# Patient Record
Sex: FEMALE | Race: WHITE | NOT HISPANIC OR LATINO | Employment: UNEMPLOYED | URBAN - METROPOLITAN AREA
[De-identification: names, ages, dates, MRNs, and addresses within clinical notes are randomized per-mention and may not be internally consistent; named-entity substitution may affect disease eponyms.]

---

## 2023-08-28 LAB
ALANINE AMINOTRANSFERASE (SGPT) (U/L) IN SER/PLAS: 12 U/L (ref 7–45)
ALBUMIN (G/DL) IN SER/PLAS: 4 G/DL (ref 3.4–5)
ALKALINE PHOSPHATASE (U/L) IN SER/PLAS: 62 U/L (ref 33–136)
ANION GAP IN SER/PLAS: 11 MMOL/L (ref 10–20)
ASPARTATE AMINOTRANSFERASE (SGOT) (U/L) IN SER/PLAS: 19 U/L (ref 9–39)
BILIRUBIN TOTAL (MG/DL) IN SER/PLAS: 0.6 MG/DL (ref 0–1.2)
CALCIUM (MG/DL) IN SER/PLAS: 9.2 MG/DL (ref 8.6–10.3)
CARBON DIOXIDE, TOTAL (MMOL/L) IN SER/PLAS: 28 MMOL/L (ref 21–32)
CHLORIDE (MMOL/L) IN SER/PLAS: 102 MMOL/L (ref 98–107)
CHOLESTEROL (MG/DL) IN SER/PLAS: 191 MG/DL (ref 0–199)
CHOLESTEROL IN HDL (MG/DL) IN SER/PLAS: 64.3 MG/DL
CHOLESTEROL/HDL RATIO: 3
CREATININE (MG/DL) IN SER/PLAS: 0.62 MG/DL (ref 0.5–1.05)
ERYTHROCYTE DISTRIBUTION WIDTH (RATIO) BY AUTOMATED COUNT: 13.4 % (ref 11.5–14.5)
ERYTHROCYTE MEAN CORPUSCULAR HEMOGLOBIN CONCENTRATION (G/DL) BY AUTOMATED: 33.3 G/DL (ref 32–36)
ERYTHROCYTE MEAN CORPUSCULAR VOLUME (FL) BY AUTOMATED COUNT: 97 FL (ref 80–100)
ERYTHROCYTES (10*6/UL) IN BLOOD BY AUTOMATED COUNT: 4.37 X10E12/L (ref 4–5.2)
GFR FEMALE: >90 ML/MIN/1.73M2
GLUCOSE (MG/DL) IN SER/PLAS: 94 MG/DL (ref 74–99)
HEMATOCRIT (%) IN BLOOD BY AUTOMATED COUNT: 42.6 % (ref 36–46)
HEMOGLOBIN (G/DL) IN BLOOD: 14.2 G/DL (ref 12–16)
LDL: 113 MG/DL (ref 0–99)
LEUKOCYTES (10*3/UL) IN BLOOD BY AUTOMATED COUNT: 5.8 X10E9/L (ref 4.4–11.3)
PLATELETS (10*3/UL) IN BLOOD AUTOMATED COUNT: 241 X10E9/L (ref 150–450)
POTASSIUM (MMOL/L) IN SER/PLAS: 4.1 MMOL/L (ref 3.5–5.3)
PROTEIN TOTAL: 7.2 G/DL (ref 6.4–8.2)
SODIUM (MMOL/L) IN SER/PLAS: 137 MMOL/L (ref 136–145)
TRIGLYCERIDE (MG/DL) IN SER/PLAS: 71 MG/DL (ref 0–149)
UREA NITROGEN (MG/DL) IN SER/PLAS: 9 MG/DL (ref 6–23)
VLDL: 14 MG/DL (ref 0–40)

## 2023-08-29 ENCOUNTER — OFFICE VISIT (OUTPATIENT)
Dept: PRIMARY CARE | Facility: CLINIC | Age: 63
End: 2023-08-29
Payer: COMMERCIAL

## 2023-08-29 VITALS
DIASTOLIC BLOOD PRESSURE: 80 MMHG | OXYGEN SATURATION: 98 % | SYSTOLIC BLOOD PRESSURE: 128 MMHG | HEART RATE: 90 BPM | HEIGHT: 67 IN | WEIGHT: 152.6 LBS | BODY MASS INDEX: 23.95 KG/M2 | RESPIRATION RATE: 16 BRPM | TEMPERATURE: 97.8 F

## 2023-08-29 DIAGNOSIS — Z12.4 SCREENING FOR CERVICAL CANCER: ICD-10-CM

## 2023-08-29 DIAGNOSIS — G43.009 MIGRAINE WITHOUT AURA AND WITHOUT STATUS MIGRAINOSUS, NOT INTRACTABLE: ICD-10-CM

## 2023-08-29 DIAGNOSIS — E78.00 HIGH CHOLESTEROL: Primary | ICD-10-CM

## 2023-08-29 DIAGNOSIS — Z12.31 SCREENING MAMMOGRAM FOR BREAST CANCER: ICD-10-CM

## 2023-08-29 DIAGNOSIS — M25.50 ARTHRALGIA, UNSPECIFIED JOINT: ICD-10-CM

## 2023-08-29 DIAGNOSIS — Z12.11 SCREENING FOR COLON CANCER: ICD-10-CM

## 2023-08-29 DIAGNOSIS — Z00.00 WELL ADULT EXAM: ICD-10-CM

## 2023-08-29 PROBLEM — R53.83 FATIGUE: Status: RESOLVED | Noted: 2023-08-29 | Resolved: 2023-08-29

## 2023-08-29 PROBLEM — R21 RASH AND OTHER NONSPECIFIC SKIN ERUPTION: Status: RESOLVED | Noted: 2022-08-31 | Resolved: 2023-08-29

## 2023-08-29 PROBLEM — M47.816 LUMBAR SPONDYLOSIS: Status: ACTIVE | Noted: 2023-08-29

## 2023-08-29 PROBLEM — L82.1 OTHER SEBORRHEIC KERATOSIS: Status: RESOLVED | Noted: 2022-08-31 | Resolved: 2023-08-29

## 2023-08-29 PROBLEM — D18.01 HEMANGIOMA OF SKIN AND SUBCUTANEOUS TISSUE: Status: RESOLVED | Noted: 2022-08-31 | Resolved: 2023-08-29

## 2023-08-29 PROBLEM — K64.4 HEMORRHOIDAL SKIN TAG: Status: RESOLVED | Noted: 2023-08-29 | Resolved: 2023-08-29

## 2023-08-29 PROBLEM — F41.9 ANXIETY AND DEPRESSION: Status: RESOLVED | Noted: 2023-08-29 | Resolved: 2023-08-29

## 2023-08-29 PROBLEM — D48.5 NEOPLASM OF UNCERTAIN BEHAVIOR OF SKIN: Status: RESOLVED | Noted: 2022-08-31 | Resolved: 2023-08-29

## 2023-08-29 PROBLEM — B07.8 OTHER VIRAL WARTS: Status: RESOLVED | Noted: 2022-08-31 | Resolved: 2023-08-29

## 2023-08-29 PROBLEM — M75.82 TENDINITIS OF LEFT ROTATOR CUFF: Status: RESOLVED | Noted: 2023-08-29 | Resolved: 2023-08-29

## 2023-08-29 PROBLEM — L85.3 XEROSIS CUTIS: Status: RESOLVED | Noted: 2022-08-31 | Resolved: 2023-08-29

## 2023-08-29 PROBLEM — M75.82 TENDINITIS OF LEFT ROTATOR CUFF: Status: ACTIVE | Noted: 2023-08-29

## 2023-08-29 PROBLEM — F32.A ANXIETY AND DEPRESSION: Status: RESOLVED | Noted: 2023-08-29 | Resolved: 2023-08-29

## 2023-08-29 PROBLEM — D22.5 MELANOCYTIC NEVI OF TRUNK: Status: RESOLVED | Noted: 2022-08-31 | Resolved: 2023-08-29

## 2023-08-29 PROBLEM — F32.A ANXIETY AND DEPRESSION: Status: ACTIVE | Noted: 2023-08-29

## 2023-08-29 PROBLEM — L74.513 PRIMARY FOCAL HYPERHIDROSIS, SOLES: Status: RESOLVED | Noted: 2022-08-31 | Resolved: 2023-08-29

## 2023-08-29 PROBLEM — D22.60 MELANOCYTIC NEVI OF UNSPECIFIED UPPER LIMB, INCLUDING SHOULDER: Status: RESOLVED | Noted: 2022-08-31 | Resolved: 2023-08-29

## 2023-08-29 PROBLEM — L21.9 SEBORRHEIC DERMATITIS: Status: RESOLVED | Noted: 2023-08-29 | Resolved: 2023-08-29

## 2023-08-29 PROBLEM — K59.04 CHRONIC IDIOPATHIC CONSTIPATION: Status: ACTIVE | Noted: 2023-08-29

## 2023-08-29 PROBLEM — F41.9 ANXIETY AND DEPRESSION: Status: ACTIVE | Noted: 2023-08-29

## 2023-08-29 PROBLEM — L85.3 DRY SKIN DERMATITIS: Status: RESOLVED | Noted: 2023-08-29 | Resolved: 2023-08-29

## 2023-08-29 PROBLEM — L81.4 OTHER MELANIN HYPERPIGMENTATION: Status: RESOLVED | Noted: 2022-08-31 | Resolved: 2023-08-29

## 2023-08-29 PROBLEM — D22.70 MELANOCYTIC NEVI OF UNSPECIFIED LOWER LIMB, INCLUDING HIP: Status: RESOLVED | Noted: 2022-08-31 | Resolved: 2023-08-29

## 2023-08-29 PROBLEM — R23.3 PETECHIAE: Status: RESOLVED | Noted: 2023-08-29 | Resolved: 2023-08-29

## 2023-08-29 PROBLEM — G47.00 INSOMNIA: Status: ACTIVE | Noted: 2023-08-29

## 2023-08-29 PROBLEM — R92.8 ABNORMAL FINDING ON MAMMOGRAPHY: Status: RESOLVED | Noted: 2023-08-29 | Resolved: 2023-08-29

## 2023-08-29 PROBLEM — L90.5 SCAR CONDITION AND FIBROSIS OF SKIN: Status: RESOLVED | Noted: 2022-08-31 | Resolved: 2023-08-29

## 2023-08-29 PROBLEM — D22.30 MELANOCYTIC NEVI OF UNSPECIFIED PART OF FACE: Status: RESOLVED | Noted: 2022-08-31 | Resolved: 2023-08-29

## 2023-08-29 PROBLEM — D49.2 SKIN NEOPLASM: Status: RESOLVED | Noted: 2023-08-29 | Resolved: 2023-08-29

## 2023-08-29 PROCEDURE — 87624 HPV HI-RISK TYP POOLED RSLT: CPT

## 2023-08-29 PROCEDURE — 88175 CYTOPATH C/V AUTO FLUID REDO: CPT

## 2023-08-29 PROCEDURE — 99396 PREV VISIT EST AGE 40-64: CPT | Performed by: FAMILY MEDICINE

## 2023-08-29 RX ORDER — EZETIMIBE 10 MG/1
10 TABLET ORAL DAILY
COMMUNITY
End: 2024-02-20 | Stop reason: SDUPTHER

## 2023-08-29 RX ORDER — VERAPAMIL HYDROCHLORIDE 180 MG/1
180 CAPSULE, EXTENDED RELEASE ORAL NIGHTLY
Qty: 30 CAPSULE | Refills: 11 | Status: SHIPPED | OUTPATIENT
Start: 2023-08-29 | End: 2023-11-10 | Stop reason: ALTCHOICE

## 2023-08-29 RX ORDER — DICLOFENAC SODIUM 75 MG/1
75 TABLET, DELAYED RELEASE ORAL 2 TIMES DAILY PRN
Qty: 60 TABLET | Refills: 11 | Status: SHIPPED | OUTPATIENT
Start: 2023-08-29 | End: 2024-08-28

## 2023-08-29 RX ORDER — TRAZODONE HYDROCHLORIDE 100 MG/1
200 TABLET ORAL DAILY
COMMUNITY
End: 2024-02-20 | Stop reason: ALTCHOICE

## 2023-08-29 RX ORDER — SUMATRIPTAN SUCCINATE 100 MG/1
50-100 TABLET ORAL ONCE AS NEEDED
Qty: 9 TABLET | Refills: 11 | Status: SHIPPED | OUTPATIENT
Start: 2023-08-29 | End: 2023-09-20

## 2023-08-29 ASSESSMENT — ENCOUNTER SYMPTOMS
CHILLS: 0
LOSS OF SENSATION IN FEET: 0
BLOOD IN STOOL: 0
FEVER: 0
BRUISES/BLEEDS EASILY: 0
ADENOPATHY: 0
ABDOMINAL PAIN: 0
OCCASIONAL FEELINGS OF UNSTEADINESS: 0
DEPRESSION: 1
FATIGUE: 0
ARTHRALGIAS: 1
EYE REDNESS: 0
WOUND: 0
HALLUCINATIONS: 0
RHINORRHEA: 0
BACK PAIN: 0
PALPITATIONS: 0
DYSURIA: 0
SHORTNESS OF BREATH: 0
HEMATURIA: 0
SORE THROAT: 0
EYE PAIN: 0
POLYDIPSIA: 0
HEADACHES: 1
COUGH: 0
WEAKNESS: 0
NECK STIFFNESS: 0

## 2023-08-29 ASSESSMENT — PATIENT HEALTH QUESTIONNAIRE - PHQ9
2. FEELING DOWN, DEPRESSED OR HOPELESS: NOT AT ALL
1. LITTLE INTEREST OR PLEASURE IN DOING THINGS: NOT AT ALL
1. LITTLE INTEREST OR PLEASURE IN DOING THINGS: NOT AT ALL
SUM OF ALL RESPONSES TO PHQ9 QUESTIONS 1 AND 2: 0
SUM OF ALL RESPONSES TO PHQ9 QUESTIONS 1 AND 2: 0
2. FEELING DOWN, DEPRESSED OR HOPELESS: NOT AT ALL

## 2023-08-29 NOTE — ASSESSMENT & PLAN NOTE
Increased frequency.   Encouraged regular sleep, small frequent meals to avoid low sugar.     Start imitrex 100 mg 1/2 -1 to help stop headaches before they get going strong.     Start verapamil 180 mg ER at bedtime.

## 2023-08-29 NOTE — PROGRESS NOTES
Maribel Lakhani is a 63 y.o. female with Chief Complaint of No chief complaint on file..    Past Surgical History:   Procedure Laterality Date    DILATION AND CURETTAGE OF UTERUS  05/05/2014    Dilation And Curettage    FOOT SURGERY  03/11/2014    Foot Surgery    OTHER SURGICAL HISTORY  03/11/2014    Oral Surgery      Social History     Socioeconomic History    Marital status:      Spouse name: Not on file    Number of children: Not on file    Years of education: Not on file    Highest education level: Not on file   Occupational History    Not on file   Tobacco Use    Smoking status: Every Day     Packs/day: .5     Types: Cigarettes    Smokeless tobacco: Never   Substance and Sexual Activity    Alcohol use: Yes     Alcohol/week: 2.0 standard drinks of alcohol     Types: 2 Glasses of wine per week     Comment: not triggering migraines    Drug use: Not on file    Sexual activity: Not on file   Other Topics Concern    Not on file   Social History Narrative    Not on file     Social Determinants of Health     Financial Resource Strain: Not on file   Food Insecurity: Not on file   Transportation Needs: Not on file   Physical Activity: Not on file   Stress: Not on file   Social Connections: Not on file   Intimate Partner Violence: Not on file   Housing Stability: Not on file     Past Medical History:   Diagnosis Date    Abnormal finding on mammography 08/29/2023    Adhesive capsulitis of left shoulder 01/25/2019    Adhesive capsulitis of left shoulder    Adhesive capsulitis of right shoulder 12/03/2015    Adhesive capsulitis of right shoulder    Anxiety and depression 08/29/2023    # Hx Decreased moods c/w major depression and increased anxiety -- settled down.  stable off all SSRIs.    Bitten by cat, initial encounter 05/14/2015    Cat bite    Dry skin dermatitis 08/29/2023    Fatigue 08/29/2023    Hemangioma of skin and subcutaneous tissue 08/31/2022    Hemorrhoidal skin tag 08/29/2023    Melanocytic nevi of  trunk 2022    Melanocytic nevi of unspecified lower limb, including hip 2022    Melanocytic nevi of unspecified part of face 2022    Melanocytic nevi of unspecified upper limb, including shoulder 2022    Neoplasm of uncertain behavior of skin 2022    Other conditions influencing health status     Menstruation    Other melanin hyperpigmentation 2022    Other seborrheic keratosis 2022    Other shoulder lesions, unspecified shoulder 2015    Rotator cuff tendonitis    Other viral warts 2022    Pain in left wrist 2021    Left wrist pain    Pain in right shoulder 2016    Right shoulder pain    Personal history of other complications of pregnancy, childbirth and the puerperium     History of spontaneous     Personal history of other diseases of the circulatory system 2014    History of varicose veins    Personal history of other diseases of the female genital tract     Vaginal delivery    Personal history of other infectious and parasitic diseases     History of varicella    Personal history of other specified conditions 2014    History of urinary urgency    Petechiae 2023    Primary focal hyperhidrosis, soles 2022    Rash and other nonspecific skin eruption 2022    Scar condition and fibrosis of skin 2022    Seborrheic dermatitis 2023    Skin neoplasm 2023    Tendinitis of left rotator cuff 2023    # hx Left Rotator cuff tendinitis -- resolved with HEP and csi 18. Passive rom exercises demonstrated. No longer using tramadol.    Unspecified disorder of nose and nasal sinuses 2014    Sinus problem    Unspecified visual loss 2014    Vision problems    Xerosis cutis 2022      No family history on file.     Review of Systems   Constitutional:  Negative for chills, fatigue and fever.   HENT:  Negative for rhinorrhea and sore throat.    Eyes:  Negative for pain and redness.  "  Respiratory:  Negative for cough and shortness of breath.    Cardiovascular:  Negative for chest pain and palpitations.   Gastrointestinal:  Negative for abdominal pain and blood in stool.   Endocrine: Negative for polydipsia and polyuria.   Genitourinary:  Negative for dysuria and hematuria.   Musculoskeletal:  Positive for arthralgias. Negative for back pain and neck stiffness.   Skin:  Negative for rash and wound.   Allergic/Immunologic: Negative for environmental allergies and food allergies.   Neurological:  Positive for headaches. Negative for weakness.   Hematological:  Negative for adenopathy. Does not bruise/bleed easily.   Psychiatric/Behavioral:  Negative for hallucinations and suicidal ideas.       /80   Pulse 90   Temp 36.6 °C (97.8 °F)   Resp 16   Ht 1.702 m (5' 7\")   Wt 69.2 kg (152 lb 9.6 oz)   SpO2 98%   BMI 23.90 kg/m²   Physical Exam  Vitals reviewed.   Constitutional:       General: She is not in acute distress.     Appearance: She is not ill-appearing.   HENT:      Head: Normocephalic and atraumatic.      Right Ear: Tympanic membrane normal.      Left Ear: Tympanic membrane normal.      Nose: No congestion or rhinorrhea.      Mouth/Throat:      Pharynx: No oropharyngeal exudate or posterior oropharyngeal erythema.   Eyes:      Extraocular Movements: Extraocular movements intact.      Conjunctiva/sclera: Conjunctivae normal.      Pupils: Pupils are equal, round, and reactive to light.   Cardiovascular:      Rate and Rhythm: Normal rate and regular rhythm.      Heart sounds: No murmur heard.     No friction rub. No gallop.   Pulmonary:      Effort: Pulmonary effort is normal.      Breath sounds: Normal breath sounds. No wheezing, rhonchi or rales.   Abdominal:      General: There is no distension.      Palpations: Abdomen is soft.      Tenderness: There is no abdominal tenderness. There is no guarding or rebound.   Musculoskeletal:         General: No swelling or deformity.      " "Cervical back: Normal range of motion and neck supple.      Right lower leg: No edema.      Left lower leg: No edema.   Skin:     Capillary Refill: Capillary refill takes less than 2 seconds.      Coloration: Skin is not jaundiced.      Findings: No rash.   Neurological:      General: No focal deficit present.      Mental Status: She is alert.      Motor: No weakness.   Psychiatric:         Mood and Affect: Mood normal.         Behavior: Behavior normal.     Normal external gentialia  Normal vaginal wall  Normal cervix.  Normal uterus.   Normal adnexa.  Pap test obtained and sent.     Lab Results   Component Value Date    WBC 5.8 08/28/2023    HGB 14.2 08/28/2023    HCT 42.6 08/28/2023    MCV 97 08/28/2023     08/28/2023     Lab Results   Component Value Date    CHOL 191 08/28/2023    CHOL 214 (H) 02/16/2023    CHOL 205 (H) 08/24/2022     Lab Results   Component Value Date    HDL 64.3 08/28/2023    HDL 63.0 02/16/2023    HDL 63.6 08/24/2022     No results found for: \"LDLCALC\"  Lab Results   Component Value Date    TRIG 71 08/28/2023    TRIG 107 02/16/2023    TRIG 73 08/24/2022     No components found for: \"CHOLHDL\"  No results found for: \"HGBA1C\"    Assessment/Plan   Problem List Items Addressed This Visit       High cholesterol - Primary    Overview     # Hyperlipidemia -- [place]'s Risk 4.5% 2019 but LDL >200 -- well controlled on zetia started 2019. No family hx of early heart disease. Continue to monitor.          Current Assessment & Plan     Lipids MUCH improved on zetia -- per labs 8/28/23.          Joint pain    Overview     Right long and small fingers now with PIP nodules.    No better with use of topical voltaren/diclofenac gel otc -- 2022.    Hips/wrists/elbows and knees frequently bothersome.   Stiffness and pain.     #Hx left index finger -- pip nodule -- OA vs ganglion cyst. resolved.          Current Assessment & Plan     Tolerated voltaren/diclofenac for pain in the past.  Never had trouble " with stomach issues.      Restart diclofenac bid with food.  Can use otc famotidine 20 mg daily to protect stomach.          Relevant Medications    diclofenac (Voltaren) 75 mg EC tablet    Well adult exam    Overview     8/29/2023 Preventative Exam -- last Dexa 2022 -- NO osteoporosis. annual mammo -- normal 10/2022. Last pap 2018 next due today 8/29/2023. Colonoscopy 2007 -- normal (refuses further screening). Agrees to SCREENING colonoscopy after 10/7/2023 -- last FIT test (false positive due to hemorrhoidal bleeding from constipation).   Tdap 1/2019. Check labs.     8/29/23 Counseled on smoking cessation -- precontemplative (unable to quit while  still smoking). Chest CT screening is not covered based on her smoking <1 ppd. (<20 total pack years).     # Diffuse dry skin --encourage moisturizing lotion and lac-hydrin (ammonium lactate cream). Sees derm for annual skin exam -- [facility] in [place].   # Hx Right thigh petechiae vs contact dermatitis to elastic in exercise socks -- improved off ASA. normal INR, Sed rate, platelets. No other sign of brusing, purpura, bleeding. No need for aspirin for primary prevention (risks outweigh the benefits).      # Hx Left wrist pain with ulnar neuropathy -- positive tinel's at ulnar aspect of wrist. Encouraged continued wrist splint -- and working to avoid over use. XRay 2/2021 showed no arthritis or fractures -- most likely neuropathy and tendinitis -- can refer to Dr Smalls starts to lose function in hands. Hot wax treatment is worth trying.      # hx of lumbar spondylosis and recent loss of height -- normal DEXA 2020, 2022.     # Seborrhea in bilateral ears -- cortisporin drops as needed. for itch.   # Hemorrhoidal tag -- proctosol as needed for itch.      # hx Right frozen shoulder -- resolved with HEP. offer steroid injection if she plateaus.     # hx Left ankle sprain/swelling -- recommend ice, stretching exercises, alphabet rom.      # For over the counter  medicines -- safe to take allergy medicines like zyrtec/claritin/sudafed as needed. For sour stomach can take maalox, pepto, or prilosec as needed. For cough and cold -- ROBITUSSIN DM is safer than nyquil.          Migraine without aura and without status migrainosus, not intractable    Overview     Left mauricio-cephalgia.  17/month August 2023. (Increased freq since April 2023).   Using generic excedrin migraine relief --  helps to some degree.          Current Assessment & Plan     Increased frequency.   Encouraged regular sleep, small frequent meals to avoid low sugar.     Start imitrex 100 mg 1/2 -1 to help stop headaches before they get going strong.     Start verapamil 180 mg ER at bedtime.          Relevant Medications    verapamil ER (Veralan PM) 180 mg 24 hr capsule    SUMAtriptan (Imitrex) 100 mg tablet

## 2023-08-29 NOTE — ASSESSMENT & PLAN NOTE
Tolerated voltaren/diclofenac for pain in the past.  Never had trouble with stomach issues.      Restart diclofenac bid with food.  Can use otc famotidine 20 mg daily to protect stomach.

## 2023-08-29 NOTE — PATIENT INSTRUCTIONS
OTC colace + senna tablet twice a day for constipation.  Can cut back to 1 a day in case of diarrhea.

## 2023-09-12 LAB
COMPLETE PATHOLOGY REPORT: NORMAL
CONVERTED CLINICAL DIAGNOSIS-HISTORY: NORMAL
CONVERTED DIAGNOSIS COMMENT: NORMAL
CONVERTED FINAL DIAGNOSIS: NORMAL
CONVERTED FINAL REPORT PDF LINK TO COPY AND PASTE: NORMAL

## 2023-09-14 ENCOUNTER — TELEPHONE (OUTPATIENT)
Dept: PRIMARY CARE | Facility: CLINIC | Age: 63
End: 2023-09-14
Payer: COMMERCIAL

## 2023-09-14 NOTE — TELEPHONE ENCOUNTER
Pt called stated that when she was in for her visit that you gave her sumatriptan for her migraines she took a 1/2 tablet went to sleep and had horrible nightmares and woke up was very disoriented and her  had a hard time trying to calm her down.. she doesn't want to continue that med, also she stated a BP med to help prevent the migraine but she has had 7 migraines this week and 5 of them were doubles per day she is asking for a new medication and she isn't going to continue the BP med for the headaches because they aren't working and she doesn't have high blood pressure. Please advised as to what is next.?

## 2023-09-20 ENCOUNTER — TELEPHONE (OUTPATIENT)
Dept: PRIMARY CARE | Facility: CLINIC | Age: 63
End: 2023-09-20
Payer: COMMERCIAL

## 2023-09-20 NOTE — TELEPHONE ENCOUNTER
9/20/23 Addendum to progress note 8/29/23.     Maribel failed to gain relief from trial of 2 triptans -- imitrex (sumatriptan) and maxalt (rizatriptan),    She suffers from 5-7 migraine type headaches a month which last 6-12 hours long.  They are unilateral, pulsating and of severe intensity associated with Nausea and photophobia.     We have ruled out analgesic/medication overuse with no change in migraine frequency.     Nurtec ODT 75 mg has been ordered as an alternative abortive migraine treatment as triptans are not tolerated and ineffective for her migraine headaches.     MP

## 2023-09-22 DIAGNOSIS — G43.009 MIGRAINE WITHOUT AURA AND WITHOUT STATUS MIGRAINOSUS, NOT INTRACTABLE: Primary | ICD-10-CM

## 2023-09-22 DIAGNOSIS — G43.009 MIGRAINE WITHOUT AURA AND WITHOUT STATUS MIGRAINOSUS, NOT INTRACTABLE: ICD-10-CM

## 2023-09-22 RX ORDER — RIZATRIPTAN BENZOATE 10 MG/1
5-10 TABLET ORAL ONCE AS NEEDED
Qty: 9 TABLET | Refills: 0 | Status: SHIPPED | OUTPATIENT
Start: 2023-09-22 | End: 2023-11-10 | Stop reason: ALTCHOICE

## 2023-09-22 RX ORDER — RIZATRIPTAN BENZOATE 10 MG/1
10 TABLET ORAL ONCE
Status: CANCELLED | OUTPATIENT
Start: 2023-09-22 | End: 2023-09-22

## 2023-10-07 ENCOUNTER — HOSPITAL ENCOUNTER (OUTPATIENT)
Dept: RADIOLOGY | Facility: HOSPITAL | Age: 63
Discharge: HOME | End: 2023-10-07
Payer: COMMERCIAL

## 2023-10-07 DIAGNOSIS — Z12.31 SCREENING MAMMOGRAM FOR BREAST CANCER: ICD-10-CM

## 2023-10-07 PROCEDURE — 77067 SCR MAMMO BI INCL CAD: CPT | Mod: BILATERAL PROCEDURE | Performed by: RADIOLOGY

## 2023-10-07 PROCEDURE — 77063 BREAST TOMOSYNTHESIS BI: CPT | Mod: BILATERAL PROCEDURE | Performed by: RADIOLOGY

## 2023-10-07 PROCEDURE — 77067 SCR MAMMO BI INCL CAD: CPT | Mod: 50

## 2023-11-10 ENCOUNTER — OFFICE VISIT (OUTPATIENT)
Dept: PRIMARY CARE | Facility: CLINIC | Age: 63
End: 2023-11-10
Payer: COMMERCIAL

## 2023-11-10 VITALS
TEMPERATURE: 98 F | SYSTOLIC BLOOD PRESSURE: 144 MMHG | DIASTOLIC BLOOD PRESSURE: 78 MMHG | OXYGEN SATURATION: 98 % | RESPIRATION RATE: 16 BRPM | HEART RATE: 102 BPM | BODY MASS INDEX: 23.81 KG/M2 | WEIGHT: 152 LBS

## 2023-11-10 DIAGNOSIS — Z23 NEED FOR VACCINATION: ICD-10-CM

## 2023-11-10 DIAGNOSIS — G43.009 MIGRAINE WITHOUT AURA AND WITHOUT STATUS MIGRAINOSUS, NOT INTRACTABLE: ICD-10-CM

## 2023-11-10 DIAGNOSIS — F32.1 CURRENT MODERATE EPISODE OF MAJOR DEPRESSIVE DISORDER WITHOUT PRIOR EPISODE (MULTI): Primary | ICD-10-CM

## 2023-11-10 DIAGNOSIS — G44.009 CLUSTER HEADACHE, NOT INTRACTABLE, UNSPECIFIED CHRONICITY PATTERN: ICD-10-CM

## 2023-11-10 PROCEDURE — 99214 OFFICE O/P EST MOD 30 MIN: CPT | Performed by: FAMILY MEDICINE

## 2023-11-10 PROCEDURE — 90686 IIV4 VACC NO PRSV 0.5 ML IM: CPT | Performed by: FAMILY MEDICINE

## 2023-11-10 PROCEDURE — 90471 IMMUNIZATION ADMIN: CPT | Performed by: FAMILY MEDICINE

## 2023-11-10 RX ORDER — ESCITALOPRAM OXALATE 10 MG/1
10 TABLET ORAL DAILY
Qty: 30 TABLET | Refills: 5 | Status: SHIPPED | OUTPATIENT
Start: 2023-11-10 | End: 2023-12-06 | Stop reason: SDUPTHER

## 2023-11-10 ASSESSMENT — PATIENT HEALTH QUESTIONNAIRE - PHQ9
10. IF YOU CHECKED OFF ANY PROBLEMS, HOW DIFFICULT HAVE THESE PROBLEMS MADE IT FOR YOU TO DO YOUR WORK, TAKE CARE OF THINGS AT HOME, OR GET ALONG WITH OTHER PEOPLE: VERY DIFFICULT
9. THOUGHTS THAT YOU WOULD BE BETTER OFF DEAD, OR OF HURTING YOURSELF: NOT AT ALL
3. TROUBLE FALLING OR STAYING ASLEEP OR SLEEPING TOO MUCH: SEVERAL DAYS
8. MOVING OR SPEAKING SO SLOWLY THAT OTHER PEOPLE COULD HAVE NOTICED. OR THE OPPOSITE, BEING SO FIGETY OR RESTLESS THAT YOU HAVE BEEN MOVING AROUND A LOT MORE THAN USUAL: NOT AT ALL
2. FEELING DOWN, DEPRESSED OR HOPELESS: NEARLY EVERY DAY
1. LITTLE INTEREST OR PLEASURE IN DOING THINGS: NOT AT ALL
SUM OF ALL RESPONSES TO PHQ9 QUESTIONS 1 AND 2: 3
4. FEELING TIRED OR HAVING LITTLE ENERGY: NEARLY EVERY DAY
5. POOR APPETITE OR OVEREATING: MORE THAN HALF THE DAYS
7. TROUBLE CONCENTRATING ON THINGS, SUCH AS READING THE NEWSPAPER OR WATCHING TELEVISION: NEARLY EVERY DAY
SUM OF ALL RESPONSES TO PHQ QUESTIONS 1-9: 14
6. FEELING BAD ABOUT YOURSELF - OR THAT YOU ARE A FAILURE OR HAVE LET YOURSELF OR YOUR FAMILY DOWN: MORE THAN HALF THE DAYS

## 2023-11-10 ASSESSMENT — ANXIETY QUESTIONNAIRES
1. FEELING NERVOUS, ANXIOUS, OR ON EDGE: MORE THAN HALF THE DAYS
4. TROUBLE RELAXING: MORE THAN HALF THE DAYS
IF YOU CHECKED OFF ANY PROBLEMS ON THIS QUESTIONNAIRE, HOW DIFFICULT HAVE THESE PROBLEMS MADE IT FOR YOU TO DO YOUR WORK, TAKE CARE OF THINGS AT HOME, OR GET ALONG WITH OTHER PEOPLE: EXTREMELY DIFFICULT
3. WORRYING TOO MUCH ABOUT DIFFERENT THINGS: NEARLY EVERY DAY
6. BECOMING EASILY ANNOYED OR IRRITABLE: NEARLY EVERY DAY
2. NOT BEING ABLE TO STOP OR CONTROL WORRYING: NEARLY EVERY DAY
7. FEELING AFRAID AS IF SOMETHING AWFUL MIGHT HAPPEN: NEARLY EVERY DAY
GAD7 TOTAL SCORE: 18
5. BEING SO RESTLESS THAT IT IS HARD TO SIT STILL: MORE THAN HALF THE DAYS

## 2023-11-10 NOTE — PROGRESS NOTES
"Subjective   Patient ID: Maribel Lakhani is a 63 y.o. female who presents for Anxiety.    Here with dtr Mayuri.     Left face swelling without uri or allergic symptoms.  X 1 week.     Migraines continue almost every day -- no better with trial of verapamil ER 8/2023.  Did not tolerate imitrex and did not try maxalt due to acute anxiety.    11/10/23 STEFAN 18/21, PHQ-9 14/27.    Decreased moods, low energy, low motivation.  NO SI/VH/AH.  Appetite unchanged.  Sleep stable with trazodone.     No counseling since her late teens.     Objective   Visit Vitals  /78 (BP Location: Left arm, Patient Position: Sitting, BP Cuff Size: Adult)   Pulse 102   Temp 36.7 °C (98 °F) (Temporal)   Resp 16   Wt 68.9 kg (152 lb)   SpO2 98%   BMI 23.81 kg/m²   Smoking Status Former   BSA 1.8 m²      O: VSS AFEB Awake, Alert, NAD.  Blunted affect. Anxious.    Lab Results   Component Value Date    WBC 5.8 08/28/2023    HGB 14.2 08/28/2023    HCT 42.6 08/28/2023    MCV 97 08/28/2023     08/28/2023       Chemistry    Lab Results   Component Value Date/Time     08/28/2023 0804    K 4.1 08/28/2023 0804     08/28/2023 0804    CO2 28 08/28/2023 0804    BUN 9 08/28/2023 0804    CREATININE 0.62 08/28/2023 0804    Lab Results   Component Value Date/Time    CALCIUM 9.2 08/28/2023 0804    ALKPHOS 62 08/28/2023 0804    AST 19 08/28/2023 0804    ALT 12 08/28/2023 0804    BILITOT 0.6 08/28/2023 0804          Lab Results   Component Value Date    CHOL 191 08/28/2023    CHOL 214 (H) 02/16/2023    CHOL 205 (H) 08/24/2022     Lab Results   Component Value Date    HDL 64.3 08/28/2023    HDL 63.0 02/16/2023    HDL 63.6 08/24/2022     No results found for: \"LDLCALC\"  Lab Results   Component Value Date    TRIG 71 08/28/2023    TRIG 107 02/16/2023    TRIG 73 08/24/2022     No components found for: \"CHOLHDL\"   No results found for: \"HGBA1C\"    Assessment/Plan   Problem List Items Addressed This Visit       Migraine without aura and without status " migrainosus, not intractable    Overview     Left mauricio-cephalgia.  17/month August 2023. (Increased freq since April 2023).   Using generic excedrin migraine relief --  helps to some degree.          Current Assessment & Plan     Increased frequency.   Encouraged regular sleep, small frequent meals to avoid low sugar.     Imitrex not tolerated due to side effects/anxiety.  No improvement with trial of verapamil.     Trial Nurtec. Refer to Dr Salter.          Current moderate episode of major depressive disorder without prior episode (CMS/HCC) - Primary    Overview     Add lexapro 10 mg nightly.   Reluctant to stop trazodone now.     After 2 weeks if tolerating lexapro will cut back to trazodone 100 mg at bedtime and increase lexapro to 20.,    After 2 more weeks if sleeping ok can try stopping trazodone entirely.           Other Visit Diagnoses       Cluster headache, not intractable, unspecified chronicity pattern

## 2023-11-10 NOTE — ASSESSMENT & PLAN NOTE
Increased frequency.   Encouraged regular sleep, small frequent meals to avoid low sugar.     Imitrex not tolerated due to side effects/anxiety.  No improvement with trial of verapamil.     Trial Nurtec. Refer to Dr Salter.

## 2023-11-10 NOTE — PATIENT INSTRUCTIONS
After 2 weeks if tolerating lexapro will cut back to trazodone 100 mg at bedtime and increase lexapro to 20.    After 2 more weeks if sleeping ok can try stopping trazodone entirely.

## 2023-11-11 ENCOUNTER — LAB (OUTPATIENT)
Dept: LAB | Facility: LAB | Age: 63
End: 2023-11-11
Payer: COMMERCIAL

## 2023-11-11 DIAGNOSIS — G44.009 CLUSTER HEADACHE, NOT INTRACTABLE, UNSPECIFIED CHRONICITY PATTERN: ICD-10-CM

## 2023-11-11 LAB
ALBUMIN SERPL BCP-MCNC: 4.2 G/DL (ref 3.4–5)
ALP SERPL-CCNC: 59 U/L (ref 33–136)
ALT SERPL W P-5'-P-CCNC: 11 U/L (ref 7–45)
ANION GAP SERPL CALC-SCNC: 12 MMOL/L (ref 10–20)
AST SERPL W P-5'-P-CCNC: 14 U/L (ref 9–39)
BILIRUB SERPL-MCNC: 0.6 MG/DL (ref 0–1.2)
BUN SERPL-MCNC: 9 MG/DL (ref 6–23)
CALCIUM SERPL-MCNC: 9.3 MG/DL (ref 8.6–10.3)
CHLORIDE SERPL-SCNC: 103 MMOL/L (ref 98–107)
CO2 SERPL-SCNC: 26 MMOL/L (ref 21–32)
CREAT SERPL-MCNC: 0.6 MG/DL (ref 0.5–1.05)
CRP SERPL-MCNC: 3.69 MG/DL
ERYTHROCYTE [SEDIMENTATION RATE] IN BLOOD BY WESTERGREN METHOD: 48 MM/H (ref 0–30)
GFR SERPL CREATININE-BSD FRML MDRD: >90 ML/MIN/1.73M*2
GLUCOSE SERPL-MCNC: 96 MG/DL (ref 74–99)
POTASSIUM SERPL-SCNC: 4.1 MMOL/L (ref 3.5–5.3)
PROT SERPL-MCNC: 7 G/DL (ref 6.4–8.2)
SODIUM SERPL-SCNC: 137 MMOL/L (ref 136–145)

## 2023-11-11 PROCEDURE — 36415 COLL VENOUS BLD VENIPUNCTURE: CPT

## 2023-11-14 ENCOUNTER — TELEPHONE (OUTPATIENT)
Dept: PRIMARY CARE | Facility: CLINIC | Age: 63
End: 2023-11-14
Payer: COMMERCIAL

## 2023-11-14 DIAGNOSIS — G43.009 MIGRAINE WITHOUT AURA AND WITHOUT STATUS MIGRAINOSUS, NOT INTRACTABLE: Primary | ICD-10-CM

## 2023-11-14 RX ORDER — PREDNISONE 10 MG/1
TABLET ORAL
Qty: 40 TABLET | Refills: 0 | Status: SHIPPED | OUTPATIENT
Start: 2023-11-14 | End: 2023-12-06 | Stop reason: ALTCHOICE

## 2023-11-14 NOTE — TELEPHONE ENCOUNTER
Per Dr Berry referral to either Dr Salter or Dr Ricardo  spoke with the patient she stated she is going to call and schedule appt. But since started the lexapro and the nurtec she has had nothing but a migraine everyday. And now she is hesitant to start the prednisone taper. Should she stop both the nurtec and the lexapro, or continue them both?

## 2023-11-14 NOTE — TELEPHONE ENCOUNTER
Patient states that her left cheek is still swollen. Her headaches have gotten worse since being seen on 11/10. She states that ice has help with the pain a little bit but the OTC medication she takes has not been helping as much anymore. Patient wants to know if she should see the neurologist? What is the next step.

## 2023-11-16 NOTE — TELEPHONE ENCOUNTER
Spoke with the patient she stated that she stopped the lexapro and is feeling better , and she doesn't know if she will start the prdnisone.

## 2023-12-04 DIAGNOSIS — F32.1 CURRENT MODERATE EPISODE OF MAJOR DEPRESSIVE DISORDER WITHOUT PRIOR EPISODE (MULTI): ICD-10-CM

## 2023-12-04 NOTE — TELEPHONE ENCOUNTER
Pt called stated that she has finished her prednisone for her swollen face and she feels that is has gotten worse instead of better, she would like to know what to do ?

## 2023-12-06 ENCOUNTER — TELEPHONE (OUTPATIENT)
Dept: PRIMARY CARE | Facility: CLINIC | Age: 63
End: 2023-12-06
Payer: COMMERCIAL

## 2023-12-06 DIAGNOSIS — F32.1 CURRENT MODERATE EPISODE OF MAJOR DEPRESSIVE DISORDER WITHOUT PRIOR EPISODE (MULTI): ICD-10-CM

## 2023-12-06 DIAGNOSIS — R22.0 SWELLING OF FACE: Primary | ICD-10-CM

## 2023-12-06 RX ORDER — HYDROXYZINE HYDROCHLORIDE 25 MG/1
12.5-25 TABLET, FILM COATED ORAL EVERY 6 HOURS PRN
Qty: 30 TABLET | Refills: 0 | Status: SHIPPED | OUTPATIENT
Start: 2023-12-06 | End: 2024-02-20

## 2023-12-06 RX ORDER — ESCITALOPRAM OXALATE 10 MG/1
20 TABLET ORAL DAILY
Qty: 60 TABLET | Refills: 11 | Status: SHIPPED | OUTPATIENT
Start: 2023-12-06 | End: 2023-12-06 | Stop reason: DRUGHIGH

## 2023-12-06 RX ORDER — ESCITALOPRAM OXALATE 20 MG/1
20 TABLET ORAL NIGHTLY
Qty: 30 TABLET | Refills: 5 | Status: SHIPPED | OUTPATIENT
Start: 2023-12-06 | End: 2024-05-23 | Stop reason: ALTCHOICE

## 2023-12-06 NOTE — TELEPHONE ENCOUNTER
Pharm called stated that th\e pt insurance wont pay for lexapro 10mg  2 po daily but will pay for 20mg 1 po daily. Plese resubmit  rx if this is ok .

## 2023-12-06 NOTE — TELEPHONE ENCOUNTER
Spoke with Maribel-only symptom is the face swelling-no migraines, no itching or trouble swallowing just the swelling-kind of panicky and wants to know what to do next.

## 2023-12-11 ENCOUNTER — APPOINTMENT (OUTPATIENT)
Dept: NEUROLOGY | Facility: CLINIC | Age: 63
End: 2023-12-11
Payer: COMMERCIAL

## 2023-12-14 ENCOUNTER — TELEPHONE (OUTPATIENT)
Dept: PRIMARY CARE | Facility: CLINIC | Age: 63
End: 2023-12-14
Payer: COMMERCIAL

## 2023-12-14 NOTE — TELEPHONE ENCOUNTER
Patient called stated that she finished her meds and her face is still puffy she would like to know what she can do now seeing that nothing is working?

## 2023-12-18 DIAGNOSIS — R22.0 SWOLLEN FACE: ICD-10-CM

## 2023-12-27 ENCOUNTER — APPOINTMENT (OUTPATIENT)
Dept: NEUROLOGY | Facility: CLINIC | Age: 63
End: 2023-12-27
Payer: COMMERCIAL

## 2024-02-20 ENCOUNTER — OFFICE VISIT (OUTPATIENT)
Dept: PRIMARY CARE | Facility: CLINIC | Age: 64
End: 2024-02-20
Payer: COMMERCIAL

## 2024-02-20 ENCOUNTER — LAB (OUTPATIENT)
Dept: LAB | Facility: LAB | Age: 64
End: 2024-02-20
Payer: COMMERCIAL

## 2024-02-20 VITALS
TEMPERATURE: 97.6 F | RESPIRATION RATE: 16 BRPM | HEART RATE: 76 BPM | DIASTOLIC BLOOD PRESSURE: 68 MMHG | SYSTOLIC BLOOD PRESSURE: 130 MMHG | WEIGHT: 156 LBS | BODY MASS INDEX: 24.43 KG/M2 | OXYGEN SATURATION: 98 %

## 2024-02-20 DIAGNOSIS — M25.541 ARTHRALGIA OF BOTH HANDS: ICD-10-CM

## 2024-02-20 DIAGNOSIS — G43.009 MIGRAINE WITHOUT AURA AND WITHOUT STATUS MIGRAINOSUS, NOT INTRACTABLE: ICD-10-CM

## 2024-02-20 DIAGNOSIS — Z12.11 COLON CANCER SCREENING: Primary | ICD-10-CM

## 2024-02-20 DIAGNOSIS — E78.00 HIGH CHOLESTEROL: ICD-10-CM

## 2024-02-20 DIAGNOSIS — M25.542 ARTHRALGIA OF BOTH HANDS: ICD-10-CM

## 2024-02-20 DIAGNOSIS — K59.04 CHRONIC IDIOPATHIC CONSTIPATION: ICD-10-CM

## 2024-02-20 DIAGNOSIS — R22.0 SWELLING OF LEFT SIDE OF FACE: ICD-10-CM

## 2024-02-20 DIAGNOSIS — F51.01 PRIMARY INSOMNIA: ICD-10-CM

## 2024-02-20 DIAGNOSIS — F32.1 CURRENT MODERATE EPISODE OF MAJOR DEPRESSIVE DISORDER WITHOUT PRIOR EPISODE (MULTI): ICD-10-CM

## 2024-02-20 LAB
ALBUMIN SERPL BCP-MCNC: 4.3 G/DL (ref 3.4–5)
ALP SERPL-CCNC: 69 U/L (ref 33–136)
ALT SERPL W P-5'-P-CCNC: 12 U/L (ref 7–45)
ANION GAP SERPL CALC-SCNC: 13 MMOL/L (ref 10–20)
AST SERPL W P-5'-P-CCNC: 17 U/L (ref 9–39)
BILIRUB SERPL-MCNC: 0.5 MG/DL (ref 0–1.2)
BUN SERPL-MCNC: 9 MG/DL (ref 6–23)
CALCIUM SERPL-MCNC: 9.4 MG/DL (ref 8.6–10.3)
CHLORIDE SERPL-SCNC: 103 MMOL/L (ref 98–107)
CO2 SERPL-SCNC: 28 MMOL/L (ref 21–32)
CREAT SERPL-MCNC: 0.58 MG/DL (ref 0.5–1.05)
CRP SERPL-MCNC: 2.5 MG/DL
EGFRCR SERPLBLD CKD-EPI 2021: >90 ML/MIN/1.73M*2
ERYTHROCYTE [DISTWIDTH] IN BLOOD BY AUTOMATED COUNT: 14.9 % (ref 11.5–14.5)
ERYTHROCYTE [SEDIMENTATION RATE] IN BLOOD BY WESTERGREN METHOD: 25 MM/H (ref 0–30)
GLUCOSE SERPL-MCNC: 68 MG/DL (ref 74–99)
HCT VFR BLD AUTO: 45.1 % (ref 36–46)
HGB BLD-MCNC: 14.5 G/DL (ref 12–16)
MCH RBC QN AUTO: 31.6 PG (ref 26–34)
MCHC RBC AUTO-ENTMCNC: 32.2 G/DL (ref 32–36)
MCV RBC AUTO: 98 FL (ref 80–100)
NRBC BLD-RTO: 0 /100 WBCS (ref 0–0)
PLATELET # BLD AUTO: 239 X10*3/UL (ref 150–450)
POTASSIUM SERPL-SCNC: 4.4 MMOL/L (ref 3.5–5.3)
PROT SERPL-MCNC: 7.5 G/DL (ref 6.4–8.2)
RBC # BLD AUTO: 4.59 X10*6/UL (ref 4–5.2)
SODIUM SERPL-SCNC: 140 MMOL/L (ref 136–145)
WBC # BLD AUTO: 8 X10*3/UL (ref 4.4–11.3)

## 2024-02-20 PROCEDURE — 80053 COMPREHEN METABOLIC PANEL: CPT

## 2024-02-20 PROCEDURE — 85027 COMPLETE CBC AUTOMATED: CPT

## 2024-02-20 PROCEDURE — 86225 DNA ANTIBODY NATIVE: CPT

## 2024-02-20 PROCEDURE — 86038 ANTINUCLEAR ANTIBODIES: CPT

## 2024-02-20 PROCEDURE — 36415 COLL VENOUS BLD VENIPUNCTURE: CPT

## 2024-02-20 PROCEDURE — 85652 RBC SED RATE AUTOMATED: CPT

## 2024-02-20 PROCEDURE — 86140 C-REACTIVE PROTEIN: CPT

## 2024-02-20 PROCEDURE — 99214 OFFICE O/P EST MOD 30 MIN: CPT | Performed by: FAMILY MEDICINE

## 2024-02-20 PROCEDURE — 1036F TOBACCO NON-USER: CPT | Performed by: FAMILY MEDICINE

## 2024-02-20 PROCEDURE — 86235 NUCLEAR ANTIGEN ANTIBODY: CPT

## 2024-02-20 RX ORDER — DULOXETIN HYDROCHLORIDE 60 MG/1
60 CAPSULE, DELAYED RELEASE ORAL DAILY
Qty: 30 CAPSULE | Refills: 11 | Status: SHIPPED | OUTPATIENT
Start: 2024-03-19 | End: 2024-05-01

## 2024-02-20 RX ORDER — DULOXETIN HYDROCHLORIDE 60 MG/1
60 CAPSULE, DELAYED RELEASE ORAL DAILY
Qty: 30 CAPSULE | Refills: 5 | Status: SHIPPED | OUTPATIENT
Start: 2024-02-20 | End: 2024-02-20 | Stop reason: ALTCHOICE

## 2024-02-20 RX ORDER — DULOXETIN HYDROCHLORIDE 30 MG/1
CAPSULE, DELAYED RELEASE ORAL
Qty: 60 CAPSULE | Refills: 5 | Status: SHIPPED | OUTPATIENT
Start: 2024-02-20 | End: 2024-05-01 | Stop reason: SDUPTHER

## 2024-02-20 RX ORDER — EZETIMIBE 10 MG/1
10 TABLET ORAL DAILY
Qty: 90 TABLET | Refills: 3 | Status: SHIPPED | OUTPATIENT
Start: 2024-02-20

## 2024-02-20 RX ORDER — TRAZODONE HYDROCHLORIDE 100 MG/1
100 TABLET ORAL DAILY
Qty: 90 TABLET | Refills: 3 | Status: SHIPPED | OUTPATIENT
Start: 2024-02-20 | End: 2025-02-19

## 2024-02-20 NOTE — ASSESSMENT & PLAN NOTE
Lipids MUCH improved on zetia -- per labs 8/28/23.     [place]'s Risk 4.5% 2019 but LDL >200 -- well controlled on zetia started 2019. No family hx of early heart disease. Continue to monitor.

## 2024-02-20 NOTE — ASSESSMENT & PLAN NOTE
# Constipation -- prune juice keeps her regular. AVOID TUMS which make constipation worse.    Try use of over the counter senna+colace twice a day.  If too strong can cut back to once a day.

## 2024-02-20 NOTE — ASSESSMENT & PLAN NOTE
Left mauricio-cephalgia.  Start and stop in clusters.     17/month August 2023. (Increased freq since April 2023).   Using generic excedrin migraine relief --  helps to some degree.     11/2023 elevated ESR/CRP -- no immediate response to pred burst and taper.  No further migraines since 11/19/23 -- last migraine.      Imitrex not tolerated due to side effects/anxiety.  No improvement with trial of verapamil.     No better with Nurtec. Refer to Jose Martin -- was unable to schedule appt.

## 2024-02-20 NOTE — ASSESSMENT & PLAN NOTE
Tolerated voltaren/diclofenac for pain in the past.  Never had trouble with stomach issues.      Restart diclofenac bid with food.  Can use otc famotidine 20 mg daily to protect stomach.     Right long and small fingers now with PIP nodules.    No better with use of topical voltaren/diclofenac gel otc -- 2022.  Hips/wrists/elbows and knees frequently bothersome.   Stiffness and pain.     #Hx left index finger -- pip nodule -- OA vs ganglion cyst. resolved.

## 2024-02-20 NOTE — ASSESSMENT & PLAN NOTE
Family hx SLE/Sjogren's but no Ssc.   Will check labs.   If no source can check CT sinuses and consider ENT consult.

## 2024-02-20 NOTE — PROGRESS NOTES
"Subjective   Patient ID: Maribel Lakhani is a 63 y.o. female who presents for Follow-up (6 month ).    Recheck on chronic stable migraine, insomnia, and HLD.     Left face swelling and tightness started 11/3/24 -- no improvement  with hydroxyzine. -- prior to Xmas was taken to urgent care and diagnosed with sinusitis and treated with doxycycline which did not help either.     Migraines continue almost every day -- no better with trial of verapamil ER 8/2023.  Did not tolerate imitrex and did not try maxalt due to acute anxiety.    11/10/23 STEFAN 18/21, PHQ-9 14/27.    Decreased moods, low energy, low motivation.  NO SI/VH/AH.  Appetite unchanged.  Sleep stable with trazodone.     No counseling since her late teens.     Objective   Visit Vitals  /68 (BP Location: Left arm, Patient Position: Sitting, BP Cuff Size: Adult)   Pulse 76   Temp 36.4 °C (97.6 °F)   Resp 16   Wt 70.8 kg (156 lb)   SpO2 98%   BMI 24.43 kg/m²   Smoking Status Former   BSA 1.83 m²      O: VSS AFEB Awake, Alert, NAD.  Blunted affect. Anxious.  Left face swelling.  Non tender.     Lab Results   Component Value Date    WBC 5.8 08/28/2023    HGB 14.2 08/28/2023    HCT 42.6 08/28/2023    MCV 97 08/28/2023     08/28/2023       Chemistry    Lab Results   Component Value Date/Time     11/11/2023 0835    K 4.1 11/11/2023 0835     11/11/2023 0835    CO2 26 11/11/2023 0835    BUN 9 11/11/2023 0835    CREATININE 0.60 11/11/2023 0835    Lab Results   Component Value Date/Time    CALCIUM 9.3 11/11/2023 0835    ALKPHOS 59 11/11/2023 0835    AST 14 11/11/2023 0835    ALT 11 11/11/2023 0835    BILITOT 0.6 11/11/2023 0835          Lab Results   Component Value Date    CHOL 191 08/28/2023    CHOL 214 (H) 02/16/2023    CHOL 205 (H) 08/24/2022     Lab Results   Component Value Date    HDL 64.3 08/28/2023    HDL 63.0 02/16/2023    HDL 63.6 08/24/2022     No results found for: \"LDLCALC\"  Lab Results   Component Value Date    TRIG 71 08/28/2023    " "TRIG 107 02/16/2023    TRIG 73 08/24/2022     No components found for: \"CHOLHDL\"   No results found for: \"HGBA1C\"    Assessment/Plan   Problem List Items Addressed This Visit       High cholesterol    Current Assessment & Plan     Lipids MUCH improved on zetia -- per labs 8/28/23.     [place]'s Risk 4.5% 2019 but LDL >200 -- well controlled on zetia started 2019. No family hx of early heart disease. Continue to monitor.          Relevant Medications    ezetimibe (Zetia) 10 mg tablet    Insomnia    Overview     Failed melatonin and ambien 2018.   Weaned off trazodone 2023            Current Assessment & Plan     Improved with lexapro 20 mg.  OFF trazodone.          Joint pain    Current Assessment & Plan     Tolerated voltaren/diclofenac for pain in the past.  Never had trouble with stomach issues.      Restart diclofenac bid with food.  Can use otc famotidine 20 mg daily to protect stomach.     Right long and small fingers now with PIP nodules.    No better with use of topical voltaren/diclofenac gel otc -- 2022.  Hips/wrists/elbows and knees frequently bothersome.   Stiffness and pain.     #Hx left index finger -- pip nodule -- OA vs ganglion cyst. resolved.          Chronic idiopathic constipation    Current Assessment & Plan     # Constipation -- prune juice keeps her regular. AVOID TUMS which make constipation worse.    Try use of over the counter senna+colace twice a day.  If too strong can cut back to once a day.          Migraine without aura and without status migrainosus, not intractable    Current Assessment & Plan     Left mauricio-cephalgia.  Start and stop in clusters.     17/month August 2023. (Increased freq since April 2023).   Using generic excedrin migraine relief --  helps to some degree.     11/2023 elevated ESR/CRP -- no immediate response to pred burst and taper.  No further migraines since 11/19/23 -- last migraine.      Imitrex not tolerated due to side effects/anxiety.  No improvement with trial " of verapamil.     No better with Nurtec. Refer to Jose Martin -- was unable to schedule appt.          Current moderate episode of major depressive disorder without prior episode (CMS/HCC)    Current Assessment & Plan     Moods and hot flashes no better with lexapro 20 mg daily/and trazodone 100.    Able to sleep through the night.   Slower sleep induction.     Trial wean off lexapro and try cymbalta 30 mg x 1 week then 60 mg daily.          Relevant Medications    DULoxetine (Cymbalta) 30 mg DR capsule    DULoxetine (Cymbalta) 60 mg DR capsule (Start on 3/19/2024)    traZODone (Desyrel) 100 mg tablet    Swelling of left side of face    Overview     Onset 11/3/2023  No improvement with hydroxyzine or abx (doxycycline).    Seeing Allergist Mushtaq 3/13/24.           Current Assessment & Plan     Family hx SLE/Sjogren's but no Ssc.   Will check labs.   If no source can check CT sinuses and consider ENT consult.          Relevant Orders    Sedimentation Rate    C-reactive protein    CBC    Comprehensive metabolic panel    KALEB with Reflex to SERGIO     Other Visit Diagnoses       Colon cancer screening    -  Primary    Relevant Orders    Colonoscopy Screening; Average Risk Patient

## 2024-02-20 NOTE — ASSESSMENT & PLAN NOTE
Moods and hot flashes no better with lexapro 20 mg daily/and trazodone 100.    Able to sleep through the night.   Slower sleep induction.     Trial wean off lexapro and try cymbalta 30 mg x 1 week then 60 mg daily.

## 2024-02-21 LAB
ANA PATTERN: ABNORMAL
ANA SER QL HEP2 SUBST: POSITIVE
ANA TITR SER IF: ABNORMAL {TITER}
CENTROMERE B AB SER-ACNC: <0.2 AI
CHROMATIN AB SERPL-ACNC: <0.2 AI
DSDNA AB SER-ACNC: 1 IU/ML
ENA JO1 AB SER QL IA: <0.2 AI
ENA RNP AB SER IA-ACNC: <0.2 AI
ENA SCL70 AB SER QL IA: 0.2 AI
ENA SM AB SER IA-ACNC: <0.2 AI
ENA SM+RNP AB SER QL IA: <0.2 AI
ENA SS-A AB SER IA-ACNC: <0.2 AI
ENA SS-B AB SER IA-ACNC: <0.2 AI
RIBOSOMAL P AB SER-ACNC: <0.2 AI

## 2024-02-26 ENCOUNTER — TELEPHONE (OUTPATIENT)
Dept: PRIMARY CARE | Facility: CLINIC | Age: 64
End: 2024-02-26
Payer: COMMERCIAL

## 2024-02-26 DIAGNOSIS — Z12.11 SCREEN FOR COLON CANCER: ICD-10-CM

## 2024-02-26 RX ORDER — SOD SULF/POT CHLORIDE/MAG SULF 1.479 G
TABLET ORAL
Qty: 24 TABLET | Refills: 0 | Status: SHIPPED | OUTPATIENT
Start: 2024-02-26

## 2024-02-26 NOTE — TELEPHONE ENCOUNTER
Pt called requesting a refill for Cymbalta prescription for 60mg before trip on 02/08/24. Requested a call to confirm it was sent. I informed pt there was a script sent that would be ready for 3/19/24 but state there was some confusion and she needs it before.

## 2024-02-27 NOTE — TELEPHONE ENCOUNTER
Spoke with patient, confused about her prescriptions, went over what was in her office note with her and straightened out her confusion.   L/m with pharmacy that early fill for cymbalta 60mg was ok due to patient leaving out of town.

## 2024-02-27 NOTE — TELEPHONE ENCOUNTER
Called in again today, stating she's leaving to take care of her father need the prescription in. Also has questions about the directions of another prescription.

## 2024-02-28 ENCOUNTER — PHARMACY VISIT (OUTPATIENT)
Dept: PHARMACY | Facility: CLINIC | Age: 64
End: 2024-02-28
Payer: COMMERCIAL

## 2024-02-28 PROCEDURE — RXMED WILLOW AMBULATORY MEDICATION CHARGE

## 2024-03-13 ENCOUNTER — CONSULT (OUTPATIENT)
Dept: ALLERGY | Facility: CLINIC | Age: 64
End: 2024-03-13
Payer: COMMERCIAL

## 2024-03-13 VITALS
HEART RATE: 89 BPM | BODY MASS INDEX: 23.73 KG/M2 | DIASTOLIC BLOOD PRESSURE: 71 MMHG | SYSTOLIC BLOOD PRESSURE: 121 MMHG | WEIGHT: 156.6 LBS | HEIGHT: 68 IN

## 2024-03-13 DIAGNOSIS — T78.3XXA ANGIOEDEMA, INITIAL ENCOUNTER: Primary | ICD-10-CM

## 2024-03-13 PROCEDURE — 99204 OFFICE O/P NEW MOD 45 MIN: CPT | Performed by: ALLERGY & IMMUNOLOGY

## 2024-03-13 NOTE — LETTER
Thank you very much for sending your patient for evaluation. If you have any questions or other concerns please let me know.     Best regards, Dustin

## 2024-03-13 NOTE — PROGRESS NOTES
"Subjective   Patient ID:   [id number]   Maribel Lakhani is a 63 y.o. female who presents for Allergies (LEFT CHEEK SWELLING SINCE NOV 3, 2023).    Chief Complaint   Patient presents with    Allergies     LEFT CHEEK SWELLING SINCE NOV 3, 2023          HPI  This patient is here to evaluate for:    Woke up and her cheek was swollen.   It has felt like a pain, and tightness.   No itching.   The swelling has persisted and has not resolved at any time since November '23.    She goes to the dentist who has not seen this.     She has gone to Urgent care and the PA thought it was an infection.   It did seem to get   Recommended an ENT doctor.     No improvement with hydroxyzine or abx (doxycycline)     Any changes in medication, diet, soap, detergents, fabric softener, or personal products:  No          Review of Systems   All other systems reviewed and are negative.        Objective     /71   Pulse 89   Ht 1.727 m (5' 8\")   Wt 71 kg (156 lb 9.6 oz)   BMI 23.81 kg/m²      Physical Exam  Constitutional:       General: She is not in acute distress.     Appearance: Normal appearance. She is not ill-appearing.   HENT:      Head: Normocephalic and atraumatic.      Right Ear: Tympanic membrane, ear canal and external ear normal.      Left Ear: Tympanic membrane, ear canal and external ear normal.      Nose: Nose normal. No congestion or rhinorrhea.      Mouth/Throat:      Mouth: Mucous membranes are moist.      Pharynx: Oropharynx is clear. No oropharyngeal exudate or posterior oropharyngeal erythema.      Comments: There is swelling of the left cheek, but no tenderness on palpation.  Mildly thickened compared to the left side as well.  Eyes:      General:         Right eye: No discharge.         Left eye: No discharge.      Conjunctiva/sclera: Conjunctivae normal.   Cardiovascular:      Rate and Rhythm: Normal rate and regular rhythm.      Heart sounds: Normal heart sounds. No murmur heard.     No friction rub. No gallop. "   Pulmonary:      Effort: Pulmonary effort is normal. No respiratory distress.      Breath sounds: Normal breath sounds. No stridor. No wheezing, rhonchi or rales.   Chest:      Chest wall: No tenderness.   Abdominal:      General: Abdomen is flat.      Palpations: Abdomen is soft.   Musculoskeletal:         General: Normal range of motion.      Cervical back: Normal range of motion and neck supple.   Lymphadenopathy:      Cervical: No cervical adenopathy.   Skin:     General: Skin is warm and dry.      Findings: No erythema, lesion or rash.   Neurological:      General: No focal deficit present.      Mental Status: She is alert. Mental status is at baseline.   Psychiatric:         Mood and Affect: Mood normal.         Behavior: Behavior normal.         Thought Content: Thought content normal.         Judgment: Judgment normal.            Current Outpatient Medications   Medication Sig Dispense Refill    diclofenac (Voltaren) 75 mg EC tablet Take 1 tablet (75 mg) by mouth 2 times a day as needed (pain). Do not crush, chew, or split. 60 tablet 11    DULoxetine (Cymbalta) 30 mg DR capsule 1 po daily x 1 week then 2 po daily x 3 weeks. 60 capsule 5    [START ON 3/19/2024] DULoxetine (Cymbalta) 60 mg DR capsule Take 1 capsule (60 mg) by mouth once daily. Do not crush or chew. Do not start before March 19, 2024. 30 capsule 11    escitalopram (Lexapro) 20 mg tablet Take 1 tablet (20 mg) by mouth once daily at bedtime. 30 tablet 5    ezetimibe (Zetia) 10 mg tablet Take 1 tablet (10 mg) by mouth once daily. 90 tablet 3    mv-mn/folic ac/calcium/vit K1 (WOMEN'S 50 PLUS MULTIVITAMIN ORAL) Take 1 tablet by mouth once daily.      Sutab 1.479-0.188- 0.225 gram tablet One kit   At 6pm  the night before take 12 pills in 20 min. And at 3.am the day of procedure take 12 pills in 20 min. 24 tablet 0    traZODone (Desyrel) 100 mg tablet Take 1 tablet (100 mg) by mouth once daily. 90 tablet 3     No current facility-administered  medications for this visit.       Summary of the labs over the past 6 months:    Lab on 02/20/2024   Component Date Value Ref Range Status    Sedimentation Rate 02/20/2024 25  0 - 30 mm/h Final    C-Reactive Protein 02/20/2024 2.50 (H)  <1.00 mg/dL Final    WBC 02/20/2024 8.0  4.4 - 11.3 x10*3/uL Final    nRBC 02/20/2024 0.0  0.0 - 0.0 /100 WBCs Final    RBC 02/20/2024 4.59  4.00 - 5.20 x10*6/uL Final    Hemoglobin 02/20/2024 14.5  12.0 - 16.0 g/dL Final    Hematocrit 02/20/2024 45.1  36.0 - 46.0 % Final    MCV 02/20/2024 98  80 - 100 fL Final    MCH 02/20/2024 31.6  26.0 - 34.0 pg Final    MCHC 02/20/2024 32.2  32.0 - 36.0 g/dL Final    RDW 02/20/2024 14.9 (H)  11.5 - 14.5 % Final    Platelets 02/20/2024 239  150 - 450 x10*3/uL Final    Glucose 02/20/2024 68 (L)  74 - 99 mg/dL Final    Sodium 02/20/2024 140  136 - 145 mmol/L Final    Potassium 02/20/2024 4.4  3.5 - 5.3 mmol/L Final    Chloride 02/20/2024 103  98 - 107 mmol/L Final    Bicarbonate 02/20/2024 28  21 - 32 mmol/L Final    Anion Gap 02/20/2024 13  10 - 20 mmol/L Final    Urea Nitrogen 02/20/2024 9  6 - 23 mg/dL Final    Creatinine 02/20/2024 0.58  0.50 - 1.05 mg/dL Final    eGFR 02/20/2024 >90  >60 mL/min/1.73m*2 Final    Calcium 02/20/2024 9.4  8.6 - 10.3 mg/dL Final    Albumin 02/20/2024 4.3  3.4 - 5.0 g/dL Final    Alkaline Phosphatase 02/20/2024 69  33 - 136 U/L Final    Total Protein 02/20/2024 7.5  6.4 - 8.2 g/dL Final    AST 02/20/2024 17  9 - 39 U/L Final    Bilirubin, Total 02/20/2024 0.5  0.0 - 1.2 mg/dL Final    ALT 02/20/2024 12  7 - 45 U/L Final    KALEB 02/20/2024 Positive (A)  Negative Final    KALEB Pattern 02/20/2024 Homogeneous   Final    KALEB Titer 02/20/2024 1:80   Final    Anti-SM 02/20/2024 <0.2  <1.0 AI Final    Anti-RNP 02/20/2024 <0.2  <1.0 AI Final    Anti-SM/RNP 02/20/2024 <0.2  <1.0 AI Final    Anti-SSA 02/20/2024 <0.2  <1.0 AI Final    Anti-SSB 02/20/2024 <0.2  <1.0 AI Final    Anti-SCL-70 02/20/2024 0.2  <1.0 AI Final     Anti-SMITA-1 IgG 02/20/2024 <0.2  <1.0 AI Final    Anti-Chromatin 02/20/2024 <0.2  <1.0 AI Final    Anti-Centromere 02/20/2024 <0.2  <1.0 AI Final    ANTI-RIBOSOMAL P 02/20/2024 <0.2  <1.0 AI Final    Anti-DNA (DS) 02/20/2024 1.0  <5.0 IU/mL Final   Lab on 11/11/2023   Component Date Value Ref Range Status    Glucose 11/11/2023 96  74 - 99 mg/dL Final    Sodium 11/11/2023 137  136 - 145 mmol/L Final    Potassium 11/11/2023 4.1  3.5 - 5.3 mmol/L Final    Chloride 11/11/2023 103  98 - 107 mmol/L Final    Bicarbonate 11/11/2023 26  21 - 32 mmol/L Final    Anion Gap 11/11/2023 12  10 - 20 mmol/L Final    Urea Nitrogen 11/11/2023 9  6 - 23 mg/dL Final    Creatinine 11/11/2023 0.60  0.50 - 1.05 mg/dL Final    eGFR 11/11/2023 >90  >60 mL/min/1.73m*2 Final    Calcium 11/11/2023 9.3  8.6 - 10.3 mg/dL Final    Albumin 11/11/2023 4.2  3.4 - 5.0 g/dL Final    Alkaline Phosphatase 11/11/2023 59  33 - 136 U/L Final    Total Protein 11/11/2023 7.0  6.4 - 8.2 g/dL Final    AST 11/11/2023 14  9 - 39 U/L Final    Bilirubin, Total 11/11/2023 0.6  0.0 - 1.2 mg/dL Final    ALT 11/11/2023 11  7 - 45 U/L Final    Sedimentation Rate 11/11/2023 48 (H)  0 - 30 mm/h Final    C-Reactive Protein 11/11/2023 3.69 (H)  <1.00 mg/dL Final     Labs above show inflammation and a low positive KALEB.    Assessment/Plan   Diagnoses and all orders for this visit:  Angioedema, initial encounter    Based on the type of persistent swelling that this patient is experiencing, I am not hearing concerns for food or medication allergy, contact allergy, or other common allergies.  We deferred allergy testing today.    I recommended that she for start with seeing her dentist to evaluate for any dental issue.  And from there, if negative, for her to see either ENT or an oral surgeon for evaluation and possible biopsy of the swollen tissue.      Henry Landin MD

## 2024-03-20 ENCOUNTER — TELEPHONE (OUTPATIENT)
Dept: PRIMARY CARE | Facility: CLINIC | Age: 64
End: 2024-03-20
Payer: COMMERCIAL

## 2024-03-20 DIAGNOSIS — R22.0 SWELLING OF LEFT SIDE OF FACE: Primary | ICD-10-CM

## 2024-03-20 NOTE — TELEPHONE ENCOUNTER
Pt called in stated cheek has been swollen since November was told to see dentist. Pt had appt today 3/20/24 with dentist and was told everything was healthy but the dentist recommenced that she see a oral surgeon. Pt just wanted to update you and stated to give her a call if you think she should go a different route.

## 2024-04-17 DIAGNOSIS — F32.1 CURRENT MODERATE EPISODE OF MAJOR DEPRESSIVE DISORDER WITHOUT PRIOR EPISODE (MULTI): ICD-10-CM

## 2024-04-17 NOTE — TELEPHONE ENCOUNTER
Given referral for ENT, scheduled for july25th. Patient saw a dentist and got xrays done. Dentist says that she needs a CT. Would you like to order this or wait for ENT? Dentist also says she can see an oral surgeon who will do the same thing as an ENT but cheaper. Please advise.

## 2024-04-18 NOTE — TELEPHONE ENCOUNTER
T called back she stated that the oral surgeon doesn't take her insurance and she made an appt with Dr Duran and her appt is 7/25/2024 she is asking if you can talk to the doctor and see if they can get her in sooner? She stated that she has been dealing with this for 7 months, she also stated that the Duloxetine isnt working she is getting hot flashes, dizzy and unstable when walking, she is asking for something different \.

## 2024-04-30 NOTE — TELEPHONE ENCOUNTER
Pt called again asking what she is suppose to do with the Duloxetine she would like to stop that due to dizziness and unstable on her feet and she would like a different med. Please advise.

## 2024-05-01 RX ORDER — VENLAFAXINE HYDROCHLORIDE 37.5 MG/1
37.5 CAPSULE, EXTENDED RELEASE ORAL DAILY
Qty: 30 CAPSULE | Refills: 11 | Status: SHIPPED | OUTPATIENT
Start: 2024-05-01 | End: 2025-05-01

## 2024-05-01 RX ORDER — DULOXETIN HYDROCHLORIDE 30 MG/1
CAPSULE, DELAYED RELEASE ORAL
Qty: 7 CAPSULE | Refills: 0 | Status: SHIPPED | OUTPATIENT
Start: 2024-05-01 | End: 2024-05-23 | Stop reason: ALTCHOICE

## 2024-05-01 NOTE — Clinical Note
Gonsalo -- I have Maribel Lakhani, on your schedule for July -- with left sided facial swelling.  Not apparently allergic or infectious.  Just checking to see what imaging you prefer -- MRI face vs CT head&neck (contrast?) -- I can get the orders in and Minto back with  you on results. MP

## 2024-05-01 NOTE — TELEPHONE ENCOUNTER
Spoke with the pt queued up medication and she stated that now she has diarrhea x10 she has tried imodium and Pepto has helped some but not stopping it she went 1 1/2 days with nothing and now its back what should she do ?

## 2024-05-02 DIAGNOSIS — R22.0 SWELLING OF LEFT SIDE OF FACE: Primary | ICD-10-CM

## 2024-05-21 ENCOUNTER — LAB (OUTPATIENT)
Dept: LAB | Facility: LAB | Age: 64
End: 2024-05-21
Payer: COMMERCIAL

## 2024-05-21 ENCOUNTER — TELEPHONE (OUTPATIENT)
Dept: SURGERY | Facility: CLINIC | Age: 64
End: 2024-05-21
Payer: COMMERCIAL

## 2024-05-21 DIAGNOSIS — R22.0 SWELLING OF LEFT SIDE OF FACE: ICD-10-CM

## 2024-05-21 LAB
CREAT SERPL-MCNC: 0.52 MG/DL (ref 0.5–1.05)
EGFRCR SERPLBLD CKD-EPI 2021: >90 ML/MIN/1.73M*2

## 2024-05-21 PROCEDURE — 36415 COLL VENOUS BLD VENIPUNCTURE: CPT

## 2024-05-22 ENCOUNTER — HOSPITAL ENCOUNTER (OUTPATIENT)
Dept: RADIOLOGY | Facility: HOSPITAL | Age: 64
Discharge: HOME | End: 2024-05-22
Payer: COMMERCIAL

## 2024-05-22 DIAGNOSIS — R22.0 SWELLING OF LEFT SIDE OF FACE: ICD-10-CM

## 2024-05-22 PROCEDURE — 2550000001 HC RX 255 CONTRASTS: Performed by: FAMILY MEDICINE

## 2024-05-22 PROCEDURE — 70487 CT MAXILLOFACIAL W/DYE: CPT | Performed by: RADIOLOGY

## 2024-05-22 PROCEDURE — 70487 CT MAXILLOFACIAL W/DYE: CPT

## 2024-05-22 RX ADMIN — IOHEXOL 75 ML: 350 INJECTION, SOLUTION INTRAVENOUS at 12:24

## 2024-05-23 ENCOUNTER — PRE-ADMISSION TESTING (OUTPATIENT)
Dept: PREADMISSION TESTING | Facility: HOSPITAL | Age: 64
End: 2024-05-23
Payer: COMMERCIAL

## 2024-05-23 ENCOUNTER — ANESTHESIA EVENT (OUTPATIENT)
Dept: ANESTHESIOLOGY | Facility: HOSPITAL | Age: 64
End: 2024-05-23

## 2024-05-23 VITALS — HEIGHT: 68 IN | BODY MASS INDEX: 22.43 KG/M2 | WEIGHT: 148 LBS

## 2024-05-23 PROBLEM — F41.9 ANXIETY: Status: ACTIVE | Noted: 2024-05-23

## 2024-05-23 SDOH — HEALTH STABILITY: MENTAL HEALTH: CURRENT SMOKER: 1

## 2024-05-23 ASSESSMENT — DUKE ACTIVITY SCORE INDEX (DASI)
CAN YOU HAVE SEXUAL RELATIONS: YES
CAN YOU RUN A SHORT DISTANCE: YES
CAN YOU PARTICIPATE IN MODERATE RECREATIONAL ACTIVITIES LIKE GOLF, BOWLING, DANCING, DOUBLES TENNIS OR THROWING A BASEBALL OR FOOTBALL: NO
CAN YOU CLIMB A FLIGHT OF STAIRS OR WALK UP A HILL: YES
CAN YOU WALK INDOORS, SUCH AS AROUND YOUR HOUSE: YES
CAN YOU DO LIGHT WORK AROUND THE HOUSE LIKE DUSTING OR WASHING DISHES: YES
CAN YOU DO MODERATE WORK AROUND THE HOUSE LIKE VACUUMING, SWEEPING FLOORS OR CARRYING GROCERIES: YES
CAN YOU DO HEAVY WORK AROUND THE HOUSE LIKE SCRUBBING FLOORS OR LIFTING AND MOVING HEAVY FURNITURE: YES
CAN YOU DO YARD WORK LIKE RAKING LEAVES, WEEDING OR PUSHING A MOWER: YES
TOTAL_SCORE: 44.7
DASI METS SCORE: 8.2
CAN YOU PARTICIPATE IN STRENOUS SPORTS LIKE SWIMMING, SINGLES TENNIS, FOOTBALL, BASKETBALL, OR SKIING: NO
CAN YOU WALK A BLOCK OR TWO ON LEVEL GROUND: YES
CAN YOU TAKE CARE OF YOURSELF (EAT, DRESS, BATHE, OR USE TOILET): YES

## 2024-05-23 NOTE — PREPROCEDURE INSTRUCTIONS
Medication List            Accurate as of May 23, 2024  8:59 AM. Always use your most recent med list.                diclofenac 75 mg EC tablet  Commonly known as: Voltaren  Take 1 tablet (75 mg) by mouth 2 times a day as needed (pain). Do not crush, chew, or split.  Medication Adjustments for Surgery: Take morning of surgery with sip of water, no other fluids     ezetimibe 10 mg tablet  Commonly known as: Zetia  Take 1 tablet (10 mg) by mouth once daily.  Medication Adjustments for Surgery: Take morning of surgery with sip of water, no other fluids  Notes to patient: FOLLOW INSTRUCTION ON PREP BOX     Sutab 1.479-0.188- 0.225 gram tablet  Generic drug: sod sulf-pot chloride-mag sulf  One kit   At 6pm  the night before take 12 pills in 20 min. And at 3.am the day of procedure take 12 pills in 20 min.  Medication Adjustments for Surgery: Take morning of surgery with sip of water, no other fluids     traZODone 100 mg tablet  Commonly known as: Desyrel  Take 1 tablet (100 mg) by mouth once daily.  Medication Adjustments for Surgery: Take morning of surgery with sip of water, no other fluids     venlafaxine XR 37.5 mg 24 hr capsule  Commonly known as: Effexor-XR  Take 1 capsule (37.5 mg) by mouth once daily. Do not crush or chew.  Medication Adjustments for Surgery: Take morning of surgery with sip of water, no other fluids     WOMEN'S 50 PLUS MULTIVITAMIN ORAL  Medication Adjustments for Surgery: Take morning of surgery with sip of water, no other fluids                              NPO Instructions:    NO SOLID FOOD after breakfast the day prior to your procedure- ONLY CLEAR LIQUIDS  You may have clear liquids up to THREE HOURS prior procedure  Start your prep at aprox 5p the evening prior to you procedure    Additional Instructions:     Review your medication instructions, take indicated medications  Wear  comfortable loose fitting clothing  Do not use moisturizers, creams, lotions or perfume  All jewelry and  valuables should be left at home  We will call you the business day before your procedure between 1-3p to confirm your procedure and arrival time  Please park in the back of the hospital, in the ED parking. Walk through the ED waiting room and take the hallway on the right to the elevator to the second floor. Once off the elevator, on the left you will see the Outpatient Services desk to check in at.   Please make arrangements to have a responsible adult take you home and stay with you for 24 hours  Please bring your ID and insurance card to your procedure  When checked in to the outpatient unit, you will be asked to change into a hospital gown and remove all clothing, including underwear  An IV will be inserted   Your family or  will be asked to wait in the waiting room or cafeteria and will be notified when able to come sit with you  You are able to take your normal medication THREE HOURS prior to procedure  Please call [phone] with any further questions

## 2024-05-23 NOTE — ANESTHESIA PREPROCEDURE EVALUATION
Patient: Maribel Lakhani    Procedure Information    Date: 24  Reason: PAT       There were no vitals filed for this visit.    Past Surgical History:   Procedure Laterality Date    DILATION AND CURETTAGE OF UTERUS  2014    Dilation And Curettage    FOOT SURGERY  2014    Foot Surgery    OTHER SURGICAL HISTORY  2014    Oral Surgery     Past Medical History:   Diagnosis Date    Abnormal finding on mammography 2023    Adhesive capsulitis of left shoulder 2019    Adhesive capsulitis of left shoulder    Adhesive capsulitis of right shoulder 2015    Adhesive capsulitis of right shoulder    Anxiety and depression 2023    # Hx Decreased moods c/w major depression and increased anxiety -- settled down.  stable off all SSRIs.    Bitten by cat, initial encounter 2015    Cat bite    Dry skin dermatitis 2023    Fatigue 2023    Hemangioma of skin and subcutaneous tissue 2022    Hemorrhoidal skin tag 2023    Melanocytic nevi of trunk 2022    Melanocytic nevi of unspecified lower limb, including hip 2022    Melanocytic nevi of unspecified part of face 2022    Melanocytic nevi of unspecified upper limb, including shoulder 2022    Neoplasm of uncertain behavior of skin 2022    Other conditions influencing health status     Menstruation    Other melanin hyperpigmentation 2022    Other seborrheic keratosis 2022    Other shoulder lesions, unspecified shoulder 2015    Rotator cuff tendonitis    Other viral warts 2022    Pain in left wrist 2021    Left wrist pain    Pain in right shoulder 2016    Right shoulder pain    Personal history of other complications of pregnancy, childbirth and the puerperium     History of spontaneous     Personal history of other diseases of the circulatory system 2014    History of varicose veins    Personal history of other diseases of the female genital  tract     Vaginal delivery    Personal history of other infectious and parasitic diseases     History of varicella    Personal history of other specified conditions 05/05/2014    History of urinary urgency    Petechiae 08/29/2023    Primary focal hyperhidrosis, 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 Passive rom exercises demonstrated. No longer using tramadol.    Unspecified disorder of nose and nasal sinuses 05/05/2014    Sinus problem    Unspecified visual loss 05/05/2014    Vision problems    Xerosis cutis 08/31/2022       Current Outpatient Medications:     diclofenac (Voltaren) 75 mg EC tablet, Take 1 tablet (75 mg) by mouth 2 times a day as needed (pain). Do not crush, chew, or split., Disp: 60 tablet, Rfl: 11    ezetimibe (Zetia) 10 mg tablet, Take 1 tablet (10 mg) by mouth once daily., Disp: 90 tablet, Rfl: 3    mv-mn/folic ac/calcium/vit K1 (WOMEN'S 50 PLUS MULTIVITAMIN ORAL), Take 1 tablet by mouth once daily., Disp: , Rfl:     Sutab 1.479-0.188- 0.225 gram tablet, One kit  At 6pm  the night before take 12 pills in 20 min. And at 3.am the day of procedure take 12 pills in 20 min., Disp: 24 tablet, Rfl: 0    traZODone (Desyrel) 100 mg tablet, Take 1 tablet (100 mg) by mouth once daily., Disp: 90 tablet, Rfl: 3    venlafaxine XR (Effexor-XR) 37.5 mg 24 hr capsule, Take 1 capsule (37.5 mg) by mouth once daily. Do not crush or chew., Disp: 30 capsule, Rfl: 11  No current facility-administered medications for this visit.  Prior to Admission medications    Medication Sig Start Date End Date Taking? Authorizing Provider   diclofenac (Voltaren) 75 mg EC tablet Take 1 tablet (75 mg) by mouth 2 times a day as needed (pain). Do not crush, chew, or split.  23 Yes Albert Berry MD   ezetimibe (Zetia) 10 mg tablet Take 1 tablet (10 mg) by mouth once daily. 24  Yes Albert Berry MD   mv-mn/folic ac/calcium/vit K1 (WOMEN'S 50 PLUS MULTIVITAMIN ORAL) Take 1 tablet by mouth once daily.   Yes Historical Provider, MD   Sutab 1.479-0.188- 0.225 gram tablet One kit   At 6pm  the night before take 12 pills in 20 min. And at 3.am the day of procedure take 12 pills in 20 min. 24  Yes Butch Harding MD   traZODone (Desyrel) 100 mg tablet Take 1 tablet (100 mg) by mouth once daily. 24 Yes Albert Berry MD   venlafaxine XR (Effexor-XR) 37.5 mg 24 hr capsule Take 1 capsule (37.5 mg) by mouth once daily. Do not crush or chew. 24 Yes Albert Berry MD   DULoxetine (Cymbalta) 30 mg DR capsule 1 po daily x 1 week then stop 24 Yes Albert Berry MD   escitalopram (Lexapro) 20 mg tablet Take 1 tablet (20 mg) by mouth once daily at bedtime. 23 Yes Albert Berry MD     Allergies   Allergen Reactions    Ampicillin Rash    Cefdinir Rash    [id number] Antimigraine Agents Anxiety     Social History     Tobacco Use    Smoking status: Every Day     Current packs/day: 0.00     Types: Cigarettes     Last attempt to quit: 2023     Years since quittin.0     Passive exposure: Past    Smokeless tobacco: Never   Substance Use Topics    Alcohol use: Yes     Alcohol/week: 2.0 standard drinks of alcohol     Types: 2 Glasses of wine per week     Comment: not triggering migraines         Chemistry    Lab Results   Component Value Date/Time     2024 1139    K 4.4 2024 1139     2024 1139    CO2 28 2024 1139    BUN 9 2024 1139    CREATININE 0.52 2024 1507    Lab Results   Component Value Date/Time    CALCIUM 9.4 2024 1139    ALKPHOS 69 2024 1139    AST 17 2024 1139    ALT 12 2024 1139    BILITOT 0.5 2024 1139     "      Lab Results   Component Value Date/Time    WBC 8.0 02/20/2024 1139    HGB 14.5 02/20/2024 1139    HCT 45.1 02/20/2024 1139     02/20/2024 1139     No results found for: \"PROTIME\", \"PTT\", \"INR\"  No results found for this or any previous visit (from the past 4464 hour(s)).  No results found for this or any previous visit from the past 1095 days.   Relevant Problems   Cardiac   (+) High cholesterol      Neuro   (+) Anxiety   (+) Current moderate episode of major depressive disorder without prior episode (Multi)      Musculoskeletal   (+) Lumbar spondylosis       Clinical information reviewed:   Tobacco  Allergies  Meds   Med Hx  Surg Hx   Fam Hx  Soc Hx        NPO Detail:  No data recorded     PHYSICAL EXAM    Anesthesia Plan    History of general anesthesia?: yes  History of complications of general anesthesia?: no    ASA 2     MAC     The patient is a current smoker.  Patient was previously instructed to abstain from smoking on day of procedure.    intravenous induction       "

## 2024-06-13 ENCOUNTER — ANESTHESIA (OUTPATIENT)
Dept: GASTROENTEROLOGY | Facility: HOSPITAL | Age: 64
End: 2024-06-13
Payer: COMMERCIAL

## 2024-06-13 ENCOUNTER — HOSPITAL ENCOUNTER (OUTPATIENT)
Dept: RADIOLOGY | Facility: HOSPITAL | Age: 64
Setting detail: OUTPATIENT SURGERY
Discharge: HOME | End: 2024-06-13
Payer: COMMERCIAL

## 2024-06-13 ENCOUNTER — ANESTHESIA EVENT (OUTPATIENT)
Dept: GASTROENTEROLOGY | Facility: HOSPITAL | Age: 64
End: 2024-06-13
Payer: COMMERCIAL

## 2024-06-13 ENCOUNTER — HOSPITAL ENCOUNTER (OUTPATIENT)
Dept: GASTROENTEROLOGY | Facility: HOSPITAL | Age: 64
Setting detail: OUTPATIENT SURGERY
Discharge: HOME | End: 2024-06-13
Payer: COMMERCIAL

## 2024-06-13 VITALS
OXYGEN SATURATION: 98 % | SYSTOLIC BLOOD PRESSURE: 107 MMHG | BODY MASS INDEX: 21.52 KG/M2 | HEART RATE: 67 BPM | TEMPERATURE: 98.4 F | RESPIRATION RATE: 18 BRPM | HEIGHT: 68 IN | WEIGHT: 142 LBS | DIASTOLIC BLOOD PRESSURE: 63 MMHG

## 2024-06-13 DIAGNOSIS — R93.3 ABNORMAL COLONOSCOPY: ICD-10-CM

## 2024-06-13 DIAGNOSIS — Z12.11 COLON CANCER SCREENING: ICD-10-CM

## 2024-06-13 PROCEDURE — 2500000004 HC RX 250 GENERAL PHARMACY W/ HCPCS (ALT 636 FOR OP/ED): Performed by: NURSE ANESTHETIST, CERTIFIED REGISTERED

## 2024-06-13 PROCEDURE — 45385 COLONOSCOPY W/LESION REMOVAL: CPT | Performed by: SURGERY

## 2024-06-13 PROCEDURE — 36415 COLL VENOUS BLD VENIPUNCTURE: CPT | Performed by: SURGERY

## 2024-06-13 PROCEDURE — 7100000010 HC PHASE TWO TIME - EACH INCREMENTAL 1 MINUTE

## 2024-06-13 PROCEDURE — 82378 CARCINOEMBRYONIC ANTIGEN: CPT | Mod: GENLAB | Performed by: SURGERY

## 2024-06-13 PROCEDURE — 2500000004 HC RX 250 GENERAL PHARMACY W/ HCPCS (ALT 636 FOR OP/ED)

## 2024-06-13 PROCEDURE — 7100000009 HC PHASE TWO TIME - INITIAL BASE CHARGE

## 2024-06-13 PROCEDURE — 45380 COLONOSCOPY AND BIOPSY: CPT | Performed by: SURGERY

## 2024-06-13 PROCEDURE — 2500000005 HC RX 250 GENERAL PHARMACY W/O HCPCS: Performed by: NURSE ANESTHETIST, CERTIFIED REGISTERED

## 2024-06-13 PROCEDURE — 3700000001 HC GENERAL ANESTHESIA TIME - INITIAL BASE CHARGE

## 2024-06-13 PROCEDURE — 74018 RADEX ABDOMEN 1 VIEW: CPT

## 2024-06-13 PROCEDURE — 45381 COLONOSCOPY SUBMUCOUS NJX: CPT | Performed by: SURGERY

## 2024-06-13 PROCEDURE — 3700000002 HC GENERAL ANESTHESIA TIME - EACH INCREMENTAL 1 MINUTE

## 2024-06-13 PROCEDURE — 2720000007 HC OR 272 NO HCPCS

## 2024-06-13 RX ORDER — SODIUM CHLORIDE, SODIUM LACTATE, POTASSIUM CHLORIDE, CALCIUM CHLORIDE 600; 310; 30; 20 MG/100ML; MG/100ML; MG/100ML; MG/100ML
20 INJECTION, SOLUTION INTRAVENOUS CONTINUOUS
Status: DISCONTINUED | OUTPATIENT
Start: 2024-06-13 | End: 2024-06-14 | Stop reason: HOSPADM

## 2024-06-13 RX ORDER — LIDOCAINE HYDROCHLORIDE 20 MG/ML
INJECTION, SOLUTION INFILTRATION; PERINEURAL AS NEEDED
Status: DISCONTINUED | OUTPATIENT
Start: 2024-06-13 | End: 2024-06-13

## 2024-06-13 RX ORDER — PROPOFOL 10 MG/ML
INJECTION, EMULSION INTRAVENOUS AS NEEDED
Status: DISCONTINUED | OUTPATIENT
Start: 2024-06-13 | End: 2024-06-13

## 2024-06-13 SDOH — HEALTH STABILITY: MENTAL HEALTH: CURRENT SMOKER: 1

## 2024-06-13 ASSESSMENT — PAIN - FUNCTIONAL ASSESSMENT
PAIN_FUNCTIONAL_ASSESSMENT: 0-10

## 2024-06-13 ASSESSMENT — PAIN SCALES - GENERAL
PAINLEVEL_OUTOF10: 1
PAIN_LEVEL: 0
PAINLEVEL_OUTOF10: 0 - NO PAIN

## 2024-06-13 ASSESSMENT — COLUMBIA-SUICIDE SEVERITY RATING SCALE - C-SSRS
1. IN THE PAST MONTH, HAVE YOU WISHED YOU WERE DEAD OR WISHED YOU COULD GO TO SLEEP AND NOT WAKE UP?: NO
6. HAVE YOU EVER DONE ANYTHING, STARTED TO DO ANYTHING, OR PREPARED TO DO ANYTHING TO END YOUR LIFE?: NO
2. HAVE YOU ACTUALLY HAD ANY THOUGHTS OF KILLING YOURSELF?: NO

## 2024-06-13 ASSESSMENT — ENCOUNTER SYMPTOMS
OCCASIONAL FEELINGS OF UNSTEADINESS: 0
DEPRESSION: 0
LOSS OF SENSATION IN FEET: 0

## 2024-06-13 ASSESSMENT — PATIENT HEALTH QUESTIONNAIRE - PHQ9
2. FEELING DOWN, DEPRESSED OR HOPELESS: NOT AT ALL
1. LITTLE INTEREST OR PLEASURE IN DOING THINGS: NOT AT ALL
SUM OF ALL RESPONSES TO PHQ9 QUESTIONS 1 AND 2: 0

## 2024-06-13 NOTE — H&P
History Of Present Illness  Maribel Lakhani is a 63 y.o. female presenting for a low risk colonoscopy     Past Medical History  Past Medical History:   Diagnosis Date    Abnormal finding on mammography 2023    Adhesive capsulitis of left shoulder 2019    Adhesive capsulitis of left shoulder    Adhesive capsulitis of right shoulder 2015    Adhesive capsulitis of right shoulder    Anxiety and depression 2023    # Hx Decreased moods c/w major depression and increased anxiety -- settled down.  stable off all SSRIs.    Bitten by cat, initial encounter 2015    Cat bite    Dry skin dermatitis 2023    Fatigue 2023    Hemangioma of skin and subcutaneous tissue 2022    Hemorrhoidal skin tag 2023    Melanocytic nevi of trunk 2022    Melanocytic nevi of unspecified lower limb, including hip 2022    Melanocytic nevi of unspecified part of face 2022    Melanocytic nevi of unspecified upper limb, including shoulder 2022    Neoplasm of uncertain behavior of skin 2022    Other conditions influencing health status     Menstruation    Other melanin hyperpigmentation 2022    Other seborrheic keratosis 2022    Other shoulder lesions, unspecified shoulder 2015    Rotator cuff tendonitis    Other viral warts 2022    Pain in left wrist 2021    Left wrist pain    Pain in right shoulder 2016    Right shoulder pain    Personal history of other complications of pregnancy, childbirth and the puerperium     History of spontaneous     Personal history of other diseases of the circulatory system 2014    History of varicose veins    Personal history of other diseases of the female genital tract     Vaginal delivery    Personal history of other infectious and parasitic diseases     History of varicella    Personal history of other specified conditions 2014    History of urinary urgency    Petechiae 2023     "Primary focal hyperhidrosis, 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 Passive rom exercises demonstrated. No longer using tramadol.    Unspecified disorder of nose and nasal sinuses 05/05/2014    Sinus problem    Unspecified visual loss 05/05/2014    Vision problems    Xerosis cutis 08/31/2022       Surgical History  Past Surgical History:   Procedure Laterality Date    DILATION AND CURETTAGE OF UTERUS  05/05/2014    Dilation And Curettage    FOOT SURGERY  03/11/2014    Foot Surgery    OTHER SURGICAL HISTORY  03/11/2014    Oral Surgery        Social History  She reports that she has been smoking cigarettes. She has been exposed to tobacco smoke. She has never used smokeless tobacco. She reports current alcohol use of about 2.0 standard drinks of alcohol per week. She reports that she does not use drugs.    Family History  Family History   Problem Relation Name Age of Onset    Lupus Mother      Sjogren's syndrome Mother      Fibromyalgia Mother          Allergies  Adhesive tape-silicones, Ampicillin, Cefdinir, and Ergwewmy-0-gy1 antimigraine agents    Review of Systems   All other systems reviewed and are negative.       Physical Exam  Constitutional:       Appearance: Normal appearance.   Cardiovascular:      Heart sounds: Normal heart sounds.   Pulmonary:      Breath sounds: Normal breath sounds and air entry.   Abdominal:      General: Abdomen is flat.      Palpations: Abdomen is soft.      Tenderness: There is no abdominal tenderness.   Neurological:      Mental Status: She is alert.          Last Recorded Vitals  Blood pressure 127/73, pulse 85, temperature 37 °C (98.6 °F), resp. rate 17, height 1.727 m (5' 8\"), weight 64.4 kg (142 lb), SpO2 " 98%.    COLONOSCOPY/BIOPSIES.  Risks include, but not limited to pain, infection, bleeding, perforation, missed lesions, aspiration, risk of cardiac, pulmonary, neurologic, locomotor, anesthetic events, incomplete colonoscopy, and other unforeseen complications including death      Butch Harding MD

## 2024-06-13 NOTE — DISCHARGE INSTRUCTIONS
Patient Instructions after a Colonoscopy      The anesthetics, sedatives or narcotics which were given to you today will be acting in your body for the next 24 hours, so you might feel a little sleepy or groggy.  This feeling should slowly wear off. Carefully read and follow the instructions.     You received sedation today:  - Do not drive or operate any machinery or power tools of any kind.   - No alcoholic beverages today, not even beer or wine.  - Do not make any important decisions or sign any legal documents.  - No over the counter medications that contain alcohol or that may cause drowsiness.  - Do not make any important decisions or sign any legal documents.  - Make sure you have someone with you for first 24 hours.    While it is common to experience mild to moderate abdominal distention, gas, or belching after your procedure, if any of these symptoms occur following discharge from the GI Lab or within one week of having your procedure, call the [facility] to be advised whether a visit to your nearest Urgent Care or Emergency Department is indicated.  Take this paper with you if you go.     - If you develop an allergic reaction to the medications that were given during your procedure such as difficulty breathing, rash, hives, severe nausea, vomiting or lightheadedness.  - If you experience chest pain, shortness of breath, severe abdominal pain, fevers and chills.  -If you develop signs and symptoms of bleeding such as blood in your spit, if your stools turn black, tarry, or bloody  - If you have not urinated within 8 hours following your procedure.  - If your IV site becomes painful, red, inflamed, or looks infected.    If you received a biopsy/polypectomy/sphincterotomy the following instructions apply below:    __ Do not use Aspirin containing products, non-steroidal medications or anti-coagulants for one week following your procedure. (Examples of these types of medications are: Advil,  Arthrotec, Aleve, Coumadin, Ecotrin, Heparin, Ibuprofen, Indocin, Motrin, Naprosyn, Nuprin, Plavix, Vioxx, and Voltarin, or their generic forms.  This list is not all-inclusive.  Check with your physician or pharmacist before resuming medications.)   __ Eat a soft diet today.  Avoid foods that are poorly digested for the next 24 hours.  These foods would include: nuts, beans, lettuce, red meats, and fried foods. Start with liquids and advance your diet as tolerated, gradually work up to eating solids.   __ Do not have a Barium Study or Enema for one week.    Your physician recommends the additional following instructions:    -You have a contact number available for emergencies. The signs and symptoms of potential delayed complications were discussed with you. You may return to normal activities tomorrow.  -Resume your previous diet.  -Continue your present medications.   -We are waiting for your pathology results.  -Your physician has recommended a repeat colonoscopy (date to be determined after pending pathology results are reviewed) for surveillance based on pathology results.  -The findings and recommendations have been discussed with you.  -The findings and recommendations were discussed with your family.  - Please see Medication Reconciliation Form for new medication/medications prescribed.       If you experience any problems or have any questions following discharge from the GI Lab, please call:        Nurse Signature                                                                        Date___________________                                                                            Patient/Responsible Party Signature                                        Date___________________

## 2024-06-13 NOTE — ANESTHESIA POSTPROCEDURE EVALUATION
Patient: Maribel Lakhani    Procedure Summary       Date: 06/13/24 Room / Location: [facility]    Anesthesia Start: 1015 Anesthesia Stop: 1108    Procedure: COLONOSCOPY Diagnosis: Colon cancer screening    Scheduled Providers: Butch Harding MD Responsible Provider: CELESTINE Ruano    Anesthesia Type: MAC ASA Status: 2            Anesthesia Type: MAC    Vitals Value Taken Time   /63 06/13/24 1139   Temp 36.9 °C (98.4 °F) 06/13/24 1108   Pulse 67 06/13/24 1139   Resp 18 06/13/24 1139   SpO2 98 % 06/13/24 1139       Anesthesia Post Evaluation    Patient location during evaluation: PACU  Patient participation: complete - patient participated  Level of consciousness: awake and alert  Pain score: 0  Pain management: adequate  Airway patency: patent  Cardiovascular status: acceptable  Respiratory status: acceptable  Hydration status: acceptable  Postoperative Nausea and Vomiting: none        There were no known notable events for this encounter.

## 2024-06-13 NOTE — ANESTHESIA PREPROCEDURE EVALUATION
Patient: Maribel Lakhani    Procedure Information       Date/Time: 24 1000    Scheduled providers: Butch Harding MD    Procedure: COLONOSCOPY    Location: [facility]          Vitals:    24 0906   BP: 127/73   Pulse: 85   Resp: 17   Temp: 37 °C (98.6 °F)   SpO2: 98%       Past Surgical History:   Procedure Laterality Date    DILATION AND CURETTAGE OF UTERUS  2014    Dilation And Curettage    FOOT SURGERY  2014    Foot Surgery    OTHER SURGICAL HISTORY  2014    Oral Surgery     Past Medical History:   Diagnosis Date    Abnormal finding on mammography 2023    Adhesive capsulitis of left shoulder 2019    Adhesive capsulitis of left shoulder    Adhesive capsulitis of right shoulder 2015    Adhesive capsulitis of right shoulder    Anxiety and depression 2023    # Hx Decreased moods c/w major depression and increased anxiety -- settled down.  stable off all SSRIs.    Bitten by cat, initial encounter 2015    Cat bite    Dry skin dermatitis 2023    Fatigue 2023    Hemangioma of skin and subcutaneous tissue 2022    Hemorrhoidal skin tag 2023    Melanocytic nevi of trunk 2022    Melanocytic nevi of unspecified lower limb, including hip 2022    Melanocytic nevi of unspecified part of face 2022    Melanocytic nevi of unspecified upper limb, including shoulder 2022    Neoplasm of uncertain behavior of skin 2022    Other conditions influencing health status     Menstruation    Other melanin hyperpigmentation 2022    Other seborrheic keratosis 2022    Other shoulder lesions, unspecified shoulder 2015    Rotator cuff tendonitis    Other viral warts 2022    Pain in left wrist 2021    Left wrist pain    Pain in right shoulder 2016    Right shoulder pain    Personal history of other complications of pregnancy, childbirth and the puerperium     History of spontaneous      Personal history of other diseases of the circulatory system 05/05/2014    History of varicose veins    Personal history of other diseases of the female genital tract     Vaginal delivery    Personal history of other infectious and parasitic diseases     History of varicella    Personal history of other specified conditions 05/05/2014    History of urinary urgency    Petechiae 08/29/2023    Primary focal hyperhidrosis, 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 Passive rom exercises demonstrated. No longer using tramadol.    Unspecified disorder of nose and nasal sinuses 05/05/2014    Sinus problem    Unspecified visual loss 05/05/2014    Vision problems    Xerosis cutis 08/31/2022       Current Outpatient Medications:     diclofenac (Voltaren) 75 mg EC tablet, Take 1 tablet (75 mg) by mouth 2 times a day as needed (pain). Do not crush, chew, or split., Disp: 60 tablet, Rfl: 11    ezetimibe (Zetia) 10 mg tablet, Take 1 tablet (10 mg) by mouth once daily., Disp: 90 tablet, Rfl: 3    mv-mn/folic ac/calcium/vit K1 (WOMEN'S 50 PLUS MULTIVITAMIN ORAL), Take 1 tablet by mouth once daily., Disp: , Rfl:     Sutab 1.479-0.188- 0.225 gram tablet, One kit  At 6pm  the night before take 12 pills in 20 min. And at 3.am the day of procedure take 12 pills in 20 min., Disp: 24 tablet, Rfl: 0    traZODone (Desyrel) 100 mg tablet, Take 1 tablet (100 mg) by mouth once daily., Disp: 90 tablet, Rfl: 3    venlafaxine XR (Effexor-XR) 37.5 mg 24 hr capsule, Take 1 capsule (37.5 mg) by mouth once daily. Do not crush or chew., Disp: 30 capsule, Rfl: 11    Current Facility-Administered Medications:     lactated Ringer's infusion, 20 mL/hr, intravenous, Continuous, Jennifer Bello,  RYLIE, Last Rate: 20 mL/hr at 24 0914, 20 mL/hr at 24 0914  Prior to Admission medications    Medication Sig Start Date End Date Taking? Authorizing Provider   diclofenac (Voltaren) 75 mg EC tablet Take 1 tablet (75 mg) by mouth 2 times a day as needed (pain). Do not crush, chew, or split. 23 Yes Albert Berry MD   ezetimibe (Zetia) 10 mg tablet Take 1 tablet (10 mg) by mouth once daily. 24  Yes Albert Berry MD   mv-mn/folic ac/calcium/vit K1 (WOMEN'S 50 PLUS MULTIVITAMIN ORAL) Take 1 tablet by mouth once daily.   Yes Historical Provider, MD Bootheab 1.479-0.188- 0.225 gram tablet One kit   At 6pm  the night before take 12 pills in 20 min. And at 3.am the day of procedure take 12 pills in 20 min. 24  Yes Butch Harding MD   traZODone (Desyrel) 100 mg tablet Take 1 tablet (100 mg) by mouth once daily. 24 Yes Albert Berry MD   venlafaxine XR (Effexor-XR) 37.5 mg 24 hr capsule Take 1 capsule (37.5 mg) by mouth once daily. Do not crush or chew. 24 Yes Albert Berry MD     Allergies   Allergen Reactions    Adhesive Tape-Silicones Itching    Ampicillin Rash    Cefdinir Rash    Svqirecf-[id number] Antimigraine Agents Anxiety     Social History     Tobacco Use    Smoking status: Every Day     Current packs/day: 0.00     Types: Cigarettes     Last attempt to quit: 2023     Years since quittin.1     Passive exposure: Past    Smokeless tobacco: Never   Substance Use Topics    Alcohol use: Yes     Alcohol/week: 2.0 standard drinks of alcohol     Types: 2 Glasses of wine per week     Comment: not triggering migraines         Chemistry    Lab Results   Component Value Date/Time     2024 1139    K 4.4 2024 1139     2024 1139    CO2 28 2024 1139    BUN 9 2024 1139    CREATININE 0.52 2024 1507    Lab Results   Component Value Date/Time    CALCIUM 9.4 2024 1139    ALKPHOS 69 2024 1139     "AST 17 02/20/2024 1139    ALT 12 02/20/2024 1139    BILITOT 0.5 02/20/2024 1139          Lab Results   Component Value Date/Time    WBC 8.0 02/20/2024 1139    HGB 14.5 02/20/2024 1139    HCT 45.1 02/20/2024 1139     02/20/2024 1139     No results found for: \"PROTIME\", \"PTT\", \"INR\"  No results found for this or any previous visit (from the past 4464 hour(s)).  No results found for this or any previous visit from the past 1095 days.   Relevant Problems   Cardiac   (+) High cholesterol      Neuro   (+) Anxiety   (+) Current moderate episode of major depressive disorder without prior episode (Multi)      Musculoskeletal   (+) Lumbar spondylosis       Clinical information reviewed:   Tobacco  Allergies  Meds   Med Hx  Surg Hx   Fam Hx  Soc Hx        NPO Detail:  NPO/Void Status  Carbohydrate Drink Given Prior to Surgery? : N  Date of Last Liquid: 06/13/24  Time of Last Liquid: 0700  Date of Last Solid: 06/12/24  Time of Last Solid: 0800  Last Intake Type: Clear fluids         Physical Exam    Airway  Mallampati: III  TM distance: <3 FB  Comments: Potential difficult anterior airway   Cardiovascular - normal exam     Dental    Pulmonary - normal exam     Abdominal - normal exam             Anesthesia Plan    History of general anesthesia?: yes  History of complications of general anesthesia?: no    ASA 2     MAC     The patient is a current smoker.  Patient was previously instructed to abstain from smoking on day of procedure.  Patient did not smoke on day of procedure.    intravenous induction   Anesthetic plan and risks discussed with patient.    Plan discussed with CRNA.      "

## 2024-06-14 LAB — CEA SERPL-MCNC: 3.4 UG/L

## 2024-06-14 ASSESSMENT — PAIN SCALES - GENERAL: PAINLEVEL_OUTOF10: 0 - NO PAIN

## 2024-06-28 ENCOUNTER — TELEPHONE (OUTPATIENT)
Dept: SURGERY | Facility: HOSPITAL | Age: 64
End: 2024-06-28
Payer: COMMERCIAL

## 2024-06-28 DIAGNOSIS — K63.89 COLONIC MASS: ICD-10-CM

## 2024-06-28 DIAGNOSIS — C18.4 MALIGNANT NEOPLASM OF TRANSVERSE COLON (MULTI): Primary | ICD-10-CM

## 2024-06-28 LAB
LAB AP ASR DISCLAIMER: NORMAL
LABORATORY COMMENT REPORT: NORMAL
PATH REPORT.FINAL DX SPEC: NORMAL
PATH REPORT.GROSS SPEC: NORMAL
PATH REPORT.TOTAL CANCER: NORMAL

## 2024-06-28 NOTE — TELEPHONE ENCOUNTER
I spoke to the patient today.  The biopsy of the mass in transverse colon did confirm adenocarcinoma.  She will be referred to colorectal surgery.  I will order CT scan of the chest abdomen pelvis to begin the workup.

## 2024-06-30 ENCOUNTER — DOCUMENTATION (OUTPATIENT)
Dept: SURGERY | Facility: HOSPITAL | Age: 64
End: 2024-06-30
Payer: COMMERCIAL

## 2024-06-30 NOTE — LETTER
June 30, 2024     Yann Cotter MD  [address]  [place] [address]    Patient: gume Lakhani   YOB: 1960   Date of Visit: 6/30/2024       Dear Yann     Thank you for seeing the patient with a transverse colon cancer     The endoscopy images are in the chart.  CEA Level is 3.4   A CT scan of the abdomen pelvis is pending    Sincerely    Butch Harding MD      CC: Albert Berry MD  ______________________________________________________________________________________

## 2024-07-01 LAB
LAB AP ASR DISCLAIMER: NORMAL
LABORATORY COMMENT REPORT: NORMAL
PATH REPORT.ADDENDUM SPEC: NORMAL
PATH REPORT.FINAL DX SPEC: NORMAL
PATH REPORT.GROSS SPEC: NORMAL
PATH REPORT.TOTAL CANCER: NORMAL

## 2024-07-09 ENCOUNTER — LAB (OUTPATIENT)
Dept: LAB | Facility: LAB | Age: 64
End: 2024-07-09
Payer: COMMERCIAL

## 2024-07-09 DIAGNOSIS — C18.4 MALIGNANT NEOPLASM OF TRANSVERSE COLON (MULTI): ICD-10-CM

## 2024-07-09 LAB
CREAT SERPL-MCNC: 0.64 MG/DL (ref 0.5–1.05)
EGFRCR SERPLBLD CKD-EPI 2021: >90 ML/MIN/1.73M*2

## 2024-07-09 PROCEDURE — 36415 COLL VENOUS BLD VENIPUNCTURE: CPT

## 2024-07-12 ENCOUNTER — APPOINTMENT (OUTPATIENT)
Dept: RADIOLOGY | Facility: HOSPITAL | Age: 64
End: 2024-07-12
Payer: COMMERCIAL

## 2024-07-12 ENCOUNTER — HOSPITAL ENCOUNTER (OUTPATIENT)
Dept: RADIOLOGY | Facility: HOSPITAL | Age: 64
Discharge: HOME | End: 2024-07-12
Payer: COMMERCIAL

## 2024-07-12 DIAGNOSIS — C18.4 MALIGNANT NEOPLASM OF TRANSVERSE COLON (MULTI): ICD-10-CM

## 2024-07-12 PROCEDURE — 74177 CT ABD & PELVIS W/CONTRAST: CPT

## 2024-07-12 PROCEDURE — 2550000001 HC RX 255 CONTRASTS: Performed by: SURGERY

## 2024-07-14 ENCOUNTER — TELEPHONE (OUTPATIENT)
Dept: SURGERY | Facility: HOSPITAL | Age: 64
End: 2024-07-14
Payer: COMMERCIAL

## 2024-07-14 DIAGNOSIS — I51.89 ATRIAL MASS: ICD-10-CM

## 2024-07-14 DIAGNOSIS — I51.89 LEFT ATRIAL MASS: Primary | ICD-10-CM

## 2024-07-14 NOTE — TELEPHONE ENCOUNTER
I contacted the patient today regarding her CT scan of the chest abdomen pelvis.  She was noted to have a possible mass in the left atrium.  A stat echo has been ordered for 7/15/2024.  She will undergo this prior to her appointment with colorectal surgery.  She understood and agreed to the plan as discussed.

## 2024-07-15 ENCOUNTER — HOSPITAL ENCOUNTER (OUTPATIENT)
Dept: CARDIOLOGY | Facility: HOSPITAL | Age: 64
Discharge: HOME | End: 2024-07-15
Payer: COMMERCIAL

## 2024-07-15 DIAGNOSIS — I51.89 LEFT ATRIAL MASS: ICD-10-CM

## 2024-07-15 DIAGNOSIS — D15.1 MYXOMA OF HEART (HHS-HCC): ICD-10-CM

## 2024-07-15 LAB
AORTIC VALVE PEAK VELOCITY: 1.54 M/S
AV PEAK GRADIENT: 9.5 MMHG
AVA (PEAK VEL): 3.82 CM2
EJECTION FRACTION APICAL 4 CHAMBER: 63.4
EJECTION FRACTION: 65 %
LEFT ATRIUM VOLUME AREA LENGTH INDEX BSA: 34.4 ML/M2
LEFT VENTRICLE INTERNAL DIMENSION DIASTOLE: 4.04 CM (ref 3.5–6)
LEFT VENTRICULAR OUTFLOW TRACT DIAMETER: 2.4 CM
MITRAL VALVE E/A RATIO: 1.04
MITRAL VALVE E/E' RATIO: 20.4
RIGHT VENTRICLE FREE WALL PEAK S': 22.4 CM/S
TRICUSPID ANNULAR PLANE SYSTOLIC EXCURSION: 2.8 CM

## 2024-07-15 PROCEDURE — 93306 TTE W/DOPPLER COMPLETE: CPT | Performed by: STUDENT IN AN ORGANIZED HEALTH CARE EDUCATION/TRAINING PROGRAM

## 2024-07-15 PROCEDURE — 93306 TTE W/DOPPLER COMPLETE: CPT

## 2024-07-16 DIAGNOSIS — D15.1 MYXOMA OF HEART (HHS-HCC): ICD-10-CM

## 2024-07-17 ENCOUNTER — OFFICE VISIT (OUTPATIENT)
Dept: CARDIAC SURGERY | Facility: HOSPITAL | Age: 64
End: 2024-07-17
Payer: COMMERCIAL

## 2024-07-17 VITALS
HEIGHT: 68 IN | SYSTOLIC BLOOD PRESSURE: 124 MMHG | OXYGEN SATURATION: 96 % | DIASTOLIC BLOOD PRESSURE: 80 MMHG | HEART RATE: 77 BPM | WEIGHT: 148 LBS | BODY MASS INDEX: 22.43 KG/M2

## 2024-07-17 DIAGNOSIS — R07.9 CHEST PAIN, UNSPECIFIED TYPE: ICD-10-CM

## 2024-07-17 DIAGNOSIS — D15.1 MYXOMA OF HEART (HHS-HCC): Primary | ICD-10-CM

## 2024-07-17 PROCEDURE — 99215 OFFICE O/P EST HI 40 MIN: CPT | Performed by: STUDENT IN AN ORGANIZED HEALTH CARE EDUCATION/TRAINING PROGRAM

## 2024-07-17 PROCEDURE — 3008F BODY MASS INDEX DOCD: CPT | Performed by: STUDENT IN AN ORGANIZED HEALTH CARE EDUCATION/TRAINING PROGRAM

## 2024-07-17 PROCEDURE — 99205 OFFICE O/P NEW HI 60 MIN: CPT | Performed by: STUDENT IN AN ORGANIZED HEALTH CARE EDUCATION/TRAINING PROGRAM

## 2024-07-17 RX ORDER — SODIUM CHLORIDE, SODIUM LACTATE, POTASSIUM CHLORIDE, CALCIUM CHLORIDE 600; 310; 30; 20 MG/100ML; MG/100ML; MG/100ML; MG/100ML
20 INJECTION, SOLUTION INTRAVENOUS CONTINUOUS
Status: CANCELLED | OUTPATIENT
Start: 2024-07-17

## 2024-07-17 ASSESSMENT — ENCOUNTER SYMPTOMS
OCCASIONAL FEELINGS OF UNSTEADINESS: 0
DEPRESSION: 1
LOSS OF SENSATION IN FEET: 0

## 2024-07-17 ASSESSMENT — PAIN SCALES - GENERAL: PAINLEVEL: 0-NO PAIN

## 2024-07-18 DIAGNOSIS — E78.00 HIGH CHOLESTEROL: ICD-10-CM

## 2024-07-18 PROBLEM — D15.1 MYXOMA OF HEART (HHS-HCC): Status: ACTIVE | Noted: 2024-07-17

## 2024-07-19 ENCOUNTER — OFFICE VISIT (OUTPATIENT)
Dept: SURGERY | Facility: CLINIC | Age: 64
End: 2024-07-19
Payer: COMMERCIAL

## 2024-07-19 VITALS
WEIGHT: 148.6 LBS | TEMPERATURE: 98.7 F | OXYGEN SATURATION: 98 % | SYSTOLIC BLOOD PRESSURE: 127 MMHG | HEART RATE: 76 BPM | BODY MASS INDEX: 22.52 KG/M2 | DIASTOLIC BLOOD PRESSURE: 81 MMHG | HEIGHT: 68 IN

## 2024-07-19 DIAGNOSIS — K63.89 COLONIC MASS: ICD-10-CM

## 2024-07-19 PROCEDURE — 99205 OFFICE O/P NEW HI 60 MIN: CPT | Performed by: SURGERY

## 2024-07-19 PROCEDURE — 99215 OFFICE O/P EST HI 40 MIN: CPT | Performed by: SURGERY

## 2024-07-19 PROCEDURE — 3008F BODY MASS INDEX DOCD: CPT | Performed by: SURGERY

## 2024-07-19 ASSESSMENT — PATIENT HEALTH QUESTIONNAIRE - PHQ9
10. IF YOU CHECKED OFF ANY PROBLEMS, HOW DIFFICULT HAVE THESE PROBLEMS MADE IT FOR YOU TO DO YOUR WORK, TAKE CARE OF THINGS AT HOME, OR GET ALONG WITH OTHER PEOPLE: VERY DIFFICULT
9. THOUGHTS THAT YOU WOULD BE BETTER OFF DEAD, OR OF HURTING YOURSELF: NOT AT ALL
4. FEELING TIRED OR HAVING LITTLE ENERGY: SEVERAL DAYS
2. FEELING DOWN, DEPRESSED OR HOPELESS: NEARLY EVERY DAY
5. POOR APPETITE OR OVEREATING: NOT AT ALL
7. TROUBLE CONCENTRATING ON THINGS, SUCH AS READING THE NEWSPAPER OR WATCHING TELEVISION: SEVERAL DAYS
1. LITTLE INTEREST OR PLEASURE IN DOING THINGS: NEARLY EVERY DAY
8. MOVING OR SPEAKING SO SLOWLY THAT OTHER PEOPLE COULD HAVE NOTICED. OR THE OPPOSITE, BEING SO FIGETY OR RESTLESS THAT YOU HAVE BEEN MOVING AROUND A LOT MORE THAN USUAL: NOT AT ALL
SUM OF ALL RESPONSES TO PHQ9 QUESTIONS 1 & 2: 6
SUM OF ALL RESPONSES TO PHQ QUESTIONS 1-9: 8
6. FEELING BAD ABOUT YOURSELF - OR THAT YOU ARE A FAILURE OR HAVE LET YOURSELF OR YOUR FAMILY DOWN: NOT AT ALL
3. TROUBLE FALLING OR STAYING ASLEEP: NOT AT ALL

## 2024-07-19 ASSESSMENT — PAIN SCALES - GENERAL: PAINLEVEL: 0-NO PAIN

## 2024-07-19 ASSESSMENT — ENCOUNTER SYMPTOMS
LOSS OF SENSATION IN FEET: 0
DEPRESSION: 0
OCCASIONAL FEELINGS OF UNSTEADINESS: 0

## 2024-07-19 NOTE — PROGRESS NOTES
History Of Present Illness  gume Lakhani is a 63 y.o. female presenting with transverse colon cancer. Referral from Dr. Harding.   CT CAP   IMPRESSION:  A large filling defect seen in the left atrium which may represent  myxoma versus thrombus. Echocardiogram advised.      No features of metastatic disease to the chest. Reported history of  colonic mass. Stool obscures evaluation of the colon. Slight hepatic  steatosis. Senescent changes detected.      TTE  ONCLUSIONS:   1. The left ventricular systolic function is normal, with a Santoyo's biplane calculated ejection fraction of 65%.   2. There is normal right ventricular global systolic function.   3. The left atrium is mildly dilated.   4. Large mobile, penduculated mass seen attached to the atrial aspect of the anterior leaflet of the mitral valve measuring 6.3 x 2.4 cm, resulting in moderate to severe obstruction fo the mitral valve flow with mild mitral regurgitation. 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   5. Dr. Harding has been notified via EPIC secure message.    C scope  3 subcentimeter polyps were removed  Single malignant-appearing mass in the mid transverse colon, covering one third of the circumference; performed cold forceps biopsy with partial removal; placed 1 clip successfully; tattooed 3 cm distal to the finding  Diverticulosis in the sigmoid colon    Path  FINAL DIAGNOSIS   A.  TRANSVERSE COLON POLYP:   -- COLONIC MUCOSA WITH HYPERPLASTIC TYPE CHANGES  -- NO DYSPLASTIC EPITHELIUM IDENTIFIED      B.  TRANSVERSE COLON MASS, BIOPSY:   -- INVASIVE ADENOCARCINOMA, MODERATELY DIFFERENTIATED, SEE NOTE     Note: Immunohistochemical stains for DNA mismatch repair proteins ordered and results will be reported in addendum.      C. RECTUM POLYP X 2:      -- FRAGMENTS OF TUBULAR ADENOMA   -- HYPERPLASTIC POLYP      MMR proficient     No prior abdominal surgery, no GI symptoms      Past Medical History  She has a past medical history of Abnormal finding  on mammography (08/29/2023), Adhesive capsulitis of left shoulder (01/25/2019), Adhesive capsulitis of right shoulder (12/03/2015), Anxiety and depression (08/29/2023), Bitten by cat, initial encounter (05/14/2015), Dry skin dermatitis (08/29/2023), Fatigue (08/29/2023), Hemangioma of skin and subcutaneous tissue (08/31/2022), Hemorrhoidal skin tag (08/29/2023), Melanocytic nevi of trunk (08/31/2022), Melanocytic nevi of unspecified lower limb, including hip (08/31/2022), Melanocytic nevi of unspecified part of face (08/31/2022), Melanocytic nevi of unspecified upper limb, including shoulder (08/31/2022), Neoplasm of uncertain behavior of skin (08/31/2022), Other conditions influencing health status, Other melanin hyperpigmentation (08/31/2022), Other seborrheic keratosis (08/31/2022), Other shoulder lesions, unspecified shoulder (12/21/2015), Other viral warts (08/31/2022), Pain in left wrist (08/18/2021), Pain in right shoulder (01/14/2016), Personal history of other complications of pregnancy, childbirth and the puerperium, Personal history of other diseases of the circulatory system (05/05/2014), Personal history of other diseases of the female genital tract, Personal history of other infectious and parasitic diseases, Personal history of other specified conditions (05/05/2014), Petechiae (08/29/2023), Primary focal hyperhidrosis, 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    Immunization History   Administered Date(s) Administered    Flu vaccine (IIV4), preservative free *Check age/dose* 11/10/2023    Pfizer Purple Cap SARS-CoV-2 03/14/2021, 04/04/2021, 01/04/2022    Tdap vaccine, age 7 year  and older (BOOSTRIX, ADACEL) 02/19/2019     Surgical History  She has a past surgical history that includes Foot surgery (03/11/2014); Other surgical history (03/11/2014); and Dilation and curettage of uterus (05/05/2014).     Social History  She reports that she has been smoking cigarettes. She has been exposed to tobacco smoke. She has never used smokeless tobacco. She reports current alcohol use of about 2.0 standard drinks of alcohol per week. She reports that she does not use drugs.    Family History  Her family history includes Fibromyalgia in her mother; Lupus in her mother; Sjogren's syndrome in her mother.     Allergies  Adhesive tape-silicones, Ampicillin, Cefdinir, and [id number]-[id number] antimigraine agents      Review of Systems   All other systems reviewed and are negative.       Physical Exam  Constitutional:       Appearance: Normal appearance.   HENT:      Head: Normocephalic.   Cardiovascular:      Rate and Rhythm: Normal rate and regular rhythm.      Pulses: Normal pulses.   Pulmonary:      Effort: Pulmonary effort is normal.   Abdominal:      General: Bowel sounds are normal.      Palpations: Abdomen is soft.   Musculoskeletal:         General: Normal range of motion.   Skin:     General: Skin is warm.   Neurological:      General: No focal deficit present.      Mental Status: She is alert.   Psychiatric:         Mood and Affect: Mood normal.         Behavior: Behavior normal.          Vitals                        Relevant Results         Assessment/Plan   Problem List Items Addressed This Visit    None  Visit Diagnoses         Codes    Colonic mass     K63.89           Laparoscopic extended right colectomy  Timing dependent on upcoming cardiac surgery and postop recovery/anticoagulation recommendations    Risks and benefits of surgery were discussed with the patient including, but not limited to: conversion to an open procedure, infection, bleeding, wound healing issues, anastomotic leak or  stricture, damage to surrounding structures (including the ureters, nerves, and major blood vessels), change in bowel habits, ostomy, incontinence, impotence, risks with anesthesia,  blood clot, heart attack, stroke, COVID infection, and death.

## 2024-07-23 ENCOUNTER — LAB (OUTPATIENT)
Dept: LAB | Facility: LAB | Age: 64
End: 2024-07-23
Payer: COMMERCIAL

## 2024-07-23 DIAGNOSIS — E78.00 HIGH CHOLESTEROL: ICD-10-CM

## 2024-07-23 LAB
ANION GAP SERPL CALC-SCNC: 20 MMOL/L (ref 10–20)
BUN SERPL-MCNC: 15 MG/DL (ref 6–23)
CALCIUM SERPL-MCNC: 9.4 MG/DL (ref 8.6–10.3)
CHLORIDE SERPL-SCNC: 104 MMOL/L (ref 98–107)
CO2 SERPL-SCNC: 23 MMOL/L (ref 21–32)
CREAT SERPL-MCNC: 0.8 MG/DL (ref 0.5–1.05)
EGFRCR SERPLBLD CKD-EPI 2021: 83 ML/MIN/1.73M*2
ERYTHROCYTE [DISTWIDTH] IN BLOOD BY AUTOMATED COUNT: 14.3 % (ref 11.5–14.5)
GLUCOSE SERPL-MCNC: 83 MG/DL (ref 74–99)
HCT VFR BLD AUTO: 43.7 % (ref 36–46)
HGB BLD-MCNC: 14.4 G/DL (ref 12–16)
MCH RBC QN AUTO: 31.9 PG (ref 26–34)
MCHC RBC AUTO-ENTMCNC: 33 G/DL (ref 32–36)
MCV RBC AUTO: 97 FL (ref 80–100)
NRBC BLD-RTO: 0 /100 WBCS (ref 0–0)
PLATELET # BLD AUTO: 365 X10*3/UL (ref 150–450)
POTASSIUM SERPL-SCNC: 4.5 MMOL/L (ref 3.5–5.3)
RBC # BLD AUTO: 4.52 X10*6/UL (ref 4–5.2)
SODIUM SERPL-SCNC: 142 MMOL/L (ref 136–145)
WBC # BLD AUTO: 7.2 X10*3/UL (ref 4.4–11.3)

## 2024-07-23 PROCEDURE — 36415 COLL VENOUS BLD VENIPUNCTURE: CPT

## 2024-07-25 ENCOUNTER — APPOINTMENT (OUTPATIENT)
Dept: OTOLARYNGOLOGY | Facility: CLINIC | Age: 64
End: 2024-07-25
Payer: COMMERCIAL

## 2024-07-26 ENCOUNTER — HOSPITAL ENCOUNTER (OUTPATIENT)
Facility: HOSPITAL | Age: 64
Setting detail: OUTPATIENT SURGERY
Discharge: HOME | End: 2024-07-26
Attending: INTERNAL MEDICINE | Admitting: INTERNAL MEDICINE
Payer: COMMERCIAL

## 2024-07-26 VITALS
HEART RATE: 62 BPM | SYSTOLIC BLOOD PRESSURE: 101 MMHG | BODY MASS INDEX: 22.52 KG/M2 | DIASTOLIC BLOOD PRESSURE: 65 MMHG | RESPIRATION RATE: 18 BRPM | OXYGEN SATURATION: 99 % | WEIGHT: 148.59 LBS | HEIGHT: 68 IN

## 2024-07-26 DIAGNOSIS — D15.1 BENIGN NEOPLASM OF HEART (HHS-HCC): ICD-10-CM

## 2024-07-26 DIAGNOSIS — I25.10 ATHEROSCLEROTIC HEART DISEASE OF NATIVE CORONARY ARTERY WITHOUT ANGINA PECTORIS: ICD-10-CM

## 2024-07-26 PROCEDURE — 7100000009 HC PHASE TWO TIME - INITIAL BASE CHARGE: Performed by: INTERNAL MEDICINE

## 2024-07-26 PROCEDURE — C1894 INTRO/SHEATH, NON-LASER: HCPCS | Performed by: INTERNAL MEDICINE

## 2024-07-26 PROCEDURE — C1887 CATHETER, GUIDING: HCPCS | Performed by: INTERNAL MEDICINE

## 2024-07-26 PROCEDURE — 2500000004 HC RX 250 GENERAL PHARMACY W/ HCPCS (ALT 636 FOR OP/ED): Performed by: INTERNAL MEDICINE

## 2024-07-26 PROCEDURE — 93458 L HRT ARTERY/VENTRICLE ANGIO: CPT | Performed by: INTERNAL MEDICINE

## 2024-07-26 PROCEDURE — 96373 THER/PROPH/DIAG INJ IA: CPT | Performed by: INTERNAL MEDICINE

## 2024-07-26 PROCEDURE — 2500000005 HC RX 250 GENERAL PHARMACY W/O HCPCS: Performed by: INTERNAL MEDICINE

## 2024-07-26 PROCEDURE — 2550000001 HC RX 255 CONTRASTS: Performed by: INTERNAL MEDICINE

## 2024-07-26 PROCEDURE — 2720000007 HC OR 272 NO HCPCS: Performed by: INTERNAL MEDICINE

## 2024-07-26 PROCEDURE — 7100000010 HC PHASE TWO TIME - EACH INCREMENTAL 1 MINUTE: Performed by: INTERNAL MEDICINE

## 2024-07-26 RX ORDER — MIDAZOLAM HYDROCHLORIDE 1 MG/ML
INJECTION, SOLUTION INTRAMUSCULAR; INTRAVENOUS AS NEEDED
Status: DISCONTINUED | OUTPATIENT
Start: 2024-07-26 | End: 2024-07-26 | Stop reason: HOSPADM

## 2024-07-26 RX ORDER — FENTANYL CITRATE 50 UG/ML
INJECTION, SOLUTION INTRAMUSCULAR; INTRAVENOUS AS NEEDED
Status: DISCONTINUED | OUTPATIENT
Start: 2024-07-26 | End: 2024-07-26 | Stop reason: HOSPADM

## 2024-07-26 RX ORDER — SODIUM CHLORIDE 9 MG/ML
INJECTION, SOLUTION INTRAVENOUS CONTINUOUS PRN
Status: COMPLETED | OUTPATIENT
Start: 2024-07-26 | End: 2024-07-26

## 2024-07-26 RX ORDER — VERAPAMIL HYDROCHLORIDE 2.5 MG/ML
INJECTION, SOLUTION INTRAVENOUS AS NEEDED
Status: DISCONTINUED | OUTPATIENT
Start: 2024-07-26 | End: 2024-07-26 | Stop reason: HOSPADM

## 2024-07-26 RX ORDER — NITROGLYCERIN 40 MG/100ML
INJECTION INTRAVENOUS AS NEEDED
Status: DISCONTINUED | OUTPATIENT
Start: 2024-07-26 | End: 2024-07-26 | Stop reason: HOSPADM

## 2024-07-26 RX ORDER — LIDOCAINE HYDROCHLORIDE 20 MG/ML
INJECTION, SOLUTION INFILTRATION; PERINEURAL AS NEEDED
Status: DISCONTINUED | OUTPATIENT
Start: 2024-07-26 | End: 2024-07-26 | Stop reason: HOSPADM

## 2024-07-26 RX ORDER — HEPARIN SODIUM 1000 [USP'U]/ML
INJECTION, SOLUTION INTRAVENOUS; SUBCUTANEOUS AS NEEDED
Status: DISCONTINUED | OUTPATIENT
Start: 2024-07-26 | End: 2024-07-26 | Stop reason: HOSPADM

## 2024-07-26 ASSESSMENT — PAIN SCALES - GENERAL
PAINLEVEL_OUTOF10: 0 - NO PAIN

## 2024-07-26 ASSESSMENT — PAIN - FUNCTIONAL ASSESSMENT
PAIN_FUNCTIONAL_ASSESSMENT: 0-10

## 2024-07-26 ASSESSMENT — ENCOUNTER SYMPTOMS
EYES NEGATIVE: 1
RESPIRATORY NEGATIVE: 1
CARDIOVASCULAR NEGATIVE: 1
CONSTITUTIONAL NEGATIVE: 1
MUSCULOSKELETAL NEGATIVE: 1
PSYCHIATRIC NEGATIVE: 1
HEMATOLOGIC/LYMPHATIC NEGATIVE: 1
NEUROLOGICAL NEGATIVE: 1

## 2024-07-26 NOTE — SIGNIFICANT EVENT
The right radial site is unchanged. The pt is drinking ice water and eating cookies at this time.

## 2024-07-26 NOTE — SIGNIFICANT EVENT
The right radial site is unchanged. The radial band was removed at this time. The site was cleaned and a new dressing was placed over the site. Also, a arm board was placed on the palm of the right palm. Discharge paperwork was given to the pt. The pt stated she understood.

## 2024-07-26 NOTE — H&P
History Of Present Illness  Maribel Lakhani is a 63 y.o. female presenting for [facility] prior to atrial myoxma surgery. Patient was referred by general surgery. Patient had seen Dr. Harding who completed a colonoscopy noted a mass, was later identified as transverse colon cancer. Patient was referred for CT scan to identify if lesion was isolated when a mass was identified, echocardiogram recommended that showed large mobile atrial aspect of anterior leaflet of the mitral valve mass with mod-severe obstruction of the mitral valve flow. Patient referred for cardiac surgery, recommended [facility] for evaluation definitive evaluation prior to cardiac surgery.      Past Medical History  Past Medical History:   Diagnosis Date    Abnormal finding on mammography 08/29/2023    Adhesive capsulitis of left shoulder 01/25/2019    Adhesive capsulitis of left shoulder    Adhesive capsulitis of right shoulder 12/03/2015    Adhesive capsulitis of right shoulder    Anxiety and depression 08/29/2023    # Hx Decreased moods c/w major depression and increased anxiety -- settled down.  stable off all SSRIs.    Bitten by cat, initial encounter 05/14/2015    Cat bite    Colon cancer (Multi)     Dry skin dermatitis 08/29/2023    Fatigue 08/29/2023    Hemangioma of skin and subcutaneous tissue 08/31/2022    Hemorrhoidal skin tag 08/29/2023    Hyperlipidemia     Melanocytic nevi of trunk 08/31/2022    Melanocytic nevi of unspecified lower limb, including hip 08/31/2022    Melanocytic nevi of unspecified part of face 08/31/2022    Melanocytic nevi of unspecified upper limb, including shoulder 08/31/2022    Neoplasm of uncertain behavior of skin 08/31/2022    Other conditions influencing health status     Menstruation    Other melanin hyperpigmentation 08/31/2022    Other seborrheic keratosis 08/31/2022    Other shoulder lesions, unspecified shoulder 12/21/2015    Rotator cuff tendonitis    Other viral warts 08/31/2022    Pain in left wrist 08/18/2021     Left wrist pain    Pain in right shoulder 2016    Right shoulder pain    Personal history of other complications of pregnancy, childbirth and the puerperium     History of spontaneous     Personal history of other diseases of the circulatory system 2014    History of varicose veins    Personal history of other diseases of the female genital tract     Vaginal delivery    Personal history of other infectious and parasitic diseases     History of varicella    Personal history of other specified conditions 2014    History of urinary urgency    Petechiae 2023    Primary focal hyperhidrosis, soles 2022    Rash and other nonspecific skin eruption 2022    Scar condition and fibrosis of skin 2022    Seborrheic dermatitis 2023    Skin neoplasm 2023    Tendinitis of left rotator cuff 2023    # hx Left Rotator cuff tendinitis -- resolved with HEP and csi 18. Passive rom exercises demonstrated. No longer using tramadol.    Unspecified disorder of nose and nasal sinuses 2014    Sinus problem    Unspecified visual loss 2014    Vision problems    Xerosis cutis 2022       Surgical History  Past Surgical History:   Procedure Laterality Date    DILATION AND CURETTAGE OF UTERUS  2014    Dilation And Curettage    FOOT SURGERY  2014    Foot Surgery    OTHER SURGICAL HISTORY  2014    Oral Surgery        Social History  She reports that she has been smoking cigarettes. She has a 7.5 pack-year smoking history. She has been exposed to tobacco smoke. She has never used smokeless tobacco. She reports current alcohol use of about 4.0 standard drinks of alcohol per week. She reports that she does not use drugs.    Family History  Family History   Problem Relation Name Age of Onset    Lupus Mother      Sjogren's syndrome Mother      Fibromyalgia Mother      Cancer Father JASON Mercado     Hearing loss Father JASON Mercado      "Cancer Maternal Grandmother Catia Santa         Allergies  Adhesive tape-silicones, Ampicillin, Cefdinir, and Hctopmsw-6-ii7 antimigraine agents    Review of Systems   Constitutional: Negative.    HENT: Negative.     Eyes: Negative.    Respiratory: Negative.     Cardiovascular: Negative.    Genitourinary: Negative.    Musculoskeletal: Negative.    Neurological: Negative.    Hematological: Negative.    Psychiatric/Behavioral: Negative.          Physical Exam  Constitutional:       Appearance: Normal appearance.   HENT:      Head: Normocephalic.      Nose: Nose normal.      Mouth/Throat:      Mouth: Mucous membranes are moist.   Eyes:      Conjunctiva/sclera: Conjunctivae normal.   Cardiovascular:      Rate and Rhythm: Normal rate and regular rhythm.   Pulmonary:      Effort: Pulmonary effort is normal.      Breath sounds: Normal breath sounds.   Abdominal:      Palpations: Abdomen is soft.   Musculoskeletal:         General: Normal range of motion.      Cervical back: Normal range of motion.   Skin:     General: Skin is warm and dry.   Neurological:      General: No focal deficit present.      Mental Status: She is alert. Mental status is at baseline.   Psychiatric:         Mood and Affect: Mood normal.         Behavior: Behavior normal.          Last Recorded Vitals  Blood pressure 125/78, pulse 65, resp. rate 16, height 1.727 m (5' 8\"), weight 67.4 kg (148 lb 9.4 oz), SpO2 100%.    Relevant Results        Home Meds:   Zetia 10mg daily  Voltaren 75mg BID  Multivitamin Daily  Trazodone 100mg daily  Effexor 37.5mg daily     No results found for this or any previous visit (from the past 24 hour(s)).  No results found.           Assessment/Plan   Active Problems:  There are no active Hospital Problems.      Maribel Lakhani is a 64 yo female with a pMH of Transverse colon cancer, chronic migraines, insomnia, HLD, STEFAN who presents for coronary angiography today for definitive eval of CAD prior to cardiac surgery for removal " of presumed Left atrial myxoma.     Plan:   -[facility]   -Cardiac surgery follow up scheduled, patient scheduled for procedure 8/2/24  -c/w home medications  -recommend smoking cessation         I spent  minutes in the professional and overall care of this patient.      Esperanza Carrasco PA-C

## 2024-07-29 NOTE — SIGNIFICANT EVENT
Cath Lab Procedure Call Back  Procedure Date: __7/26/2024_____________   Procedure Performed:________[facility]____________  Physician:_____Dr Stefano__________  Spoke with:___patient__________________________  Date/Time Contacted: 1st attempt: _________ ___:____ 2nd attempt: _________ ___:____  Phone#:_[phone]______________ Unable to reach/Left message:____________  Access Site: Pain__no____Bleeding__no____Bruised__no____Infection_no_____WNL__yes____  Dressing Removal Date:__7/27/24____________ Anticoagulation Post Intervention_________  Satisfied with Care:__yes______________ D/C Instructions adequate___yes____________  Follow Up Appointment:_____________________ Length of Call:__________________  Comments:_rash on wrist from tape_____________________________________________________________________________________________________________________________________________

## 2024-07-31 ENCOUNTER — HOSPITAL ENCOUNTER (OUTPATIENT)
Dept: RADIOLOGY | Facility: HOSPITAL | Age: 64
Discharge: HOME | End: 2024-07-31
Payer: COMMERCIAL

## 2024-07-31 ENCOUNTER — PRE-ADMISSION TESTING (OUTPATIENT)
Dept: PREADMISSION TESTING | Facility: HOSPITAL | Age: 64
DRG: 229 | End: 2024-07-31
Payer: COMMERCIAL

## 2024-07-31 VITALS
HEART RATE: 69 BPM | HEIGHT: 68 IN | TEMPERATURE: 98.1 F | SYSTOLIC BLOOD PRESSURE: 98 MMHG | WEIGHT: 148.8 LBS | OXYGEN SATURATION: 97 % | DIASTOLIC BLOOD PRESSURE: 63 MMHG | BODY MASS INDEX: 22.55 KG/M2

## 2024-07-31 DIAGNOSIS — R07.9 CHEST PAIN, UNSPECIFIED TYPE: ICD-10-CM

## 2024-07-31 DIAGNOSIS — D15.1 MYXOMA OF HEART: ICD-10-CM

## 2024-07-31 DIAGNOSIS — Z01.818 PREOPERATIVE EXAMINATION: Primary | ICD-10-CM

## 2024-07-31 DIAGNOSIS — D15.1 MYXOMA OF HEART (HHS-HCC): ICD-10-CM

## 2024-07-31 LAB
ABO GROUP (TYPE) IN BLOOD: NORMAL
ALBUMIN SERPL BCP-MCNC: 3.9 G/DL (ref 3.4–5)
ALP SERPL-CCNC: 63 U/L (ref 33–136)
ALT SERPL W P-5'-P-CCNC: 15 U/L (ref 7–45)
ANION GAP SERPL CALC-SCNC: 15 MMOL/L (ref 10–20)
ANTIBODY SCREEN: NORMAL
APPEARANCE UR: CLEAR
APTT PPP: 31 SECONDS (ref 27–38)
AST SERPL W P-5'-P-CCNC: 20 U/L (ref 9–39)
BASOPHILS # BLD AUTO: 0.04 X10*3/UL (ref 0–0.1)
BASOPHILS NFR BLD AUTO: 0.6 %
BILIRUB SERPL-MCNC: 0.4 MG/DL (ref 0–1.2)
BILIRUB UR STRIP.AUTO-MCNC: NEGATIVE MG/DL
BUN SERPL-MCNC: 17 MG/DL (ref 6–23)
CALCIUM SERPL-MCNC: 9.5 MG/DL (ref 8.6–10.6)
CHLORIDE SERPL-SCNC: 103 MMOL/L (ref 98–107)
CO2 SERPL-SCNC: 25 MMOL/L (ref 21–32)
COLOR UR: NORMAL
CREAT SERPL-MCNC: 0.61 MG/DL (ref 0.5–1.05)
CRP SERPL-MCNC: 2.55 MG/DL
EGFRCR SERPLBLD CKD-EPI 2021: >90 ML/MIN/1.73M*2
EOSINOPHIL # BLD AUTO: 0.07 X10*3/UL (ref 0–0.7)
EOSINOPHIL NFR BLD AUTO: 1 %
ERYTHROCYTE [DISTWIDTH] IN BLOOD BY AUTOMATED COUNT: 13.7 % (ref 11.5–14.5)
EST. AVERAGE GLUCOSE BLD GHB EST-MCNC: 94 MG/DL
GLUCOSE SERPL-MCNC: 73 MG/DL (ref 74–99)
GLUCOSE UR STRIP.AUTO-MCNC: NORMAL MG/DL
HBA1C MFR BLD: 4.9 %
HCT VFR BLD AUTO: 36.3 % (ref 36–46)
HGB BLD-MCNC: 12.2 G/DL (ref 12–16)
IMM GRANULOCYTES # BLD AUTO: 0.03 X10*3/UL (ref 0–0.7)
IMM GRANULOCYTES NFR BLD AUTO: 0.4 % (ref 0–0.9)
INR PPP: 1 (ref 0.9–1.1)
KETONES UR STRIP.AUTO-MCNC: NEGATIVE MG/DL
LEUKOCYTE ESTERASE UR QL STRIP.AUTO: NEGATIVE
LYMPHOCYTES # BLD AUTO: 1.66 X10*3/UL (ref 1.2–4.8)
LYMPHOCYTES NFR BLD AUTO: 24.8 %
MCH RBC QN AUTO: 31.4 PG (ref 26–34)
MCHC RBC AUTO-ENTMCNC: 33.6 G/DL (ref 32–36)
MCV RBC AUTO: 93 FL (ref 80–100)
MONOCYTES # BLD AUTO: 0.77 X10*3/UL (ref 0.1–1)
MONOCYTES NFR BLD AUTO: 11.5 %
NEUTROPHILS # BLD AUTO: 4.12 X10*3/UL (ref 1.2–7.7)
NEUTROPHILS NFR BLD AUTO: 61.7 %
NITRITE UR QL STRIP.AUTO: NEGATIVE
NRBC BLD-RTO: 0 /100 WBCS (ref 0–0)
PH UR STRIP.AUTO: 6 [PH]
PLATELET # BLD AUTO: 217 X10*3/UL (ref 150–450)
POTASSIUM SERPL-SCNC: 4.4 MMOL/L (ref 3.5–5.3)
PROT SERPL-MCNC: 6.9 G/DL (ref 6.4–8.2)
PROT UR STRIP.AUTO-MCNC: NEGATIVE MG/DL
PROTHROMBIN TIME: 11.8 SECONDS (ref 9.8–12.8)
RBC # BLD AUTO: 3.89 X10*6/UL (ref 4–5.2)
RBC # UR STRIP.AUTO: NEGATIVE /UL
RH FACTOR (ANTIGEN D): NORMAL
SODIUM SERPL-SCNC: 139 MMOL/L (ref 136–145)
SP GR UR STRIP.AUTO: 1.01
UROBILINOGEN UR STRIP.AUTO-MCNC: NORMAL MG/DL
WBC # BLD AUTO: 6.7 X10*3/UL (ref 4.4–11.3)

## 2024-07-31 PROCEDURE — 71046 X-RAY EXAM CHEST 2 VIEWS: CPT

## 2024-07-31 PROCEDURE — 86140 C-REACTIVE PROTEIN: CPT

## 2024-07-31 PROCEDURE — 80053 COMPREHEN METABOLIC PANEL: CPT

## 2024-07-31 PROCEDURE — 85025 COMPLETE CBC W/AUTO DIFF WBC: CPT

## 2024-07-31 PROCEDURE — 86901 BLOOD TYPING SEROLOGIC RH(D): CPT

## 2024-07-31 PROCEDURE — 87081 CULTURE SCREEN ONLY: CPT

## 2024-07-31 PROCEDURE — 85610 PROTHROMBIN TIME: CPT

## 2024-07-31 PROCEDURE — 83036 HEMOGLOBIN GLYCOSYLATED A1C: CPT

## 2024-07-31 PROCEDURE — 99204 OFFICE O/P NEW MOD 45 MIN: CPT | Performed by: NURSE PRACTITIONER

## 2024-07-31 PROCEDURE — 86923 COMPATIBILITY TEST ELECTRIC: CPT

## 2024-07-31 PROCEDURE — 36415 COLL VENOUS BLD VENIPUNCTURE: CPT

## 2024-07-31 PROCEDURE — 81003 URINALYSIS AUTO W/O SCOPE: CPT

## 2024-07-31 RX ORDER — CHLORHEXIDINE GLUCONATE 40 MG/ML
SOLUTION TOPICAL 2 TIMES DAILY
Qty: 473 ML | Refills: 0 | Status: SHIPPED | OUTPATIENT
Start: 2024-07-31 | End: 2024-08-08 | Stop reason: HOSPADM

## 2024-07-31 RX ORDER — CHLORHEXIDINE GLUCONATE ORAL RINSE 1.2 MG/ML
SOLUTION DENTAL
Qty: 473 ML | Refills: 0 | Status: SHIPPED | OUTPATIENT
Start: 2024-07-31 | End: 2024-08-08 | Stop reason: HOSPADM

## 2024-07-31 ASSESSMENT — ENCOUNTER SYMPTOMS
GASTROINTESTINAL NEGATIVE: 1
MUSCULOSKELETAL NEGATIVE: 1
CONSTITUTIONAL NEGATIVE: 1
ENDOCRINE NEGATIVE: 1
EYES NEGATIVE: 1
RESPIRATORY NEGATIVE: 1
NECK NEGATIVE: 1
CARDIOVASCULAR NEGATIVE: 1
NEUROLOGICAL NEGATIVE: 1

## 2024-07-31 ASSESSMENT — DUKE ACTIVITY SCORE INDEX (DASI)
CAN YOU DO HEAVY WORK AROUND THE HOUSE LIKE SCRUBBING FLOORS OR LIFTING AND MOVING HEAVY FURNITURE: YES
CAN YOU RUN A SHORT DISTANCE: YES
CAN YOU PARTICIPATE IN STRENOUS SPORTS LIKE SWIMMING, SINGLES TENNIS, FOOTBALL, BASKETBALL, OR SKIING: YES
CAN YOU WALK INDOORS, SUCH AS AROUND YOUR HOUSE: YES
DASI METS SCORE: 9.9
TOTAL_SCORE: 58.2
CAN YOU TAKE CARE OF YOURSELF (EAT, DRESS, BATHE, OR USE TOILET): YES
CAN YOU HAVE SEXUAL RELATIONS: YES
CAN YOU CLIMB A FLIGHT OF STAIRS OR WALK UP A HILL: YES
CAN YOU DO LIGHT WORK AROUND THE HOUSE LIKE DUSTING OR WASHING DISHES: YES
CAN YOU DO MODERATE WORK AROUND THE HOUSE LIKE VACUUMING, SWEEPING FLOORS OR CARRYING GROCERIES: YES
CAN YOU DO YARD WORK LIKE RAKING LEAVES, WEEDING OR PUSHING A MOWER: YES
CAN YOU PARTICIPATE IN MODERATE RECREATIONAL ACTIVITIES LIKE GOLF, BOWLING, DANCING, DOUBLES TENNIS OR THROWING A BASEBALL OR FOOTBALL: YES
CAN YOU WALK A BLOCK OR TWO ON LEVEL GROUND: YES

## 2024-07-31 ASSESSMENT — LIFESTYLE VARIABLES: SMOKING_STATUS: NONSMOKER

## 2024-07-31 NOTE — CPM/PAT H&P
CPM/PAT Evaluation       Name: Maribel Lakhani (Maribel Lakhani)  /Age: 1960/64 y.o.     Visit Type:   In-Person       Chief Complaint: Myxoma scheduled for surgery    HPI: Patient is a 64-year-old female scheduled for minimally invasive myxoma excision on 2024 for treatment of myxoma.  Patient is referred by Dr. Rosaura Morales for preoperative evaluation of anxiety, depression, newly diagnosed colon cancer not yet under treatment,myxoma,, osteoarthritis, recent cessation of tobacco abuse admitted 2024.    Past Medical History:   Diagnosis Date    Abnormal finding on mammography 2023    Adhesive capsulitis of left shoulder 2019    Adhesive capsulitis of left shoulder    Adhesive capsulitis of right shoulder 2015    Adhesive capsulitis of right shoulder    Anxiety and depression 2023    # Hx Decreased moods c/w major depression and increased anxiety -- settled down.  stable off all SSRIs.    Bitten by cat, initial encounter 2015    Cat bite    Colon cancer (Multi)     Dry skin dermatitis 2023    Fatigue 2023    Hemangioma of skin and subcutaneous tissue 2022    Hemorrhoidal skin tag 2023    Hyperlipidemia     Melanocytic nevi of trunk 2022    Melanocytic nevi of unspecified lower limb, including hip 2022    Melanocytic nevi of unspecified part of face 2022    Melanocytic nevi of unspecified upper limb, including shoulder 2022    Neoplasm of uncertain behavior of skin 2022    Other conditions influencing health status     Menstruation    Other melanin hyperpigmentation 2022    Other seborrheic keratosis 2022    Other shoulder lesions, unspecified shoulder 2015    Rotator cuff tendonitis    Other viral warts 2022    Pain in left wrist 2021    Left wrist pain    Pain in right shoulder 2016    Right shoulder pain    Personal history of other complications of pregnancy, childbirth and the  puerperium     History of spontaneous     Personal history of other diseases of the circulatory system 2014    History of varicose veins    Personal history of other diseases of the female genital tract     Vaginal delivery    Personal history of other infectious and parasitic diseases     History of varicella    Personal history of other specified conditions 2014    History of urinary urgency    Petechiae 2023    Primary focal hyperhidrosis, soles 2022    Rash and other nonspecific skin eruption 2022    Scar condition and fibrosis of skin 2022    Seborrheic dermatitis 2023    Skin neoplasm 2023    Tendinitis of left rotator cuff 2023    # hx Left Rotator cuff tendinitis -- resolved with HEP and csi 18. Passive rom exercises demonstrated. No longer using tramadol.    Unspecified disorder of nose and nasal sinuses 2014    Sinus problem    Unspecified visual loss 2014    Vision problems    Xerosis cutis 2022       Past Surgical History:   Procedure Laterality Date    CARDIAC CATHETERIZATION N/A 2024    Procedure: Left Heart Cath;  Surgeon: Dariel Dumont MD;  Location: [facility];  Service: Cardiovascular;  Laterality: N/A;    DILATION AND CURETTAGE OF UTERUS  2014    Dilation And Curettage    FOOT SURGERY  2014    Foot Surgery    OTHER SURGICAL HISTORY  2014    Oral Surgery       Patient  reports that she is not currently sexually active.    Family History   Problem Relation Name Age of Onset    Lupus Mother      Sjogren's syndrome Mother      Fibromyalgia Mother      Cancer Father JASON Mercado     Hearing loss Father JASON Mercado     Cancer Maternal Grandmother Catia Santa        Allergies   Allergen Reactions    Adhesive Tape-Silicones Itching    Ampicillin Rash    Cefdinir Rash    [id number] Antimigraine Agents Anxiety       Prior to Admission medications    Medication Sig  Start Date End Date Taking? Authorizing Provider   ezetimibe (Zetia) 10 mg tablet Take 1 tablet (10 mg) by mouth once daily. 2/20/24  Yes Albert Berry MD   traZODone (Desyrel) 100 mg tablet Take 1 tablet (100 mg) by mouth once daily. 2/20/24 2/19/25 Yes Albert Berry MD   venlafaxine XR (Effexor-XR) 37.5 mg 24 hr capsule Take 1 capsule (37.5 mg) by mouth once daily. Do not crush or chew. 5/1/24 5/1/25 Yes Albert Berry MD   chlorhexidine (Hibiclens) 4 % external liquid Apply topically 2 times a day for 5 days. 7/31/24 8/5/24  Samantha A Meeson, APRN-CNP   chlorhexidine (Peridex) 0.12 % solution Swish and spit 15 mL night before surgery and morning of surgery 7/31/24   Samantha A Meeson, APRN-CNP   diclofenac (Voltaren) 75 mg EC tablet Take 1 tablet (75 mg) by mouth 2 times a day as needed (pain). Do not crush, chew, or split. 8/29/23 8/28/24  Albert Berry MD   mv-mn/folic ac/calcium/vit K1 (WOMEN'S 50 PLUS MULTIVITAMIN ORAL) Take 1 tablet by mouth once daily.    Historical Provider, MD CARUSO ROS:   Constitutional:   neg    Neuro/Psych:   neg    Eyes:   neg     use of corrective lenses  Ears:   neg    Nose:   neg    Mouth:   neg    Throat:   neg    Neck:   neg    Cardio:   neg    Respiratory:   neg    Endocrine:   neg    GI:   neg    :   neg    Musculoskeletal:   neg    Hematologic:   neg    Skin:  neg        Physical Exam  Vitals reviewed.   Constitutional:       Appearance: Normal appearance.   HENT:      Head: Normocephalic.      Mouth/Throat:      Mouth: Mucous membranes are moist.   Eyes:      Conjunctiva/sclera: Conjunctivae normal.   Neck:      Vascular: No carotid bruit.   Cardiovascular:      Rate and Rhythm: Normal rate and regular rhythm.      Pulses: Normal pulses.      Heart sounds: Normal heart sounds.   Pulmonary:      Effort: Pulmonary effort is normal.      Breath sounds: Normal breath sounds.   Abdominal:      Palpations: Abdomen is soft.      Tenderness: There is no  abdominal tenderness.   Musculoskeletal:         General: Normal range of motion.      Cervical back: Normal range of motion.      Right lower leg: No edema.      Left lower leg: No edema.   Lymphadenopathy:      Cervical: No cervical adenopathy.   Skin:     General: Skin is warm and dry.      Capillary Refill: Capillary refill takes less than 2 seconds.   Neurological:      General: No focal deficit present.      Mental Status: She is alert and oriented to person, place, and time.   Psychiatric:         Mood and Affect: Mood normal.         Behavior: Behavior normal.         Thought Content: Thought content normal.         Judgment: Judgment normal.          PAT AIRWAY:   Airway:     Mallampati::  III    Neck ROM::  Full  normal        Visit Vitals  BP 98/63   Pulse 69   Temp 36.7 °C (98.1 °F)       DASI Risk Score      Flowsheet Row Most Recent Value   DASI SCORE 58.2   METS Score (Will be calculated only when all the questions are answered) 9.9          Caprini DVT Assessment      Flowsheet Row Most Recent Value   DVT Score 6   Current Status Major surgery planned, lasting 2-3 hours   Age 60-75 years   BMI 30 or less          Modified Frailty Index      Flowsheet Row Most Recent Value   Modified Frailty Index Calculator 0          CHADS2 Stroke Risk  Current as of 7 minutes ago        N/A 3 to 100%: High Risk   2 to < 3%: Medium Risk   0 to < 2%: Low Risk     Last Change: N/A          This score determines the patient's risk of having a stroke if the patient has atrial fibrillation.        This score is not applicable to this patient. Components are not calculated.          Revised Cardiac Risk Index      Flowsheet Row Most Recent Value   Revised Cardiac Risk Calculator 1          Apfel Simplified Score      Flowsheet Row Most Recent Value   Apfel Simplified Score Calculator 3          Risk Analysis Index Results This Encounter    No data found in the last 1 encounters.       Stop Bang Score      Flowsheet Row  Most Recent Value   Do you snore loudly? 0   Do you often feel tired or fatigued after your sleep? 0   Has anyone ever observed you stop breathing in your sleep? 0   Do you have or are you being treated for high blood pressure? 0   Recent BMI (Calculated) 22.6   Is BMI greater than 35 kg/m2? 0=No   Age older than 50 years old? 1=Yes   Is your neck circumference greater than 17 inches (Male) or 16 inches (Female)? 1   Gender - Male 0=No   STOP-BANG Total Score 2              Assessment and Plan:   Neuro:  anxiety and depression managed on venlafaxine (continue). Insomnia managed on trazodone (continue).     The patient is at an increased risk for post operative delirium secondary to depression and type and duration of surgery.  Preoperative brain exercise educational handout provided to patient.    The patient is at an increased risk for perioperative stroke secondary to cardiac disease, female sex , cardiac surgery, and general anesthesia.     HEENT/Airway:  No diagnosis or significant findings on chart review or clinical presentation and evaluation.     Cardiovascular:  myxoma (found incidentally on colon cancer work up imaging) scheduled for surgery.  Transthoracic echocardiogram 07/15/2024 shows normal left ventricular systolic function with EF 65%, moderate to severe mitral valve stenosis, mild mitral valve regurgitation, large mobile pediculated mass seen attached to the atrial aspect of the anterior leaflet of the mitral valve measuring 6.3 x 2.4 cm resulting in moderate to severe obstruction of the mitral valve flow with mild mitral regurgitation consistent with left atrial myxoma.  Cardiac cath 07/26/2024, awaiting final report.  No additional preoperative testing is currently indicated.    METS are 9.9    RCRI  1 which is 6% 30 day risk of MACE (risk for cardiac death, nonfatal myocardial infarction, and nonfactal cardiac arrest    ENRRIQUE score which indicates a 0.2% risk of intraoperative or 30-day  postoperative MACE      Pulmonary:  recent cessation of tobacco abuse admitted 2024..  Preoperative deep breathing educational handout provided to patient.    ARISCAT:  43    points which is a intermediate (13.3%) risk of in-hospital post-op pulmonary complications     PRODIGY:  8  points which is a intermediate risk of post op opioid induced respiratory depression episodes    STOP BAN  points which is a low risk for moderate to severe TAMMY    Renal: No diagnosis or significant findings on chart review or clinical presentation and evaluation, however, the patient is at increased risk of perioperative renal complications secondary to age>/= 56. Preventative measures include preoperative hydration.    DPS 2 points medium risk for perioperative acute kidney injury    Endocrine:  No diagnosis or significant findings on chart review or clinical presentation and evaluation.     Hematologic:  No diagnosis or significant findings on chart review or clinical presentation and evaluation however see below risk screening tool.  Preoperative DVT educational handout provided to patient.    Caprini Score:  6  points which is a highest risk of perioperative VTE    Gastrointestinal:   newly diagnosed colon cancer not yet under treatment.    EAT-10 score of  0 - self-perceived oropharyngeal dysphagia scale (0-40)     Apfel: 3 points 61% risk for post operative N/V    Infectious disease:  No diagnosis or significant findings on chart review or clinical presentation and evaluation.      Musculoskeletal: Osteoarthritis managed on diclofenac (holding 7 days).        Labs ordered  Results for orders placed or performed in visit on 24 (from the past 96 hour(s))   Comprehensive Metabolic Panel   Result Value Ref Range    Glucose 73 (L) 74 - 99 mg/dL    Sodium 139 136 - 145 mmol/L    Potassium 4.4 3.5 - 5.3 mmol/L    Chloride 103 98 - 107 mmol/L    Bicarbonate 25 21 - 32 mmol/L    Anion Gap 15 10 - 20 mmol/L    Urea Nitrogen  17 6 - 23 mg/dL    Creatinine 0.61 0.50 - 1.05 mg/dL    eGFR >90 >60 mL/min/1.73m*2    Calcium 9.5 8.6 - 10.6 mg/dL    Albumin 3.9 3.4 - 5.0 g/dL    Alkaline Phosphatase 63 33 - 136 U/L    Total Protein 6.9 6.4 - 8.2 g/dL    AST 20 9 - 39 U/L    Bilirubin, Total 0.4 0.0 - 1.2 mg/dL    ALT 15 7 - 45 U/L   C-Reactive Protein   Result Value Ref Range    C-Reactive Protein 2.55 (H) <1.00 mg/dL   CBC and Auto Differential   Result Value Ref Range    WBC 6.7 4.4 - 11.3 x10*3/uL    nRBC 0.0 0.0 - 0.0 /100 WBCs    RBC 3.89 (L) 4.00 - 5.20 x10*6/uL    Hemoglobin 12.2 12.0 - 16.0 g/dL    Hematocrit 36.3 36.0 - 46.0 %    MCV 93 80 - 100 fL    MCH 31.4 26.0 - 34.0 pg    MCHC 33.6 32.0 - 36.0 g/dL    RDW 13.7 11.5 - 14.5 %    Platelets 217 150 - 450 x10*3/uL    Neutrophils % 61.7 40.0 - 80.0 %    Immature Granulocytes %, Automated 0.4 0.0 - 0.9 %    Lymphocytes % 24.8 13.0 - 44.0 %    Monocytes % 11.5 2.0 - 10.0 %    Eosinophils % 1.0 0.0 - 6.0 %    Basophils % 0.6 0.0 - 2.0 %    Neutrophils Absolute 4.12 1.20 - 7.70 x10*3/uL    Immature Granulocytes Absolute, Automated 0.03 0.00 - 0.70 x10*3/uL    Lymphocytes Absolute 1.66 1.20 - 4.80 x10*3/uL    Monocytes Absolute 0.77 0.10 - 1.00 x10*3/uL    Eosinophils Absolute 0.07 0.00 - 0.70 x10*3/uL    Basophils Absolute 0.04 0.00 - 0.10 x10*3/uL   Coagulation Screen   Result Value Ref Range    Protime 11.8 9.8 - 12.8 seconds    INR 1.0 0.9 - 1.1    aPTT 31 27 - 38 seconds   Type And Screen   Result Value Ref Range    ABO TYPE A     Rh TYPE POS     ANTIBODY SCREEN NEG    Hemoglobin A1C   Result Value Ref Range    Hemoglobin A1C 4.9 see below %    Estimated Average Glucose 94 Not Established mg/dL   Urinalysis with Reflex Culture and Microscopic   Result Value Ref Range    Color, Urine Light-Yellow Light-Yellow, Yellow, Dark-Yellow    Appearance, Urine Clear Clear    Specific Gravity, Urine 1.009 1.005 - 1.035    pH, Urine 6.0 5.0, 5.5, 6.0, 6.5, 7.0, 7.5, 8.0    Protein, Urine NEGATIVE  NEGATIVE, 10 (TRACE), 20 (TRACE) mg/dL    Glucose, Urine Normal Normal mg/dL    Blood, Urine NEGATIVE NEGATIVE    Ketones, Urine NEGATIVE NEGATIVE mg/dL    Bilirubin, Urine NEGATIVE NEGATIVE    Urobilinogen, Urine Normal Normal mg/dL    Nitrite, Urine NEGATIVE NEGATIVE    Leukocyte Esterase, Urine NEGATIVE NEGATIVE   Extra Urine Gray Tube   Result Value Ref Range    Extra Tube Hold for add-ons.         === 07/31/24 ===    XR CHEST 2 VIEWS    - Impression -  1.  No evidence of acute cardiopulmonary process.      MRSA/MSSA culture 07/31/24 pending please review day of surgery- no iodine allergy.

## 2024-07-31 NOTE — PREPROCEDURE INSTRUCTIONS
Thank you for visiting [facility] ([facility]) today for your pre-procedure evaluation, you were seen by     Samantha Meeson, MSN, NP-C  Adult-Gerontology Nurse Practitioner II  Department of Anesthesiology and Perioperative Medicine  Main phone [phone]  Direct phone [phone]  Fax [phone]     This summary includes instructions and information to aid you during your perioperative period.  Please read carefully. If you have any questions about your visit today, please call the number listed above.  If you become ill or have any changes to your health before your surgery, please contact your primary care provider and alert your surgeon.    Preparing for your Surgery       Exercises  Preoperative Deep Breathing Exercises  Why it is important to do deep breathing exercises before my surgery?  Deep breathing exercises strengthen your breathing muscles.  This helps you to recover after your surgery and decreases the chance of breathing complications.  How are the deep breathing exercises done?  Sit straight with your back supported.  Breathe in deeply and slowly through your nose. Your lower rib cage should expand and your abdomen may move forward.  Hold that breath for 3 to 5 seconds.  Breathe out through pursed lips, slowly and completely.  Rest and repeat 10 times every hour while awake.  Rest longer if you become dizzy or lightheaded.       Incentive Spirometer   You were provided with an incentive spirometer in [facility], please follow the below instructions.   You were not provided an incentive spirometer in [facility], please disregard the incentive spirometer instructions  What is an incentive spirometer?  An incentive spirometer is a device used before and after surgery to “exercise” your lungs.  It helps you to take deeper breaths to expand your lungs.  Below is an example of a basic incentive spirometer.  The device you receive may differ slightly but they all function the  same.    Why do I need to use an incentive spirometer?  Using your incentive spirometer prepares your lungs for surgery and helps prevent lung problems after surgery.  How do I use my incentive spirometer?  When you're using your incentive spirometer, make sure to breathe through your mouth. If you breathe through your nose, the incentive spirometer won't work properly. You can hold your nose if you have trouble.  If you feel dizzy at any time, stop and rest. Try again at a later time.  Follow the steps below:  Set up your incentive spirometer, expand the flexible tubing and connect to the outlet.  Sit upright in a chair or bed. Hold the incentive spirometer at eye level.   Put the mouthpiece in your mouth and close your lips tightly around it. Slowly breathe out (exhale) completely.  Breathe in (inhale) slowly through your mouth as deeply as you can. As you take a breath, you will see the piston rise inside the large column. While the piston rises, the indicator should move upwards. It should stay in between the 2 arrows (see Figure).  Try to get the piston as high as you can, while keeping the indicator between the arrows.   If the indicator doesn't stay between the arrows, you're breathing either too fast or too slow.  When you get it as high as you can, hold your breath for 10 seconds, or as long as possible. While you're holding your breath, the piston will slowly fall to the base of the spirometer.  Once the piston reaches the bottom of the spirometer, breathe out slowly through your mouth. Rest for a few seconds.  Repeat 10 times. Try to get the piston to the same level with each breath.  Repeat every hour while awake  You can carefully clean the outside of the mouthpiece with an alcohol wipe or soap and water.      Preoperative Brain Exercises    What are brain exercises?  A brain exercise is any activity that engages your thinking (cognitive) skills.    What types of activities are considered brain  exercises?  Jigsaw puzzles, crossword puzzles, word jumble, memory games, word search, and many more.  Many can be found free online or on your phone via a mobile amparo.    Why should I do brain exercises before my surgery?  More recent research has shown brain exercise before surgery can lower the risk of postoperative delirium (confusion) which can be especially important for older adults.  Patients who did brain exercises for 5 to 10 hours the days before surgery, cut their risk of postoperative delirium in half up to 1 week after surgery.    Sit-to-Stand Exercise    What is the sit-to-stand exercise?  The sit-to-stand exercise strengthens the muscles of your lower body and muscles in the center of your body (core muscles for stability) helping to maintain and improve your strength and mobility.  How do I do the sit-to-stand exercise?  The goal is to do this exercise without using your arms or hands.  If this is too difficult, use your arms and hands or a chair with armrests to help slowly push yourself to the standing position and lower yourself back to the sitting position. As the movement becomes easier use your arms and hands less.    Steps to the sit-to-stand exercise  Sit up tall in a sturdy chair, knees bent, feet flat on the floor shoulder-width apart.  Shift your hips/pelvis forward in the chair to correctly position yourself for the next movement.  Lean forward at your hips.  Stand up straight putting equal weight on both feet.  Check to be sure you are properly aligned with the chair, in a slow controlled movement sit back down.  Repeat this exercise 10-15 times.  If needed you can do it fewer times until your strength improves.  Rest for 1 minute.  Do another 10-15 sit-to-stand exercises.  Try to do this in the morning and evening.        Instructions    Preoperative Fasting Guidelines    Why must I stop eating and drinking near surgery time?  With sedation, food or liquid in your stomach can enter your  lungs causing serious complications  Food can increase nausea and vomiting  When do I need to stop eating and drinking before my surgery?      Do not eat any food after midnight the night before your surgery/procedure. You may have up to 13.5 ounces of clear liquid until TWO hours before your instructed arrival time to the hospital.  This includes water, black tea/coffee, (no milk or cream) apple juice, and electrolyte drinks (Gatorade). You may chew gum until TWO hours before your surgery/procedure            Simple things you can do to help prevent blood clots     Blood clots are blockages that can form in the body's veins. When a blood clot forms in your deep veins, it may be called a deep vein thrombosis, or DVT for short. Blood clots can happen in any part of the body where blood flows, but they are most common in the arms and legs. If a piece of a blood clot breaks free and travels to the lungs, it is called a pulmonary embolus (PE). A PE can be a very serious problem.         Being in the hospital or having surgery can raise your chances of getting a blood clot because you may not be well enough to move around as much as you normally do.         Ways you can help prevent blood clots in the hospital       Wearing SCDs  SCDs stands for Sequential Compression Devices.   SCDs are special sleeves that wrap around your legs. They attach to a pump that fills them with air to gently squeeze your legs every few minutes.  This helps return the blood in your legs to your heart.   SCDs should only be taken off when walking or bathing. SCDs may not be comfortable, but they can help save your life.              Pump SCD leg sleeves  Wearing compression stockings - if your doctor orders them. These special snug-fitting stockings gently squeeze your legs to help blood flow.       Walking. Walking helps move the blood in your legs.   If your doctor says it is ok, try walking the halls at least   5 times a day. Ask us to help  you get up, so you don't fall.      Taking any blood-thinning medicines your doctor orders.              Ways you can help prevent blood clots at home         Wearing compression stockings - if your doctor orders them.   Walking - to help move the blood in your legs.    Taking any blood-thinning medicines your doctor orders.      Signs of a blood clot or PE    Tell your doctor or nurse right away if you have any of the problems listed below.         If you are at home, seek medical care right away. Call 911 for chest pain or problems breathing.            Signs of a blood clot (DVT) - such as pain, swelling, redness, or warmth in your arm or legs.  Signs of a pulmonary embolism (PE) - such as chest pain or feeling short of breath      Tobacco and Alcohol;  Do not drink alcohol or smoke within 24 hours of surgery.  It is best to quit smoking for as long as possible before any surgery or procedure.        The Week before Surgery        Seven days before Surgery  Check your [facility] medication instructions  Do the exercises provided to you by [facility]   Arrange for a responsible, adult licensed  to take you home after surgery and stay with you for 24 hours.  You will not be permitted to drive yourself home if you have received any anesthetic/sedation  Six days before surgery  Check your [facility] medication instructions  Do the exercises provided to you by [facility]   Start using Chlorhexidene (CHG) body wash if prescribed  Five days before surgery  Check your [facility] medication instructions  Do the exercises provided to you by [facility]   Continue to use CHG body wash if prescribed  Three days before surgery  Check your [facility] medication instructions  Do the exercises provided to you by [facility]   Continue to use CHG body wash if prescribed  Two days before surgery  Check your [facility] medication instructions  Do the exercises provided to you by [facility]   Continue to use CHG body wash if prescribed    The Day before Surgery       Check your [facility] medication and  all other [facility] instructions including when to stop eating and drinking  You will be called with your arrival time for surgery in the late afternoon.  If you do not receive a call please reach out to your surgeon's office.  Do not smoke or drink 24 hours before surgery  Prepare items to bring with you to the hospital  Shower with your chlorhexidine wash if prescribed  Brush your teeth and use your chlorhexidine dental rinse if prescribed    The Day of Surgery       Check your [facility] medication instructions  Ensure you follow the instructions for when to stop eating and drinking  Shower, if prescribed use CHG.  Do not apply any lotions, creams, moisturizers, perfume or deodorant  Brush your teeth and use your CHG dental rinse if prescribed  Wear loose comfortable clothing  Avoid make-up  Remove  jewelry and piercings, consider professional piercing removal with a plastic spacer if needed  Bring photo ID and Insurance card  Bring an accurate medication list that includes medication dose, frequency and allergies  Bring a copy of your advanced directives (will, health care power of )  Bring any devices and controllers as well as medical devices you have been provided with for surgery (CPAP, slings, braces, etc.)  Dentures, eyeglasses, and contacts will be removed before surgery, please bring cases for contacts or glasses

## 2024-07-31 NOTE — H&P (VIEW-ONLY)
CPM/PAT Evaluation       Name: Maribel Lakhani (Maribel Lakhani)  /Age: 1960/64 y.o.     Visit Type:   In-Person       Chief Complaint: Myxoma scheduled for surgery    HPI: Patient is a 64-year-old female scheduled for minimally invasive myxoma excision on 2024 for treatment of myxoma.  Patient is referred by Dr. Rosaura Morales for preoperative evaluation of anxiety, depression, newly diagnosed colon cancer not yet under treatment,myxoma,, osteoarthritis, recent cessation of tobacco abuse admitted 2024.    Past Medical History:   Diagnosis Date    Abnormal finding on mammography 2023    Adhesive capsulitis of left shoulder 2019    Adhesive capsulitis of left shoulder    Adhesive capsulitis of right shoulder 2015    Adhesive capsulitis of right shoulder    Anxiety and depression 2023    # Hx Decreased moods c/w major depression and increased anxiety -- settled down.  stable off all SSRIs.    Bitten by cat, initial encounter 2015    Cat bite    Colon cancer (Multi)     Dry skin dermatitis 2023    Fatigue 2023    Hemangioma of skin and subcutaneous tissue 2022    Hemorrhoidal skin tag 2023    Hyperlipidemia     Melanocytic nevi of trunk 2022    Melanocytic nevi of unspecified lower limb, including hip 2022    Melanocytic nevi of unspecified part of face 2022    Melanocytic nevi of unspecified upper limb, including shoulder 2022    Neoplasm of uncertain behavior of skin 2022    Other conditions influencing health status     Menstruation    Other melanin hyperpigmentation 2022    Other seborrheic keratosis 2022    Other shoulder lesions, unspecified shoulder 2015    Rotator cuff tendonitis    Other viral warts 2022    Pain in left wrist 2021    Left wrist pain    Pain in right shoulder 2016    Right shoulder pain    Personal history of other complications of pregnancy, childbirth and the  puerperium     History of spontaneous     Personal history of other diseases of the circulatory system 2014    History of varicose veins    Personal history of other diseases of the female genital tract     Vaginal delivery    Personal history of other infectious and parasitic diseases     History of varicella    Personal history of other specified conditions 2014    History of urinary urgency    Petechiae 2023    Primary focal hyperhidrosis, soles 2022    Rash and other nonspecific skin eruption 2022    Scar condition and fibrosis of skin 2022    Seborrheic dermatitis 2023    Skin neoplasm 2023    Tendinitis of left rotator cuff 2023    # hx Left Rotator cuff tendinitis -- resolved with HEP and csi 18. Passive rom exercises demonstrated. No longer using tramadol.    Unspecified disorder of nose and nasal sinuses 2014    Sinus problem    Unspecified visual loss 2014    Vision problems    Xerosis cutis 2022       Past Surgical History:   Procedure Laterality Date    CARDIAC CATHETERIZATION N/A 2024    Procedure: Left Heart Cath;  Surgeon: Dariel Dumont MD;  Location: [facility] Card[facility];  Service: Cardiovascular;  Laterality: N/A;    DILATION AND CURETTAGE OF UTERUS  2014    Dilation And Curettage    FOOT SURGERY  2014    Foot Surgery    OTHER SURGICAL HISTORY  2014    Oral Surgery       Patient  reports that she is not currently sexually active.    Family History   Problem Relation Name Age of Onset    Lupus Mother      Sjogren's syndrome Mother      Fibromyalgia Mother      Cancer Father JASON Mercado     Hearing loss Father JASON Mercado     Cancer Maternal Grandmother Catia Santa        Allergies   Allergen Reactions    Adhesive Tape-Silicones Itching    Ampicillin Rash    Cefdinir Rash    [id number] Antimigraine Agents Anxiety       Prior to Admission medications    Medication Sig  Start Date End Date Taking? Authorizing Provider   ezetimibe (Zetia) 10 mg tablet Take 1 tablet (10 mg) by mouth once daily. 2/20/24  Yes Albert Berry MD   traZODone (Desyrel) 100 mg tablet Take 1 tablet (100 mg) by mouth once daily. 2/20/24 2/19/25 Yes Albert Berry MD   venlafaxine XR (Effexor-XR) 37.5 mg 24 hr capsule Take 1 capsule (37.5 mg) by mouth once daily. Do not crush or chew. 5/1/24 5/1/25 Yes Albert Berry MD   chlorhexidine (Hibiclens) 4 % external liquid Apply topically 2 times a day for 5 days. 7/31/24 8/5/24  Samantha A Meeson, APRN-CNP   chlorhexidine (Peridex) 0.12 % solution Swish and spit 15 mL night before surgery and morning of surgery 7/31/24   Samantha A Meeson, APRN-CNP   diclofenac (Voltaren) 75 mg EC tablet Take 1 tablet (75 mg) by mouth 2 times a day as needed (pain). Do not crush, chew, or split. 8/29/23 8/28/24  Albert Berry MD   mv-mn/folic ac/calcium/vit K1 (WOMEN'S 50 PLUS MULTIVITAMIN ORAL) Take 1 tablet by mouth once daily.    Historical Provider, MD CARUSO ROS:   Constitutional:   neg    Neuro/Psych:   neg    Eyes:   neg     use of corrective lenses  Ears:   neg    Nose:   neg    Mouth:   neg    Throat:   neg    Neck:   neg    Cardio:   neg    Respiratory:   neg    Endocrine:   neg    GI:   neg    :   neg    Musculoskeletal:   neg    Hematologic:   neg    Skin:  neg        Physical Exam  Vitals reviewed.   Constitutional:       Appearance: Normal appearance.   HENT:      Head: Normocephalic.      Mouth/Throat:      Mouth: Mucous membranes are moist.   Eyes:      Conjunctiva/sclera: Conjunctivae normal.   Neck:      Vascular: No carotid bruit.   Cardiovascular:      Rate and Rhythm: Normal rate and regular rhythm.      Pulses: Normal pulses.      Heart sounds: Normal heart sounds.   Pulmonary:      Effort: Pulmonary effort is normal.      Breath sounds: Normal breath sounds.   Abdominal:      Palpations: Abdomen is soft.      Tenderness: There is no  abdominal tenderness.   Musculoskeletal:         General: Normal range of motion.      Cervical back: Normal range of motion.      Right lower leg: No edema.      Left lower leg: No edema.   Lymphadenopathy:      Cervical: No cervical adenopathy.   Skin:     General: Skin is warm and dry.      Capillary Refill: Capillary refill takes less than 2 seconds.   Neurological:      General: No focal deficit present.      Mental Status: She is alert and oriented to person, place, and time.   Psychiatric:         Mood and Affect: Mood normal.         Behavior: Behavior normal.         Thought Content: Thought content normal.         Judgment: Judgment normal.          PAT AIRWAY:   Airway:     Mallampati::  III    Neck ROM::  Full  normal        Visit Vitals  BP 98/63   Pulse 69   Temp 36.7 °C (98.1 °F)       DASI Risk Score      Flowsheet Row Most Recent Value   DASI SCORE 58.2   METS Score (Will be calculated only when all the questions are answered) 9.9          Caprini DVT Assessment      Flowsheet Row Most Recent Value   DVT Score 6   Current Status Major surgery planned, lasting 2-3 hours   Age 60-75 years   BMI 30 or less          Modified Frailty Index      Flowsheet Row Most Recent Value   Modified Frailty Index Calculator 0          CHADS2 Stroke Risk  Current as of 7 minutes ago        N/A 3 to 100%: High Risk   2 to < 3%: Medium Risk   0 to < 2%: Low Risk     Last Change: N/A          This score determines the patient's risk of having a stroke if the patient has atrial fibrillation.        This score is not applicable to this patient. Components are not calculated.          Revised Cardiac Risk Index      Flowsheet Row Most Recent Value   Revised Cardiac Risk Calculator 1          Apfel Simplified Score      Flowsheet Row Most Recent Value   Apfel Simplified Score Calculator 3          Risk Analysis Index Results This Encounter    No data found in the last 1 encounters.       Stop Bang Score      Flowsheet Row  Most Recent Value   Do you snore loudly? 0   Do you often feel tired or fatigued after your sleep? 0   Has anyone ever observed you stop breathing in your sleep? 0   Do you have or are you being treated for high blood pressure? 0   Recent BMI (Calculated) 22.6   Is BMI greater than 35 kg/m2? 0=No   Age older than 50 years old? 1=Yes   Is your neck circumference greater than 17 inches (Male) or 16 inches (Female)? 1   Gender - Male 0=No   STOP-BANG Total Score 2              Assessment and Plan:   Neuro:  anxiety and depression managed on venlafaxine (continue). Insomnia managed on trazodone (continue).     The patient is at an increased risk for post operative delirium secondary to depression and type and duration of surgery.  Preoperative brain exercise educational handout provided to patient.    The patient is at an increased risk for perioperative stroke secondary to cardiac disease, female sex , cardiac surgery, and general anesthesia.     HEENT/Airway:  No diagnosis or significant findings on chart review or clinical presentation and evaluation.     Cardiovascular:  myxoma (found incidentally on colon cancer work up imaging) scheduled for surgery.  Transthoracic echocardiogram 07/15/2024 shows normal left ventricular systolic function with EF 65%, moderate to severe mitral valve stenosis, mild mitral valve regurgitation, large mobile pediculated mass seen attached to the atrial aspect of the anterior leaflet of the mitral valve measuring 6.3 x 2.4 cm resulting in moderate to severe obstruction of the mitral valve flow with mild mitral regurgitation consistent with left atrial myxoma.  Cardiac cath 07/26/2024, awaiting final report.  No additional preoperative testing is currently indicated.    METS are 9.9    RCRI  1 which is 6% 30 day risk of MACE (risk for cardiac death, nonfatal myocardial infarction, and nonfactal cardiac arrest    ENRRIQUE score which indicates a 0.2% risk of intraoperative or 30-day  postoperative MACE      Pulmonary:  recent cessation of tobacco abuse admitted 2024..  Preoperative deep breathing educational handout provided to patient.    ARISCAT:  43    points which is a intermediate (13.3%) risk of in-hospital post-op pulmonary complications     PRODIGY:  8  points which is a intermediate risk of post op opioid induced respiratory depression episodes    STOP BAN  points which is a low risk for moderate to severe TAMMY    Renal: No diagnosis or significant findings on chart review or clinical presentation and evaluation, however, the patient is at increased risk of perioperative renal complications secondary to age>/= 56. Preventative measures include preoperative hydration.    DPS 2 points medium risk for perioperative acute kidney injury    Endocrine:  No diagnosis or significant findings on chart review or clinical presentation and evaluation.     Hematologic:  No diagnosis or significant findings on chart review or clinical presentation and evaluation however see below risk screening tool.  Preoperative DVT educational handout provided to patient.    Caprini Score:  6  points which is a highest risk of perioperative VTE    Gastrointestinal:   newly diagnosed colon cancer not yet under treatment.    EAT-10 score of  0 - self-perceived oropharyngeal dysphagia scale (0-40)     Apfel: 3 points 61% risk for post operative N/V    Infectious disease:  No diagnosis or significant findings on chart review or clinical presentation and evaluation.      Musculoskeletal: Osteoarthritis managed on diclofenac (holding 7 days).        Labs ordered  Results for orders placed or performed in visit on 24 (from the past 96 hour(s))   Comprehensive Metabolic Panel   Result Value Ref Range    Glucose 73 (L) 74 - 99 mg/dL    Sodium 139 136 - 145 mmol/L    Potassium 4.4 3.5 - 5.3 mmol/L    Chloride 103 98 - 107 mmol/L    Bicarbonate 25 21 - 32 mmol/L    Anion Gap 15 10 - 20 mmol/L    Urea Nitrogen  17 6 - 23 mg/dL    Creatinine 0.61 0.50 - 1.05 mg/dL    eGFR >90 >60 mL/min/1.73m*2    Calcium 9.5 8.6 - 10.6 mg/dL    Albumin 3.9 3.4 - 5.0 g/dL    Alkaline Phosphatase 63 33 - 136 U/L    Total Protein 6.9 6.4 - 8.2 g/dL    AST 20 9 - 39 U/L    Bilirubin, Total 0.4 0.0 - 1.2 mg/dL    ALT 15 7 - 45 U/L   C-Reactive Protein   Result Value Ref Range    C-Reactive Protein 2.55 (H) <1.00 mg/dL   CBC and Auto Differential   Result Value Ref Range    WBC 6.7 4.4 - 11.3 x10*3/uL    nRBC 0.0 0.0 - 0.0 /100 WBCs    RBC 3.89 (L) 4.00 - 5.20 x10*6/uL    Hemoglobin 12.2 12.0 - 16.0 g/dL    Hematocrit 36.3 36.0 - 46.0 %    MCV 93 80 - 100 fL    MCH 31.4 26.0 - 34.0 pg    MCHC 33.6 32.0 - 36.0 g/dL    RDW 13.7 11.5 - 14.5 %    Platelets 217 150 - 450 x10*3/uL    Neutrophils % 61.7 40.0 - 80.0 %    Immature Granulocytes %, Automated 0.4 0.0 - 0.9 %    Lymphocytes % 24.8 13.0 - 44.0 %    Monocytes % 11.5 2.0 - 10.0 %    Eosinophils % 1.0 0.0 - 6.0 %    Basophils % 0.6 0.0 - 2.0 %    Neutrophils Absolute 4.12 1.20 - 7.70 x10*3/uL    Immature Granulocytes Absolute, Automated 0.03 0.00 - 0.70 x10*3/uL    Lymphocytes Absolute 1.66 1.20 - 4.80 x10*3/uL    Monocytes Absolute 0.77 0.10 - 1.00 x10*3/uL    Eosinophils Absolute 0.07 0.00 - 0.70 x10*3/uL    Basophils Absolute 0.04 0.00 - 0.10 x10*3/uL   Coagulation Screen   Result Value Ref Range    Protime 11.8 9.8 - 12.8 seconds    INR 1.0 0.9 - 1.1    aPTT 31 27 - 38 seconds   Type And Screen   Result Value Ref Range    ABO TYPE A     Rh TYPE POS     ANTIBODY SCREEN NEG    Hemoglobin A1C   Result Value Ref Range    Hemoglobin A1C 4.9 see below %    Estimated Average Glucose 94 Not Established mg/dL   Urinalysis with Reflex Culture and Microscopic   Result Value Ref Range    Color, Urine Light-Yellow Light-Yellow, Yellow, Dark-Yellow    Appearance, Urine Clear Clear    Specific Gravity, Urine 1.009 1.005 - 1.035    pH, Urine 6.0 5.0, 5.5, 6.0, 6.5, 7.0, 7.5, 8.0    Protein, Urine NEGATIVE  NEGATIVE, 10 (TRACE), 20 (TRACE) mg/dL    Glucose, Urine Normal Normal mg/dL    Blood, Urine NEGATIVE NEGATIVE    Ketones, Urine NEGATIVE NEGATIVE mg/dL    Bilirubin, Urine NEGATIVE NEGATIVE    Urobilinogen, Urine Normal Normal mg/dL    Nitrite, Urine NEGATIVE NEGATIVE    Leukocyte Esterase, Urine NEGATIVE NEGATIVE   Extra Urine Gray Tube   Result Value Ref Range    Extra Tube Hold for add-ons.         === 07/31/24 ===    XR CHEST 2 VIEWS    - Impression -  1.  No evidence of acute cardiopulmonary process.      MRSA/MSSA culture 07/31/24 pending please review day of surgery- no iodine allergy.

## 2024-08-01 ENCOUNTER — ANESTHESIA EVENT (OUTPATIENT)
Dept: OPERATING ROOM | Facility: HOSPITAL | Age: 64
End: 2024-08-01
Payer: COMMERCIAL

## 2024-08-01 LAB — HOLD SPECIMEN: NORMAL

## 2024-08-01 NOTE — PROGRESS NOTES
Pharmacy Medication History Review    Maribel Lakhani is a 64 y.o. female who is planned to be admitted for Myxoma of heart ([facility]). Pharmacy called the patient prior to their scheduled procedure and reviewed the patient's [id number] medications for accuracy.    Medications ADDED:  none  Medications CHANGED:  none  Medications REMOVED:   none    Please review updated prior to admission medication list and comments regarding how patient may be taking medications differently by going to Admission tab --> Admission Orders --> Admit Orders / Review prior to admission medications.     Preferred pharmacy and allergies to be confirmed with patient by nursing the day of procedure.     Sources used to complete the med history include spoke with patient, surescripts, oarrs, care everywhere    Below are additional concerns with the patient's PTA list.  Patient stopped diclofenac on 07/25/24 and stopped ezetimibe on 07/31/24 in preparation of procedure    Alexandra Rubi    [facility]  Please reach out via Secure Chat for questions

## 2024-08-02 ENCOUNTER — HOSPITAL ENCOUNTER (OUTPATIENT)
Dept: OPERATING ROOM | Facility: HOSPITAL | Age: 64
Discharge: HOME | End: 2024-08-02

## 2024-08-02 ENCOUNTER — APPOINTMENT (OUTPATIENT)
Dept: SURGERY | Facility: CLINIC | Age: 64
End: 2024-08-02
Payer: COMMERCIAL

## 2024-08-02 ENCOUNTER — ANESTHESIA (OUTPATIENT)
Dept: OPERATING ROOM | Facility: HOSPITAL | Age: 64
End: 2024-08-02
Payer: COMMERCIAL

## 2024-08-02 ENCOUNTER — APPOINTMENT (OUTPATIENT)
Dept: RADIOLOGY | Facility: HOSPITAL | Age: 64
End: 2024-08-02
Payer: COMMERCIAL

## 2024-08-02 ENCOUNTER — APPOINTMENT (OUTPATIENT)
Dept: CARDIOLOGY | Facility: HOSPITAL | Age: 64
End: 2024-08-02
Payer: COMMERCIAL

## 2024-08-02 ENCOUNTER — HOSPITAL ENCOUNTER (INPATIENT)
Facility: HOSPITAL | Age: 64
End: 2024-08-02
Attending: STUDENT IN AN ORGANIZED HEALTH CARE EDUCATION/TRAINING PROGRAM | Admitting: STUDENT IN AN ORGANIZED HEALTH CARE EDUCATION/TRAINING PROGRAM
Payer: COMMERCIAL

## 2024-08-02 DIAGNOSIS — D15.1 MYXOMA OF HEART: Primary | ICD-10-CM

## 2024-08-02 DIAGNOSIS — R93.89 ABNORMAL FINDINGS ON DIAGNOSTIC IMAGING OF OTHER SPECIFIED BODY STRUCTURES: ICD-10-CM

## 2024-08-02 DIAGNOSIS — C18.4 MALIGNANT NEOPLASM OF TRANSVERSE COLON (MULTI): ICD-10-CM

## 2024-08-02 LAB
ABO GROUP (TYPE) IN BLOOD: NORMAL
ACT BLD: 101 SEC (ref 82–174)
ACT BLD: 478 SEC (ref 82–174)
ACT BLD: 489 SEC (ref 82–174)
ACT BLD: 501 SEC (ref 82–174)
ACT BLD: 685 SEC (ref 82–174)
ACT BLD: 96 SEC (ref 82–174)
ALBUMIN SERPL BCP-MCNC: 3.2 G/DL (ref 3.4–5)
ANION GAP BLDA CALCULATED.4IONS-SCNC: 10 MMO/L (ref 10–25)
ANION GAP BLDA CALCULATED.4IONS-SCNC: 11 MMO/L (ref 10–25)
ANION GAP BLDA CALCULATED.4IONS-SCNC: 11 MMO/L (ref 10–25)
ANION GAP BLDA CALCULATED.4IONS-SCNC: 12 MMO/L (ref 10–25)
ANION GAP BLDA CALCULATED.4IONS-SCNC: 13 MMO/L (ref 10–25)
ANION GAP BLDA CALCULATED.4IONS-SCNC: 9 MMO/L (ref 10–25)
ANION GAP BLDA CALCULATED.4IONS-SCNC: 9 MMO/L (ref 10–25)
ANION GAP BLDV CALCULATED.4IONS-SCNC: 11 MMOL/L (ref 10–25)
ANION GAP SERPL CALC-SCNC: 13 MMOL/L (ref 10–20)
ANTIBODY SCREEN: NORMAL
APTT PPP: 30 SECONDS (ref 27–38)
BASE EXCESS BLDA CALC-SCNC: -0.2 MMOL/L (ref -2–3)
BASE EXCESS BLDA CALC-SCNC: -0.7 MMOL/L (ref -2–3)
BASE EXCESS BLDA CALC-SCNC: -0.7 MMOL/L (ref -2–3)
BASE EXCESS BLDA CALC-SCNC: -1.2 MMOL/L (ref -2–3)
BASE EXCESS BLDA CALC-SCNC: -1.3 MMOL/L (ref -2–3)
BASE EXCESS BLDA CALC-SCNC: -1.5 MMOL/L (ref -2–3)
BASE EXCESS BLDA CALC-SCNC: -2.7 MMOL/L (ref -2–3)
BASE EXCESS BLDA CALC-SCNC: 0.1 MMOL/L (ref -2–3)
BASE EXCESS BLDA CALC-SCNC: 2.3 MMOL/L (ref -2–3)
BASE EXCESS BLDV CALC-SCNC: 1.4 MMOL/L (ref -2–3)
BODY TEMPERATURE: 37 DEGREES CELSIUS
BUN SERPL-MCNC: 9 MG/DL (ref 6–23)
CA-I BLD-SCNC: 1.17 MMOL/L (ref 1.1–1.33)
CA-I BLDA-SCNC: 1 MMOL/L (ref 1.1–1.33)
CA-I BLDA-SCNC: 1 MMOL/L (ref 1.1–1.33)
CA-I BLDA-SCNC: 1.07 MMOL/L (ref 1.1–1.33)
CA-I BLDA-SCNC: 1.11 MMOL/L (ref 1.1–1.33)
CA-I BLDA-SCNC: 1.15 MMOL/L (ref 1.1–1.33)
CA-I BLDA-SCNC: 1.18 MMOL/L (ref 1.1–1.33)
CA-I BLDA-SCNC: 1.18 MMOL/L (ref 1.1–1.33)
CA-I BLDA-SCNC: 1.25 MMOL/L (ref 1.1–1.33)
CA-I BLDA-SCNC: 1.35 MMOL/L (ref 1.1–1.33)
CA-I BLDV-SCNC: 1.11 MMOL/L (ref 1.1–1.33)
CALCIUM SERPL-MCNC: 8.5 MG/DL (ref 8.6–10.6)
CFT FORM KAOLIN IND BLD RES TEG: 1.1 MIN (ref 0.8–2.1)
CFT FORM KAOLIN IND BLD RES TEG: 1.3 MIN (ref 0.8–2.1)
CHLORIDE BLDA-SCNC: 106 MMOL/L (ref 98–107)
CHLORIDE BLDA-SCNC: 107 MMOL/L (ref 98–107)
CHLORIDE BLDA-SCNC: 107 MMOL/L (ref 98–107)
CHLORIDE BLDA-SCNC: 108 MMOL/L (ref 98–107)
CHLORIDE BLDA-SCNC: 109 MMOL/L (ref 98–107)
CHLORIDE BLDV-SCNC: 107 MMOL/L (ref 98–107)
CHLORIDE SERPL-SCNC: 109 MMOL/L (ref 98–107)
CLOT ANGLE.KAOLIN INDUCED BLD RES TEG: 72 DEG (ref 63–78)
CLOT ANGLE.KAOLIN INDUCED BLD RES TEG: 74 DEG (ref 63–78)
CLOT INIT KAO IND P HEP NEUT BLD RES TEG: 10.7 MIN (ref 4.3–8.3)
CLOT INIT KAO IND P HEP NEUT BLD RES TEG: 7.7 MIN (ref 4.3–8.3)
CLOT INIT KAO IND P HEP NEUT BLD RES TEG: 8.3 MIN (ref 4.6–9.1)
CLOT INIT KAO IND P HEP NEUT BLD RES TEG: 9.4 MIN (ref 4.6–9.1)
CO2 SERPL-SCNC: 25 MMOL/L (ref 21–32)
COHGB MFR BLDA: 0.8 %
COHGB MFR BLDA: 0.9 %
COHGB MFR BLDA: 0.9 %
COHGB MFR BLDA: 1 %
COHGB MFR BLDA: 1 %
COHGB MFR BLDA: 1.2 %
COHGB MFR BLDV: 1.6 %
CREAT SERPL-MCNC: 0.51 MG/DL (ref 0.5–1.05)
DO-HGB MFR BLDA: 0 % (ref 0–5)
DO-HGB MFR BLDA: 0.4 % (ref 0–5)
DO-HGB MFR BLDA: 0.5 % (ref 0–5)
DO-HGB MFR BLDA: 0.5 % (ref 0–5)
DO-HGB MFR BLDA: 1 % (ref 0–5)
DO-HGB MFR BLDA: 1.5 % (ref 0–5)
EGFRCR SERPLBLD CKD-EPI 2021: >90 ML/MIN/1.73M*2
ERYTHROCYTE [DISTWIDTH] IN BLOOD BY AUTOMATED COUNT: 13.9 % (ref 11.5–14.5)
FIBRINOGEN BLD CALC-MCNC: 440 MG/DL (ref 278–581)
FIBRINOGEN BLD CALC-MCNC: 564 MG/DL (ref 278–581)
FIBRINOGEN PPP-MCNC: 384 MG/DL (ref 200–400)
GLUCOSE BLD MANUAL STRIP-MCNC: 136 MG/DL (ref 74–99)
GLUCOSE BLDA-MCNC: 102 MG/DL (ref 74–99)
GLUCOSE BLDA-MCNC: 106 MG/DL (ref 74–99)
GLUCOSE BLDA-MCNC: 115 MG/DL (ref 74–99)
GLUCOSE BLDA-MCNC: 118 MG/DL (ref 74–99)
GLUCOSE BLDA-MCNC: 134 MG/DL (ref 74–99)
GLUCOSE BLDA-MCNC: 134 MG/DL (ref 74–99)
GLUCOSE BLDA-MCNC: 140 MG/DL (ref 74–99)
GLUCOSE BLDA-MCNC: 141 MG/DL (ref 74–99)
GLUCOSE BLDA-MCNC: 95 MG/DL (ref 74–99)
GLUCOSE BLDV-MCNC: 109 MG/DL (ref 74–99)
GLUCOSE SERPL-MCNC: 114 MG/DL (ref 74–99)
HCO3 BLDA-SCNC: 23.2 MMOL/L (ref 22–26)
HCO3 BLDA-SCNC: 23.7 MMOL/L (ref 22–26)
HCO3 BLDA-SCNC: 23.7 MMOL/L (ref 22–26)
HCO3 BLDA-SCNC: 24.3 MMOL/L (ref 22–26)
HCO3 BLDA-SCNC: 24.7 MMOL/L (ref 22–26)
HCO3 BLDA-SCNC: 24.8 MMOL/L (ref 22–26)
HCO3 BLDA-SCNC: 24.9 MMOL/L (ref 22–26)
HCO3 BLDA-SCNC: 25.4 MMOL/L (ref 22–26)
HCO3 BLDA-SCNC: 27.3 MMOL/L (ref 22–26)
HCO3 BLDV-SCNC: 26.6 MMOL/L (ref 22–26)
HCT VFR BLD AUTO: 33 % (ref 36–46)
HCT VFR BLD EST: 29 % (ref 36–46)
HCT VFR BLD EST: 29 % (ref 36–46)
HCT VFR BLD EST: 30 % (ref 36–46)
HCT VFR BLD EST: 31 % (ref 36–46)
HCT VFR BLD EST: 32 % (ref 36–46)
HCT VFR BLD EST: 35 % (ref 36–46)
HGB BLD-MCNC: 10.8 G/DL (ref 12–16)
HGB BLDA-MCNC: 10.1 G/DL (ref 12–16)
HGB BLDA-MCNC: 10.1 G/DL (ref 12–16)
HGB BLDA-MCNC: 10.2 G/DL (ref 12–16)
HGB BLDA-MCNC: 10.5 G/DL (ref 12–16)
HGB BLDA-MCNC: 11.6 G/DL (ref 12–16)
HGB BLDA-MCNC: 11.7 G/DL (ref 12–16)
HGB BLDA-MCNC: 9.7 G/DL (ref 12–16)
HGB BLDA-MCNC: 9.7 G/DL (ref 12–16)
HGB BLDA-MCNC: 9.8 G/DL (ref 12–16)
HGB BLDA-MCNC: 9.8 G/DL (ref 12–16)
HGB BLDV-MCNC: 10.2 G/DL (ref 12–16)
INHALED O2 CONCENTRATION: 100 %
INHALED O2 CONCENTRATION: 100 %
INHALED O2 CONCENTRATION: 36 %
INHALED O2 CONCENTRATION: 40 %
INHALED O2 CONCENTRATION: 40 %
INHALED O2 CONCENTRATION: 50 %
INHALED O2 CONCENTRATION: 70 %
INR PPP: 1.2 (ref 0.9–1.1)
LACTATE BLDA-SCNC: 0.5 MMOL/L (ref 0.4–2)
LACTATE BLDA-SCNC: 0.5 MMOL/L (ref 0.4–2)
LACTATE BLDA-SCNC: 0.6 MMOL/L (ref 0.4–2)
LACTATE BLDA-SCNC: 0.7 MMOL/L (ref 0.4–2)
LACTATE BLDA-SCNC: 1 MMOL/L (ref 0.4–2)
LACTATE BLDA-SCNC: 1.1 MMOL/L (ref 0.4–2)
LACTATE BLDA-SCNC: 1.3 MMOL/L (ref 0.4–2)
LACTATE BLDV-SCNC: 0.5 MMOL/L (ref 0.4–2)
MA KAOLIN BLD RES TEG: 60 MM (ref 52–69)
MA KAOLIN BLD RES TEG: 64 MM (ref 52–69)
MA KAOLIN+TF BLD RES TEG: 61 MM (ref 52–70)
MA KAOLIN+TF BLD RES TEG: 67 MM (ref 52–70)
MA TF IND+IIB-IIIA INH BLD RES TEG: 24 MM (ref 15–32)
MA TF IND+IIB-IIIA INH BLD RES TEG: 31 MM (ref 15–32)
MAGNESIUM SERPL-MCNC: 3.33 MG/DL (ref 1.6–2.4)
MCH RBC QN AUTO: 31.7 PG (ref 26–34)
MCHC RBC AUTO-ENTMCNC: 32.7 G/DL (ref 32–36)
MCV RBC AUTO: 97 FL (ref 80–100)
METHGB MFR BLDA: 0.6 % (ref 0–1.5)
METHGB MFR BLDA: 0.6 % (ref 0–1.5)
METHGB MFR BLDA: 0.8 % (ref 0–1.5)
METHGB MFR BLDA: 0.8 % (ref 0–1.5)
METHGB MFR BLDA: 0.9 % (ref 0–1.5)
METHGB MFR BLDA: 0.9 % (ref 0–1.5)
METHGB MFR BLDV: 0.6 % (ref 0–1.5)
NRBC BLD-RTO: 0 /100 WBCS (ref 0–0)
OXYHGB MFR BLDA: 96.5 % (ref 94–98)
OXYHGB MFR BLDA: 96.9 % (ref 94–98)
OXYHGB MFR BLDA: 96.9 % (ref 94–98)
OXYHGB MFR BLDA: 97 % (ref 94–98)
OXYHGB MFR BLDA: 97 % (ref 94–98)
OXYHGB MFR BLDA: 97.3 % (ref 94–98)
OXYHGB MFR BLDA: 97.8 % (ref 94–98)
OXYHGB MFR BLDA: 97.9 % (ref 94–98)
OXYHGB MFR BLDA: 97.9 % (ref 94–98)
OXYHGB MFR BLDA: 98 % (ref 94–98)
OXYHGB MFR BLDA: 98 % (ref 94–98)
OXYHGB MFR BLDA: 98.1 % (ref 94–98)
OXYHGB MFR BLDA: 98.1 % (ref 94–98)
OXYHGB MFR BLDV: 88.5 % (ref 45–75)
PCO2 BLDA: 39 MM HG (ref 38–42)
PCO2 BLDA: 40 MM HG (ref 38–42)
PCO2 BLDA: 41 MM HG (ref 38–42)
PCO2 BLDA: 41 MM HG (ref 38–42)
PCO2 BLDA: 43 MM HG (ref 38–42)
PCO2 BLDA: 43 MM HG (ref 38–42)
PCO2 BLDA: 44 MM HG (ref 38–42)
PCO2 BLDA: 44 MM HG (ref 38–42)
PCO2 BLDA: 47 MM HG (ref 38–42)
PCO2 BLDV: 44 MM HG (ref 41–51)
PH BLDA: 7.33 PH (ref 7.38–7.42)
PH BLDA: 7.34 PH (ref 7.38–7.42)
PH BLDA: 7.36 PH (ref 7.38–7.42)
PH BLDA: 7.36 PH (ref 7.38–7.42)
PH BLDA: 7.37 PH (ref 7.38–7.42)
PH BLDA: 7.38 PH (ref 7.38–7.42)
PH BLDA: 7.39 PH (ref 7.38–7.42)
PH BLDA: 7.41 PH (ref 7.38–7.42)
PH BLDA: 7.41 PH (ref 7.38–7.42)
PH BLDV: 7.39 PH (ref 7.33–7.43)
PHOSPHATE SERPL-MCNC: 2.9 MG/DL (ref 2.5–4.9)
PLATELET # BLD AUTO: 128 X10*3/UL (ref 150–450)
PO2 BLDA: 105 MM HG (ref 85–95)
PO2 BLDA: 115 MM HG (ref 85–95)
PO2 BLDA: 118 MM HG (ref 85–95)
PO2 BLDA: 126 MM HG (ref 85–95)
PO2 BLDA: 179 MM HG (ref 85–95)
PO2 BLDA: 288 MM HG (ref 85–95)
PO2 BLDA: 310 MM HG (ref 85–95)
PO2 BLDA: 341 MM HG (ref 85–95)
PO2 BLDA: 98 MM HG (ref 85–95)
PO2 BLDV: 56 MM HG (ref 35–45)
POTASSIUM BLDA-SCNC: 3.6 MMOL/L (ref 3.5–5.3)
POTASSIUM BLDA-SCNC: 3.6 MMOL/L (ref 3.5–5.3)
POTASSIUM BLDA-SCNC: 3.7 MMOL/L (ref 3.5–5.3)
POTASSIUM BLDA-SCNC: 3.9 MMOL/L (ref 3.5–5.3)
POTASSIUM BLDA-SCNC: 3.9 MMOL/L (ref 3.5–5.3)
POTASSIUM BLDA-SCNC: 4 MMOL/L (ref 3.5–5.3)
POTASSIUM BLDA-SCNC: 4.1 MMOL/L (ref 3.5–5.3)
POTASSIUM BLDA-SCNC: 4.4 MMOL/L (ref 3.5–5.3)
POTASSIUM BLDA-SCNC: 4.9 MMOL/L (ref 3.5–5.3)
POTASSIUM BLDV-SCNC: 3.6 MMOL/L (ref 3.5–5.3)
POTASSIUM SERPL-SCNC: 4.1 MMOL/L (ref 3.5–5.3)
PROTHROMBIN TIME: 13.2 SECONDS (ref 9.8–12.8)
RBC # BLD AUTO: 3.41 X10*6/UL (ref 4–5.2)
RH FACTOR (ANTIGEN D): NORMAL
SAO2 % BLDA: 100 % (ref 94–100)
SAO2 % BLDA: 99 % (ref 94–100)
SAO2 % BLDV: 91 % (ref 45–75)
SODIUM BLDA-SCNC: 137 MMOL/L (ref 136–145)
SODIUM BLDA-SCNC: 138 MMOL/L (ref 136–145)
SODIUM BLDA-SCNC: 139 MMOL/L (ref 136–145)
SODIUM BLDA-SCNC: 140 MMOL/L (ref 136–145)
SODIUM BLDA-SCNC: 141 MMOL/L (ref 136–145)
SODIUM BLDV-SCNC: 141 MMOL/L (ref 136–145)
SODIUM SERPL-SCNC: 143 MMOL/L (ref 136–145)
STAPHYLOCOCCUS SPEC CULT: NORMAL
TEST COMMENT: ABNORMAL
TEST COMMENT: NORMAL
WBC # BLD AUTO: 9.2 X10*3/UL (ref 4.4–11.3)

## 2024-08-02 PROCEDURE — 85347 COAGULATION TIME ACTIVATED: CPT

## 2024-08-02 PROCEDURE — A4649 SURGICAL SUPPLIES: HCPCS | Performed by: STUDENT IN AN ORGANIZED HEALTH CARE EDUCATION/TRAINING PROGRAM

## 2024-08-02 PROCEDURE — 71045 X-RAY EXAM CHEST 1 VIEW: CPT

## 2024-08-02 PROCEDURE — 2500000005 HC RX 250 GENERAL PHARMACY W/O HCPCS: Performed by: ANESTHESIOLOGIST ASSISTANT

## 2024-08-02 PROCEDURE — 82330 ASSAY OF CALCIUM: CPT

## 2024-08-02 PROCEDURE — 82810 BLOOD GASES O2 SAT ONLY: CPT

## 2024-08-02 PROCEDURE — 84132 ASSAY OF SERUM POTASSIUM: CPT

## 2024-08-02 PROCEDURE — 85384 FIBRINOGEN ACTIVITY: CPT

## 2024-08-02 PROCEDURE — 3600000017 HC OR TIME - EACH INCREMENTAL 1 MINUTE - PROCEDURE LEVEL SIX: Performed by: STUDENT IN AN ORGANIZED HEALTH CARE EDUCATION/TRAINING PROGRAM

## 2024-08-02 PROCEDURE — 2500000001 HC RX 250 WO HCPCS SELF ADMINISTERED DRUGS (ALT 637 FOR MEDICARE OP)

## 2024-08-02 PROCEDURE — 2500000004 HC RX 250 GENERAL PHARMACY W/ HCPCS (ALT 636 FOR OP/ED): Performed by: STUDENT IN AN ORGANIZED HEALTH CARE EDUCATION/TRAINING PROGRAM

## 2024-08-02 PROCEDURE — 2720000007 HC OR 272 NO HCPCS: Performed by: STUDENT IN AN ORGANIZED HEALTH CARE EDUCATION/TRAINING PROGRAM

## 2024-08-02 PROCEDURE — 2500000004 HC RX 250 GENERAL PHARMACY W/ HCPCS (ALT 636 FOR OP/ED): Performed by: EMERGENCY MEDICINE

## 2024-08-02 PROCEDURE — 3600000012 HC PERFUSION TIME - EACH INCREMENTAL 1 MINUTE: Performed by: STUDENT IN AN ORGANIZED HEALTH CARE EDUCATION/TRAINING PROGRAM

## 2024-08-02 PROCEDURE — 3600000011 HC PERFUSION TIME - INITIAL BASE CHARGE: Performed by: STUDENT IN AN ORGANIZED HEALTH CARE EDUCATION/TRAINING PROGRAM

## 2024-08-02 PROCEDURE — 5A1221Z PERFORMANCE OF CARDIAC OUTPUT, CONTINUOUS: ICD-10-PCS | Performed by: STUDENT IN AN ORGANIZED HEALTH CARE EDUCATION/TRAINING PROGRAM

## 2024-08-02 PROCEDURE — 85027 COMPLETE CBC AUTOMATED: CPT

## 2024-08-02 PROCEDURE — 3700000001 HC GENERAL ANESTHESIA TIME - INITIAL BASE CHARGE: Performed by: STUDENT IN AN ORGANIZED HEALTH CARE EDUCATION/TRAINING PROGRAM

## 2024-08-02 PROCEDURE — 36415 COLL VENOUS BLD VENIPUNCTURE: CPT | Performed by: STUDENT IN AN ORGANIZED HEALTH CARE EDUCATION/TRAINING PROGRAM

## 2024-08-02 PROCEDURE — 2500000004 HC RX 250 GENERAL PHARMACY W/ HCPCS (ALT 636 FOR OP/ED): Mod: JZ

## 2024-08-02 PROCEDURE — 88307 TISSUE EXAM BY PATHOLOGIST: CPT | Mod: TC,SUR | Performed by: STUDENT IN AN ORGANIZED HEALTH CARE EDUCATION/TRAINING PROGRAM

## 2024-08-02 PROCEDURE — 2500000005 HC RX 250 GENERAL PHARMACY W/O HCPCS: Performed by: STUDENT IN AN ORGANIZED HEALTH CARE EDUCATION/TRAINING PROGRAM

## 2024-08-02 PROCEDURE — 2500000004 HC RX 250 GENERAL PHARMACY W/ HCPCS (ALT 636 FOR OP/ED): Performed by: ANESTHESIOLOGIST ASSISTANT

## 2024-08-02 PROCEDURE — P9047 ALBUMIN (HUMAN), 25%, 50ML: HCPCS | Mod: JZ | Performed by: STUDENT IN AN ORGANIZED HEALTH CARE EDUCATION/TRAINING PROGRAM

## 2024-08-02 PROCEDURE — 85730 THROMBOPLASTIN TIME PARTIAL: CPT

## 2024-08-02 PROCEDURE — 71045 X-RAY EXAM CHEST 1 VIEW: CPT | Performed by: RADIOLOGY

## 2024-08-02 PROCEDURE — 82375 ASSAY CARBOXYHB QUANT: CPT

## 2024-08-02 PROCEDURE — 93005 ELECTROCARDIOGRAM TRACING: CPT

## 2024-08-02 PROCEDURE — 93010 ELECTROCARDIOGRAM REPORT: CPT | Performed by: INTERNAL MEDICINE

## 2024-08-02 PROCEDURE — 83735 ASSAY OF MAGNESIUM: CPT

## 2024-08-02 PROCEDURE — 88307 TISSUE EXAM BY PATHOLOGIST: CPT | Performed by: PATHOLOGY

## 2024-08-02 PROCEDURE — 3600000018 HC OR TIME - INITIAL BASE CHARGE - PROCEDURE LEVEL SIX: Performed by: STUDENT IN AN ORGANIZED HEALTH CARE EDUCATION/TRAINING PROGRAM

## 2024-08-02 PROCEDURE — 99291 CRITICAL CARE FIRST HOUR: CPT

## 2024-08-02 PROCEDURE — 2500000001 HC RX 250 WO HCPCS SELF ADMINISTERED DRUGS (ALT 637 FOR MEDICARE OP): Performed by: STUDENT IN AN ORGANIZED HEALTH CARE EDUCATION/TRAINING PROGRAM

## 2024-08-02 PROCEDURE — 2020000001 HC ICU ROOM DAILY

## 2024-08-02 PROCEDURE — P9045 ALBUMIN (HUMAN), 5%, 250 ML: HCPCS | Mod: JZ

## 2024-08-02 PROCEDURE — 86900 BLOOD TYPING SEROLOGIC ABO: CPT | Performed by: STUDENT IN AN ORGANIZED HEALTH CARE EDUCATION/TRAINING PROGRAM

## 2024-08-02 PROCEDURE — 33416 REVISE VENTRICLE MUSCLE: CPT | Performed by: STUDENT IN AN ORGANIZED HEALTH CARE EDUCATION/TRAINING PROGRAM

## 2024-08-02 PROCEDURE — 82947 ASSAY GLUCOSE BLOOD QUANT: CPT

## 2024-08-02 PROCEDURE — 83050 HGB METHEMOGLOBIN QUAN: CPT

## 2024-08-02 PROCEDURE — 37799 UNLISTED PX VASCULAR SURGERY: CPT

## 2024-08-02 PROCEDURE — 02B70ZZ EXCISION OF LEFT ATRIUM, OPEN APPROACH: ICD-10-PCS | Performed by: STUDENT IN AN ORGANIZED HEALTH CARE EDUCATION/TRAINING PROGRAM

## 2024-08-02 PROCEDURE — 85610 PROTHROMBIN TIME: CPT

## 2024-08-02 PROCEDURE — C1900 LEAD, CORONARY VENOUS: HCPCS | Performed by: STUDENT IN AN ORGANIZED HEALTH CARE EDUCATION/TRAINING PROGRAM

## 2024-08-02 PROCEDURE — 94002 VENT MGMT INPAT INIT DAY: CPT

## 2024-08-02 PROCEDURE — 86901 BLOOD TYPING SEROLOGIC RH(D): CPT | Performed by: STUDENT IN AN ORGANIZED HEALTH CARE EDUCATION/TRAINING PROGRAM

## 2024-08-02 PROCEDURE — 3700000002 HC GENERAL ANESTHESIA TIME - EACH INCREMENTAL 1 MINUTE: Performed by: STUDENT IN AN ORGANIZED HEALTH CARE EDUCATION/TRAINING PROGRAM

## 2024-08-02 PROCEDURE — 33416 REVISE VENTRICLE MUSCLE: CPT | Performed by: THORACIC SURGERY (CARDIOTHORACIC VASCULAR SURGERY)

## 2024-08-02 PROCEDURE — 2500000004 HC RX 250 GENERAL PHARMACY W/ HCPCS (ALT 636 FOR OP/ED)

## 2024-08-02 RX ORDER — SODIUM CHLORIDE, SODIUM GLUCONATE, SODIUM ACETATE, POTASSIUM CHLORIDE AND MAGNESIUM CHLORIDE 30; 37; 368; 526; 502 MG/100ML; MG/100ML; MG/100ML; MG/100ML; MG/100ML
INJECTION, SOLUTION INTRAVENOUS CONTINUOUS PRN
Status: DISCONTINUED | OUTPATIENT
Start: 2024-08-02 | End: 2024-08-02

## 2024-08-02 RX ORDER — PHENYLEPHRINE HYDROCHLORIDE 10 MG/ML
INJECTION INTRAVENOUS AS NEEDED
Status: DISCONTINUED | OUTPATIENT
Start: 2024-08-02 | End: 2024-08-02

## 2024-08-02 RX ORDER — PANTOPRAZOLE SODIUM 40 MG/10ML
40 INJECTION, POWDER, LYOPHILIZED, FOR SOLUTION INTRAVENOUS
Status: DISCONTINUED | OUTPATIENT
Start: 2024-08-03 | End: 2024-08-03

## 2024-08-02 RX ORDER — ROCURONIUM BROMIDE 10 MG/ML
INJECTION, SOLUTION INTRAVENOUS AS NEEDED
Status: DISCONTINUED | OUTPATIENT
Start: 2024-08-02 | End: 2024-08-02

## 2024-08-02 RX ORDER — LIDOCAINE HYDROCHLORIDE 20 MG/ML
INJECTION, SOLUTION INFILTRATION; PERINEURAL AS NEEDED
Status: DISCONTINUED | OUTPATIENT
Start: 2024-08-02 | End: 2024-08-02

## 2024-08-02 RX ORDER — CEFAZOLIN SODIUM 2 G/100ML
2 INJECTION, SOLUTION INTRAVENOUS EVERY 8 HOURS
Status: COMPLETED | OUTPATIENT
Start: 2024-08-02 | End: 2024-08-04

## 2024-08-02 RX ORDER — ONDANSETRON HYDROCHLORIDE 2 MG/ML
4 INJECTION, SOLUTION INTRAVENOUS EVERY 8 HOURS PRN
Status: DISCONTINUED | OUTPATIENT
Start: 2024-08-02 | End: 2024-08-08 | Stop reason: HOSPADM

## 2024-08-02 RX ORDER — NALOXONE HYDROCHLORIDE 0.4 MG/ML
0.2 INJECTION, SOLUTION INTRAMUSCULAR; INTRAVENOUS; SUBCUTANEOUS EVERY 5 MIN PRN
Status: DISCONTINUED | OUTPATIENT
Start: 2024-08-02 | End: 2024-08-08 | Stop reason: HOSPADM

## 2024-08-02 RX ORDER — CALCIUM GLUCONATE 20 MG/ML
1 INJECTION, SOLUTION INTRAVENOUS EVERY 6 HOURS PRN
Status: DISCONTINUED | OUTPATIENT
Start: 2024-08-02 | End: 2024-08-03

## 2024-08-02 RX ORDER — TRAZODONE HYDROCHLORIDE 50 MG/1
100 TABLET ORAL NIGHTLY
Status: DISCONTINUED | OUTPATIENT
Start: 2024-08-02 | End: 2024-08-08 | Stop reason: HOSPADM

## 2024-08-02 RX ORDER — CALCIUM GLUCONATE 20 MG/ML
2 INJECTION, SOLUTION INTRAVENOUS EVERY 6 HOURS PRN
Status: DISCONTINUED | OUTPATIENT
Start: 2024-08-02 | End: 2024-08-03

## 2024-08-02 RX ORDER — PANTOPRAZOLE SODIUM 40 MG/1
40 TABLET, DELAYED RELEASE ORAL
Status: DISCONTINUED | OUTPATIENT
Start: 2024-08-03 | End: 2024-08-03

## 2024-08-02 RX ORDER — PROPOFOL 10 MG/ML
5-50 INJECTION, EMULSION INTRAVENOUS CONTINUOUS
Status: DISCONTINUED | OUTPATIENT
Start: 2024-08-02 | End: 2024-08-02

## 2024-08-02 RX ORDER — SODIUM CHLORIDE, SODIUM LACTATE, POTASSIUM CHLORIDE, CALCIUM CHLORIDE 600; 310; 30; 20 MG/100ML; MG/100ML; MG/100ML; MG/100ML
5 INJECTION, SOLUTION INTRAVENOUS CONTINUOUS
Status: DISCONTINUED | OUTPATIENT
Start: 2024-08-02 | End: 2024-08-03

## 2024-08-02 RX ORDER — NITROGLYCERIN 40 MG/100ML
INJECTION INTRAVENOUS AS NEEDED
Status: DISCONTINUED | OUTPATIENT
Start: 2024-08-02 | End: 2024-08-02

## 2024-08-02 RX ORDER — PROTAMINE SULFATE 10 MG/ML
INJECTION, SOLUTION INTRAVENOUS AS NEEDED
Status: DISCONTINUED | OUTPATIENT
Start: 2024-08-02 | End: 2024-08-02

## 2024-08-02 RX ORDER — INSULIN LISPRO 100 [IU]/ML
0-15 INJECTION, SOLUTION INTRAVENOUS; SUBCUTANEOUS EVERY 4 HOURS
Status: DISCONTINUED | OUTPATIENT
Start: 2024-08-02 | End: 2024-08-02

## 2024-08-02 RX ORDER — GLYCOPYRROLATE 0.2 MG/ML
0.6 INJECTION INTRAMUSCULAR; INTRAVENOUS ONCE
Status: COMPLETED | OUTPATIENT
Start: 2024-08-02 | End: 2024-08-02

## 2024-08-02 RX ORDER — HEPARIN SODIUM 1000 [USP'U]/ML
INJECTION, SOLUTION INTRAVENOUS; SUBCUTANEOUS AS NEEDED
Status: DISCONTINUED | OUTPATIENT
Start: 2024-08-02 | End: 2024-08-02

## 2024-08-02 RX ORDER — OXYCODONE HYDROCHLORIDE 5 MG/1
10 TABLET ORAL EVERY 4 HOURS PRN
Status: DISCONTINUED | OUTPATIENT
Start: 2024-08-02 | End: 2024-08-06

## 2024-08-02 RX ORDER — GLYCOPYRROLATE 0.2 MG/ML
INJECTION INTRAMUSCULAR; INTRAVENOUS AS NEEDED
Status: DISCONTINUED | OUTPATIENT
Start: 2024-08-02 | End: 2024-08-02

## 2024-08-02 RX ORDER — METHADONE IN SOD CHLOR,ISO-OSM 10 MG/ML
SYRINGE (ML) INTRAVENOUS AS NEEDED
Status: DISCONTINUED | OUTPATIENT
Start: 2024-08-02 | End: 2024-08-02

## 2024-08-02 RX ORDER — SODIUM CHLORIDE, SODIUM LACTATE, POTASSIUM CHLORIDE, CALCIUM CHLORIDE 600; 310; 30; 20 MG/100ML; MG/100ML; MG/100ML; MG/100ML
20 INJECTION, SOLUTION INTRAVENOUS CONTINUOUS
Status: DISCONTINUED | OUTPATIENT
Start: 2024-08-02 | End: 2024-08-02

## 2024-08-02 RX ORDER — MIDAZOLAM HYDROCHLORIDE 1 MG/ML
INJECTION INTRAMUSCULAR; INTRAVENOUS AS NEEDED
Status: DISCONTINUED | OUTPATIENT
Start: 2024-08-02 | End: 2024-08-02

## 2024-08-02 RX ORDER — POTASSIUM CHLORIDE 14.9 MG/ML
20 INJECTION INTRAVENOUS EVERY 6 HOURS PRN
Status: DISCONTINUED | OUTPATIENT
Start: 2024-08-02 | End: 2024-08-03

## 2024-08-02 RX ORDER — DEXTROSE 50 % IN WATER (D50W) INTRAVENOUS SYRINGE
25
Status: DISCONTINUED | OUTPATIENT
Start: 2024-08-02 | End: 2024-08-08 | Stop reason: HOSPADM

## 2024-08-02 RX ORDER — OXYCODONE HYDROCHLORIDE 5 MG/1
5 TABLET ORAL EVERY 4 HOURS PRN
Status: DISCONTINUED | OUTPATIENT
Start: 2024-08-02 | End: 2024-08-07

## 2024-08-02 RX ORDER — ALBUMIN HUMAN 50 G/1000ML
12.5 SOLUTION INTRAVENOUS ONCE
Status: COMPLETED | OUTPATIENT
Start: 2024-08-02 | End: 2024-08-02

## 2024-08-02 RX ORDER — BUPIVACAINE HYDROCHLORIDE 2.5 MG/ML
INJECTION, SOLUTION EPIDURAL; INFILTRATION; INTRACAUDAL; PERINEURAL AS NEEDED
Status: DISCONTINUED | OUTPATIENT
Start: 2024-08-02 | End: 2024-08-02 | Stop reason: HOSPADM

## 2024-08-02 RX ORDER — NOREPINEPHRINE BITARTRATE 0.03 MG/ML
INJECTION, SOLUTION INTRAVENOUS CONTINUOUS PRN
Status: DISCONTINUED | OUTPATIENT
Start: 2024-08-02 | End: 2024-08-02

## 2024-08-02 RX ORDER — HYDROMORPHONE HYDROCHLORIDE 1 MG/ML
0.2 INJECTION, SOLUTION INTRAMUSCULAR; INTRAVENOUS; SUBCUTANEOUS
Status: DISCONTINUED | OUTPATIENT
Start: 2024-08-02 | End: 2024-08-03

## 2024-08-02 RX ORDER — SODIUM CHLORIDE, SODIUM LACTATE, POTASSIUM CHLORIDE, CALCIUM CHLORIDE 600; 310; 30; 20 MG/100ML; MG/100ML; MG/100ML; MG/100ML
30 INJECTION, SOLUTION INTRAVENOUS CONTINUOUS
Status: DISCONTINUED | OUTPATIENT
Start: 2024-08-02 | End: 2024-08-03

## 2024-08-02 RX ORDER — POLYETHYLENE GLYCOL 3350 17 G/17G
17 POWDER, FOR SOLUTION ORAL DAILY
Status: DISCONTINUED | OUTPATIENT
Start: 2024-08-02 | End: 2024-08-04

## 2024-08-02 RX ORDER — LIDOCAINE HYDROCHLORIDE 10 MG/ML
INJECTION INFILTRATION; PERINEURAL AS NEEDED
Status: DISCONTINUED | OUTPATIENT
Start: 2024-08-02 | End: 2024-08-02

## 2024-08-02 RX ORDER — AMOXICILLIN 250 MG
2 CAPSULE ORAL 2 TIMES DAILY
Status: DISCONTINUED | OUTPATIENT
Start: 2024-08-02 | End: 2024-08-08 | Stop reason: HOSPADM

## 2024-08-02 RX ORDER — CEFAZOLIN 1 G/1
INJECTION, POWDER, FOR SOLUTION INTRAVENOUS AS NEEDED
Status: DISCONTINUED | OUTPATIENT
Start: 2024-08-02 | End: 2024-08-02

## 2024-08-02 RX ORDER — NEOSTIGMINE METHYLSULFATE 1 MG/ML
3 INJECTION INTRAVENOUS ONCE
Status: COMPLETED | OUTPATIENT
Start: 2024-08-02 | End: 2024-08-02

## 2024-08-02 RX ORDER — MANNITOL 20 G/100ML
INJECTION, SOLUTION INTRAVENOUS AS NEEDED
Status: DISCONTINUED | OUTPATIENT
Start: 2024-08-02 | End: 2024-08-02

## 2024-08-02 RX ORDER — NITROGLYCERIN 20 MG/100ML
0-200 INJECTION INTRAVENOUS CONTINUOUS
Status: DISCONTINUED | OUTPATIENT
Start: 2024-08-02 | End: 2024-08-03

## 2024-08-02 RX ORDER — MAGNESIUM SULFATE HEPTAHYDRATE 500 MG/ML
INJECTION, SOLUTION INTRAMUSCULAR; INTRAVENOUS AS NEEDED
Status: DISCONTINUED | OUTPATIENT
Start: 2024-08-02 | End: 2024-08-02

## 2024-08-02 RX ORDER — CALCIUM CHLORIDE INJECTION 100 MG/ML
INJECTION, SOLUTION INTRAVENOUS AS NEEDED
Status: DISCONTINUED | OUTPATIENT
Start: 2024-08-02 | End: 2024-08-02

## 2024-08-02 RX ORDER — MAGNESIUM SULFATE HEPTAHYDRATE 40 MG/ML
4 INJECTION, SOLUTION INTRAVENOUS EVERY 6 HOURS PRN
Status: DISCONTINUED | OUTPATIENT
Start: 2024-08-02 | End: 2024-08-03

## 2024-08-02 RX ORDER — PROPOFOL 10 MG/ML
INJECTION, EMULSION INTRAVENOUS AS NEEDED
Status: DISCONTINUED | OUTPATIENT
Start: 2024-08-02 | End: 2024-08-02

## 2024-08-02 RX ORDER — ONDANSETRON 4 MG/1
4 TABLET, FILM COATED ORAL EVERY 8 HOURS PRN
Status: DISCONTINUED | OUTPATIENT
Start: 2024-08-02 | End: 2024-08-03

## 2024-08-02 RX ORDER — NITROGLYCERIN 20 MG/100ML
INJECTION INTRAVENOUS
Status: COMPLETED
Start: 2024-08-02 | End: 2024-08-02

## 2024-08-02 RX ORDER — FENTANYL CITRATE 50 UG/ML
INJECTION, SOLUTION INTRAMUSCULAR; INTRAVENOUS AS NEEDED
Status: DISCONTINUED | OUTPATIENT
Start: 2024-08-02 | End: 2024-08-02

## 2024-08-02 RX ORDER — ESMOLOL HYDROCHLORIDE 10 MG/ML
INJECTION INTRAVENOUS AS NEEDED
Status: DISCONTINUED | OUTPATIENT
Start: 2024-08-02 | End: 2024-08-02

## 2024-08-02 RX ORDER — POTASSIUM CHLORIDE 29.8 MG/ML
40 INJECTION INTRAVENOUS EVERY 6 HOURS PRN
Status: DISCONTINUED | OUTPATIENT
Start: 2024-08-02 | End: 2024-08-03

## 2024-08-02 RX ORDER — MAGNESIUM SULFATE HEPTAHYDRATE 40 MG/ML
2 INJECTION, SOLUTION INTRAVENOUS EVERY 6 HOURS PRN
Status: DISCONTINUED | OUTPATIENT
Start: 2024-08-02 | End: 2024-08-03

## 2024-08-02 RX ORDER — INSULIN LISPRO 100 [IU]/ML
0-15 INJECTION, SOLUTION INTRAVENOUS; SUBCUTANEOUS
Status: DISCONTINUED | OUTPATIENT
Start: 2024-08-02 | End: 2024-08-03

## 2024-08-02 RX ORDER — ALBUMIN HUMAN 50 G/1000ML
SOLUTION INTRAVENOUS
Status: COMPLETED
Start: 2024-08-02 | End: 2024-08-02

## 2024-08-02 RX ORDER — LIDOCAINE HYDROCHLORIDE 10 MG/ML
INJECTION, SOLUTION INFILTRATION; PERINEURAL AS NEEDED
Status: DISCONTINUED | OUTPATIENT
Start: 2024-08-02 | End: 2024-08-02 | Stop reason: HOSPADM

## 2024-08-02 RX ORDER — ACETAMINOPHEN 325 MG/1
650 TABLET ORAL EVERY 6 HOURS
Status: DISCONTINUED | OUTPATIENT
Start: 2024-08-02 | End: 2024-08-08 | Stop reason: HOSPADM

## 2024-08-02 RX ADMIN — OXYCODONE HYDROCHLORIDE 10 MG: 5 TABLET ORAL at 19:51

## 2024-08-02 RX ADMIN — TRAZODONE HYDROCHLORIDE 100 MG: 50 TABLET ORAL at 21:48

## 2024-08-02 RX ADMIN — SODIUM CHLORIDE, POTASSIUM CHLORIDE, SODIUM LACTATE AND CALCIUM CHLORIDE 30 ML/HR: 600; 310; 30; 20 INJECTION, SOLUTION INTRAVENOUS at 13:45

## 2024-08-02 RX ADMIN — SODIUM CHLORIDE, POTASSIUM CHLORIDE, SODIUM LACTATE AND CALCIUM CHLORIDE 250 ML: 600; 310; 30; 20 INJECTION, SOLUTION INTRAVENOUS at 15:31

## 2024-08-02 RX ADMIN — HYDROMORPHONE HYDROCHLORIDE 0.2 MG: 1 INJECTION, SOLUTION INTRAMUSCULAR; INTRAVENOUS; SUBCUTANEOUS at 16:11

## 2024-08-02 RX ADMIN — HYDROMORPHONE HYDROCHLORIDE 0.2 MG: 1 INJECTION, SOLUTION INTRAMUSCULAR; INTRAVENOUS; SUBCUTANEOUS at 18:41

## 2024-08-02 RX ADMIN — GLYCOPYRROLATE 0.6 MG: 0.2 INJECTION, SOLUTION INTRAMUSCULAR; INTRAVENOUS at 13:54

## 2024-08-02 RX ADMIN — ALBUMIN HUMAN 12.5 G: 50 SOLUTION INTRAVENOUS at 13:59

## 2024-08-02 RX ADMIN — ALBUMIN HUMAN 12.5 G: 0.05 INJECTION, SOLUTION INTRAVENOUS at 13:59

## 2024-08-02 RX ADMIN — CEFAZOLIN SODIUM 2 G: 2 INJECTION, SOLUTION INTRAVENOUS at 15:53

## 2024-08-02 RX ADMIN — PROPOFOL 40 MCG/KG/MIN: 10 INJECTION, EMULSION INTRAVENOUS at 13:22

## 2024-08-02 RX ADMIN — NEOSTIGMINE METHYLSULFATE 3 MG: 1 INJECTION, SOLUTION INTRAVENOUS at 13:54

## 2024-08-02 RX ADMIN — SENNOSIDES AND DOCUSATE SODIUM 2 TABLET: 50; 8.6 TABLET ORAL at 20:00

## 2024-08-02 RX ADMIN — NITROGLYCERIN 5 MCG/MIN: 20 INJECTION INTRAVENOUS at 13:20

## 2024-08-02 RX ADMIN — HYDROMORPHONE HYDROCHLORIDE 0.2 MG: 1 INJECTION, SOLUTION INTRAMUSCULAR; INTRAVENOUS; SUBCUTANEOUS at 15:17

## 2024-08-02 RX ADMIN — SODIUM CHLORIDE, POTASSIUM CHLORIDE, SODIUM LACTATE AND CALCIUM CHLORIDE 5 ML/HR: 600; 310; 30; 20 INJECTION, SOLUTION INTRAVENOUS at 13:46

## 2024-08-02 RX ADMIN — SODIUM CHLORIDE, POTASSIUM CHLORIDE, SODIUM LACTATE AND CALCIUM CHLORIDE 500 ML: 600; 310; 30; 20 INJECTION, SOLUTION INTRAVENOUS at 13:45

## 2024-08-02 RX ADMIN — HYDROMORPHONE HYDROCHLORIDE 0.2 MG: 1 INJECTION, SOLUTION INTRAMUSCULAR; INTRAVENOUS; SUBCUTANEOUS at 14:10

## 2024-08-02 RX ADMIN — ACETAMINOPHEN 650 MG: 325 TABLET ORAL at 19:51

## 2024-08-02 SDOH — HEALTH STABILITY: MENTAL HEALTH: CURRENT SMOKER: 0

## 2024-08-02 ASSESSMENT — PAIN - FUNCTIONAL ASSESSMENT
PAIN_FUNCTIONAL_ASSESSMENT: 0-10

## 2024-08-02 ASSESSMENT — PAIN SCALES - GENERAL
PAINLEVEL_OUTOF10: 7
PAINLEVEL_OUTOF10: 0 - NO PAIN
PAINLEVEL_OUTOF10: 8
PAINLEVEL_OUTOF10: 4
PAINLEVEL_OUTOF10: 0 - NO PAIN

## 2024-08-02 ASSESSMENT — PAIN DESCRIPTION - LOCATION: LOCATION: CHEST

## 2024-08-02 ASSESSMENT — PAIN DESCRIPTION - ORIENTATION: ORIENTATION: RIGHT

## 2024-08-02 NOTE — ANESTHESIA PROCEDURE NOTES
Airway  Date/Time: 8/2/2024 8:22 AM  Urgency: elective    Airway not difficult    Staffing  Performed: ABIDA   Authorized by: Albert Salguero MD    Performed by: LUIS Potter  Patient location during procedure: OR    Indications and Patient Condition  Indications for airway management: anesthesia  Spontaneous Ventilation: absent  Sedation level: deep  Preoxygenated: yes  Patient position: sniffing  Mask difficulty assessment: 1 - vent by mask    Final Airway Details  Final airway type: endotracheal airway      Successful airway: ETT - double lumen left  Cuffed: yes   Successful intubation technique: video laryngoscopy  Facilitating devices/methods: intubating stylet and cricoid pressure  Endotracheal tube insertion site: oral  Blade: Elsie  Blade size: #3  ETT DL size (fr): 37  Cormack-Lehane Classification: grade I - full view of glottis  Placement verified by: chest auscultation, bronchoscopy and capnometry   Measured from: teeth  ETT to teeth (cm): 30  Number of attempts at approach: 1

## 2024-08-02 NOTE — BRIEF OP NOTE
Date: 2024  OR Location: [facility] OR    Name: Maribel Lakhani, : 1960, Age: 64 y.o., MRN: [id number], Sex: female    Diagnosis  Pre-op Diagnosis      * Myxoma of heart ([facility]HS-HCC) [D15.1] Post-op Diagnosis     * Myxoma of heart ([facility]HS-HCC) [D15.1]     Procedures  minimally invasive myxoma excision  87184 - AL EXC INTRACARDIAC TUMOR RESCJ CARDIOPULMONARY BYP    Mini Thoracotomy.  Excision of left Atrial Myxoma.  Right femoral cutdown.  Right Femoral and Atrial Cannulation  Direct repair of right femoral artery.    Chest Tubes/Drains:  1right pleural.       Temporary Pacing Wires:  Ventricular pacing wires.    -Settings:   -Underlying Rhythm:    Permanent pacer/ICD: No   -Preoperative settings:    -Intra-op/ Postoperative settings:    Sternotomy performed by: N/A    Conduit Harvested by: N/A    Sternal Wires placed by: N/A      Arm/Leg/Groin Closure/Cutdown performed by:  right femoral leg closure- Elio RNFA     Cardio Pulmonary Bypass Time: 92 min  Cross-clamp Time: 55 min  Circulatory Arrest: No Time:     Is patient candidate for Emergency Re-sternotomy? No   -If yes, POD #10 is -    Surgeons      * Rosaura Morales - Primary    Resident/Fellow/Other Assistant:  Surgeons and Role:     * Chuck Billy MD - Assisting  . Dr Gupta, Carmel ASHA , Perri ASHA , Elio RNFA     Procedure Summary  Anesthesia: General  ASA: IV  Anesthesia Staff: Anesthesiologist: Albert Salguero MD  C-AA: LUIS Potter  Perfusionist: Juvenal Chavez  ABIDA: Jose Polanco  Estimated Blood Loss: 250mL  Intra-op Medications: Administrations occurring from 0810 to 1330 on 24:  * No intraprocedure medications in log *           Anesthesia Record               Intraprocedure I/O Totals          Intake    Tranexamic Acid 0.00 mL    The total shown is the total volume documented since Anesthesia Start was filed.    perfusion prime builder 710.00 mL    Total Intake 710 mL       Output    Urine 210 mL     Total Output 210 mL       Net    Net Volume 500 mL          Specimen:   ID Type Source Tests Collected by Time   1 : MYXOMA Tissue HEART ATRIAL MYXOMA SURGICAL PATHOLOGY EXAM Rosaura Morales MD 8/2/2024 0803        Staff:   Circulator: Areli Yoder Person: Rowena Lynch Scrub: Negar          Findings: same    Complications:  None; patient tolerated the procedure well.     Disposition: ICU - intubated and hemodynamically stable.  Condition: stable  Specimens Collected:   ID Type Source Tests Collected by Time   1 : MYXOMA Tissue HEART ATRIAL MYXOMA SURGICAL PATHOLOGY EXAM Rosaura Morales MD 8/2/2024 0803     Attending Attestation: I was present for the entire procedure.    Rosaura Morales  Phone Number: [phone]

## 2024-08-02 NOTE — H&P
CTICU History & Physical    Subjective   HPI:  63 y/o female with PMHx of anxiety, depression, lumbar spondylosis, insomnia, high cholesterol, myxoma of the heart, and malignant neoplasm of the transverse colon. Admitted to CTICU 8/2 s/p minimally invasive excision of myxoma.     Cardiac Testing:   [facility] (7/26/2024):  Awaiting final report     TTE (7/15/2024):  CONCLUSIONS:   1. The left ventricular systolic function is normal, with a Santoyo's biplane calculated ejection fraction of 65%.   2. There is normal right ventricular global systolic function.   3. The left atrium is mildly dilated.   4. Large mobile, penduculated mass seen attached to the atrial aspect of the anterior leaflet of the mitral valve measuring 6.3 x 2.4 cm, resulting in moderate to severe obstruction fo the mitral valve flow with mild mitral regurgitation. 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   5. Moderate to severe mitral valve stenosis.      Procedure/Surgeon: Mini thoracotomy, excision of left atrial myxoma, right femoral cutdown, right femoral and atrial cannulation, direct repair of right femoral artery/Dr. Morales   Frontliner/Anesthesia: Dr. Noemy MD   Out of OR Time (document on ventilator card): 1300     OR Course/Issues: None      CPB time: 92min  Cross clamp time: 55min  Circ arrest time: None   Echo Pre/Post: Normal BiV   Chest Tubes/Drains: 1 RPL   Temporary wires location/setting: Ventricular epicardial pacing wires       Fluids  Crystalloid: 2L  Colloid: 0  Cellsaver: 460mL  Products: None  EBL: 250mL  UOP: 210mL      Anesthesia  Intubation: Airway not diffcult, VL Mac size #3, ETT DL size 37fr, grade I, and difficulty assessment (1)  Intravenous Access: Right internal jugular CVC, left brachial a-line, and peripheral IVs  AICD: None  PPM: None   Regional anesthesia: Just field block   Benzodiazepine dose/last administration: 2mg midazolam total  Opioid dose/last administration: 750mcg fentanyl total  NMB dose/last  administration: 140mg rocuronium total  TOF/ reversal given: None   Antibiotic time: Cefazolin 2g @ 0845  Temperature on admission to ICU: 36.2C    Past Medical History:   Diagnosis Date    Abnormal finding on mammography 2023    Adhesive capsulitis of left shoulder 2019    Adhesive capsulitis of left shoulder    Adhesive capsulitis of right shoulder 2015    Adhesive capsulitis of right shoulder    Anxiety and depression 2023    # Hx Decreased moods c/w major depression and increased anxiety -- settled down.  stable off all SSRIs.    Bitten by cat, initial encounter 2015    Cat bite    Colon cancer (Multi)     Dry skin dermatitis 2023    Fatigue 2023    Hemangioma of skin and subcutaneous tissue 2022    Hemorrhoidal skin tag 2023    Hyperlipidemia     Melanocytic nevi of trunk 2022    Melanocytic nevi of unspecified lower limb, including hip 2022    Melanocytic nevi of unspecified part of face 2022    Melanocytic nevi of unspecified upper limb, including shoulder 2022    Neoplasm of uncertain behavior of skin 2022    Other conditions influencing health status     Menstruation    Other melanin hyperpigmentation 2022    Other seborrheic keratosis 2022    Other shoulder lesions, unspecified shoulder 2015    Rotator cuff tendonitis    Other viral warts 2022    Pain in left wrist 2021    Left wrist pain    Pain in right shoulder 2016    Right shoulder pain    Personal history of other complications of pregnancy, childbirth and the puerperium     History of spontaneous     Personal history of other diseases of the circulatory system 2014    History of varicose veins    Personal history of other diseases of the female genital tract     Vaginal delivery    Personal history of other infectious and parasitic diseases     History of varicella    Personal history of other specified conditions  05/05/2014    History of urinary urgency    Petechiae 08/29/2023    Primary focal hyperhidrosis, 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 Passive rom exercises demonstrated. No longer using tramadol.    Unspecified disorder of nose and nasal sinuses 05/05/2014    Sinus problem    Unspecified visual loss 05/05/2014    Vision problems    Xerosis cutis 08/31/2022     Past Surgical History:   Procedure Laterality Date    CARDIAC CATHETERIZATION N/A 7/26/2024    Procedure: Left Heart Cath;  Surgeon: Dareil Dumont MD;  Location: [facility] Cardiac Cath Lab;  Service: Cardiovascular;  Laterality: N/A;    DILATION AND CURETTAGE OF UTERUS  05/05/2014    Dilation And Curettage    FOOT SURGERY  03/11/2014    Foot Surgery    OTHER SURGICAL HISTORY  03/11/2014    Oral Surgery     Medications Prior to Admission   Medication Sig Dispense Refill Last Dose    chlorhexidine (Hibiclens) 4 % external liquid Apply topically 2 times a day for 5 days. 473 mL 0 8/2/2024    chlorhexidine (Peridex) 0.12 % solution Swish and spit 15 mL night before surgery and morning of surgery 473 mL 0 8/2/2024    diclofenac (Voltaren) 75 mg EC tablet Take 1 tablet (75 mg) by mouth 2 times a day as needed (pain). Do not crush, chew, or split. 60 tablet 11     ezetimibe (Zetia) 10 mg tablet Take 1 tablet (10 mg) by mouth once daily. 90 tablet 3     mv-mn/folic ac/calcium/vit K1 (WOMEN'S 50 PLUS MULTIVITAMIN ORAL) Take 1 tablet by mouth once daily.       traZODone (Desyrel) 100 mg tablet Take 1 tablet (100 mg) by mouth once daily. 90 tablet 3     venlafaxine XR (Effexor-XR) 37.5 mg 24 hr capsule Take 1 capsule (37.5 mg) by mouth once daily. Do not crush or chew. 30 capsule 11      Adhesive  tape-silicones, Ampicillin, Cefdinir, and [id number] antimigraine agents  Social History     Tobacco Use    Smoking status: Former     Current packs/day: 0.00     Average packs/day: 0.5 packs/day for 15.0 years (7.5 ttl pk-yrs)     Types: Cigarettes     Quit date: 7/15/2024     Years since quittin.0     Passive exposure: Past    Smokeless tobacco: Never   Vaping Use    Vaping status: Never Used   Substance Use Topics    Alcohol use: Yes     Alcohol/week: 4.0 standard drinks of alcohol     Types: 4 Glasses of wine per week    Drug use: Never     Family History   Problem Relation Name Age of Onset    Lupus Mother      Sjogren's syndrome Mother      Fibromyalgia Mother      Cancer Father JASON Mercado     Hearing loss Father JASON Mercado     Cancer Maternal Grandmother Catia Santa        Review of Systems:  Unable to assess. Patient is intubated and sedated.     Objective   Vitals:  Most Recent:  Vitals:    24 0556   BP: 99/62   Pulse: 65   Resp: 18   Temp: 36.6 °C (97.9 °F)   SpO2: 98%       24hr Min/Max:  Temp  Min: 36.6 °C (97.9 °F)  Max: 36.6 °C (97.9 °F)  Pulse  Min: 65  Max: 65  BP  Min: 99/62  Max: 99/62  Resp  Min: 18  Max: 18  SpO2  Min: 98 %  Max: 98 %    I/O:  No intake/output data recorded.    LDA:  CVC 24 Double lumen Right Internal jugular (Active)   Placement Date/Time: 24 (c) 0848   Hand Hygiene Performed Prior to CVC Insertion: Yes  Site Prep: Chlorhexidine   Site Prep Agent has Completely Dried Before Insertion: Yes  All 5 Sterile Barriers Used (Gloves, Gown, Cap, Mask, Large Sterile Yolette...   Number of days: 0       Arterial Line 24 Left Brachial (Active)   Placement Date/Time: 24 (c) 0863   Size: 20 G  Orientation: Left  Location: Brachial  Securement Method: Transparent dressing  Patient Tolerance: Tolerated well   Number of days: 0       ETT  37 Fr (Active)   Placement Date/Time: 24 (c) 4802   Mask Ventilation: Vent by mask  Technique: Video  laryngoscopy  ETT Type: ETT - double lumen left  Double Lumen Tube Size: 37 Fr  Cuffed: Yes  Laryngoscope: Elsie  Blade Size: 3  Location: Oral  Grade View: ...   Number of days: 0       Urethral Catheter Temperature probe 14 Fr. (Active)   Placement Date/Time: 08/02/24 0822   Placed by: JASON ESPINOZA  Hand Hygiene Completed: Yes  Catheter Type: Temperature probe  Tube Size (Fr.): 14 Fr.  Catheter Balloon Size: 10 mL  Urine Returned: Yes   Number of days: 0       Physical Exam:   - CONSTITUTION: Unable to assess. Patient is intubated and sedated.   - NEUROLOGIC: Unable to assess. Patient is intubated and sedated.   - CARDIOVASCULAR: NSR with rate in 80s. Hypertensive with MAP in 120s. Ventricular epicardial wires set VVI @ 50.   - RESPIRATORY: Intubated on AC/VC. RPL chest tube with serosanguinous output with air leak noted.   - GI: Abdomen soft and nondistended.   - : Daugherty in place with clear yi urine.   - EXTREMITIES: Warm upper extremities and left lower extremity. Right lower extremity slightly cool. Radial pulses +1 and DP pulses +1.   - SKIN: Warm. All wounds clean and dressed appropriately.   - PSYCHIATRIC: Unable to assess. Patient is intubated and sedated.     Lab Review:  Results from last 7 days   Lab Units 07/31/24  1459   WBC AUTO x10*3/uL 6.7   HEMOGLOBIN g/dL 12.2   HEMATOCRIT % 36.3   PLATELETS AUTO x10*3/uL 217     Results from last 7 days   Lab Units 07/31/24  1459   SODIUM mmol/L 139   POTASSIUM mmol/L 4.4   CHLORIDE mmol/L 103   CO2 mmol/L 25   BUN mg/dL 17   CREATININE mg/dL 0.61   CALCIUM mg/dL 9.5   PROTEIN TOTAL g/dL 6.9   BILIRUBIN TOTAL mg/dL 0.4   ALK PHOS U/L 63   ALT U/L 15   AST U/L 20   GLUCOSE mg/dL 73*         Results from last 7 days   Lab Units 08/02/24  1152   POCT PH, ARTERIAL pH 7.36*   POCT PCO2, ARTERIAL mm Hg 43*   POCT PO2, ARTERIAL mm Hg 179*   POCT HCO3 CALCULATED, ARTERIAL mmol/L 24.3   POCT BASE EXCESS, ARTERIAL mmol/L -1.2       Most recent labs and imaging  reviewed.    Daily Risk Screen  Intubated: Yes, for mechanical ventilatory support   Central line: Yes, for parenteral medication administration   Daugherty: Yes, for I/O monitoring in critically ill     Assessment/Plan     Assessment:  65 y/o female with PMHx of anxiety, depression, lumbar spondylosis, insomnia, high cholesterol, myxoma of the heart, and malignant neoplasm of the transverse colon. Admitted to CTICU 8/2 s/p minimally invasive excision of myxoma.      Plan:  NEURO: PMHx of anxiety and depression. Patient is intubated and sedated on propofol infusion. Acute post operative pain.   -->  - Serial neuro and pain assessments   - Continue propofol until NMB reversal, then daily sedation vacation at minimum   - NMB reversal when normothermic and hemodynamically stable.    - Scheduled Tylenol   - PRN oxycodone  - PRN dilaudid for pain   - PT Consult, OOB to chair as tolerated, chair position if not tolerated   - CAM ICU score qshift  - Sleep/wake cycle hygiene  - Hold home diclofenac, trazadone and effexor-XR      CV:  Patient has a history of HLD and myxoma of the heart. Admitted to CTICU on 8/2, s/p excision of left atrial myxoma by Dr. Morales. Post echo demonstrates normal BiV function. V epicardial wires set VVi @ 50. NSR with rate in 80s. Hypertensive with MAP in 120s. Nitroglycerin gtt started. -->  - Maintain goal MAP 70-90  - ABG and VBG as needed   - Volume resuscitate as clinically indicated  - Maintain epicardial wires set VVI @ 50     PULM: No significant PMHx. Currently intubated on ventilator. 1 RPL chest tube with serosanguinous output and air leak. -->  - F/u post op CXR  - Once reversed, wean ventilator settings towards CPAP & extubation   - Wean FiO2 maintaining SpO2 >92%.   - IS q1h and OOB to chair when extubated  - Chest tubes to wall suction.    GI: No significant PMHx. OG in place. -->  - Continue PPI until extubated  - NPO, will perform bedside swallow eval post extubation   - Colace/senna  BID and miralax BID  - Hold home zetia      : CSA-JHON Risk Score low. No history of renal disease, baseline creatinine 0.61. Creatinine stable post-op. Messer in place and making adequate UOP. -->  - Continue messer catheter for strict I/Os.  - Goal UOP 0.5ml/kg/hr  - RFP as clinically indicated  - Replete electrolytes per CTICU protocol     ENDO: No significant PMHx. A1c: 4.9 (7/31/2024). -->  - Maintain BG <180  - Insulin per CTICU protocol     HEME:  Acute blood loss anemia and thrombocytopenia.-->    - Monitor drain output volume and characteristics  - CBC, coags, and fibrinogen as needed   - SQH tomorrow   - SCDs for DVT prophylaxis.  - Last type and screen: 8/2/2024     ID: Afebrile, no current indications of infection. MRSA negative.-->  - Trend temp q4h  - Periop cefazolin x 48hrs     Skin:  No active skin issues.  - preventative Mepilex dressings in place on sacrum and heels  - change preventative Mepilex weekly or more frequently as indicated (when moist/soiled)   - every shift skin assessment per nursing and weekly ICU skin rounds  - moisture barrier to be applied with hao care  - active skin problems addressed with nursing on daily rounds     Proph:  SCDs  PPI     G:  Line  Right IJ MAC w Minimac placed 8/2/2024  Left brachial a-line placed 8/2/2024  Messer placed 8/2/2024    F: Family: will update at bedside postoperatively.     A,B,C,D,E,F,G: reviewed    I personally spent 60 minutes of critical care time directly and personally managing the patient exclusive of separately billable procedures.     Dispo: CTICU care for now.    CTICU TEAM PHONE [phone]

## 2024-08-02 NOTE — ANESTHESIA POSTPROCEDURE EVALUATION
Patient: Maribel Lakhani    Procedure Summary       Date: 08/02/24 Room / Location: [facility] OR 18 / [facility]    Anesthesia Start: 0754 Anesthesia Stop: 1321    Procedure: minimally invasive myxoma excision Diagnosis:       Myxoma of heart (HHS-HCC)      (Myxoma of heart (HHS-HCC) [D15.1])    Surgeons: Rosaura Morales MD Responsible Provider: Albert Salguero MD    Anesthesia Type: general ASA Status: 4            Anesthesia Type: general    Vitals Value Taken Time   BP  08/02/24 1823   Temp 37.5 °C (99.5 °F) 08/02/24 1800   Pulse 73 08/02/24 1822   Resp 9 08/02/24 1822   SpO2 98 % 08/02/24 1800   Vitals shown include unfiled device data.        No notable events documented.

## 2024-08-02 NOTE — NURSING NOTE
All IV medicine titration done from 1400 till 1530 were performed and done with verbal order and instruction at bedside from MOY Roman

## 2024-08-02 NOTE — ANESTHESIA PROCEDURE NOTES
Central Venous Line:    Date/Time: 8/2/2024 8:42 AM    A central venous line was placed in the OR for the following indication(s): central venous access and CVP monitoring.  Staffing  Performed: [facility]   Authorized by: Albert Salguero MD    Performed by: LUIS Potter    Sterility preparation included the following: provider hand hygiene performed prior to central venous catheter insertion, all 5 sterile barriers used (gloves, gown, cap, mask, large sterile drape) during central venous catheter insertion, antiseptic used during central venous catheter insertion and skin prep agent completely dried prior to procedure.  Medical reason for not performing maximal sterile barrier technique: no  The patient was placed in Trendelenburg position.    Right internal jugular vein was prepped.    The site was prepped with Chlorhexidine.  Size: 9 Fr (8 Fr)   Length: 11.5  Catheter type: introducer   Number of Lumens: double lumen    This catheter was not an oximetric catheter.    During the procedure, the following specific steps were taken: target vein identified, needle advanced into vein and blood aspirated and guidewire advanced into vein.  Seldinger technique used.  Procedure performed using ultrasound guidance.  Sterile gel and probe cover used in ultrasound-guided central venous catheter insertion.    Intravenous verification was obtained by ultrasound, venous blood return and manometry.      Post insertion care included: all ports aspirated, all ports flushed easily, guidewire removed intact, Biopatch applied, line sutured in place and dressing applied.    During the procedure the patient experienced: patient tolerated procedure well with no complications.           images stored in chart

## 2024-08-02 NOTE — ANESTHESIA PREPROCEDURE EVALUATION
Patient: Maribel Lakhani    Procedure Information       Date/Time: 24 0715    Procedure: minimally invasive myxoma excision    Location: [facility] OR 18 / [facility] OR    Surgeons: Rosaura Morales MD            Relevant Problems   Anesthesia  Past Surgical History:  2024: CARDIAC CATHETERIZATION; N/A      Comment:  Procedure: Left Heart Cath;  Surgeon: Dariel Dumont MD;  Location: [facility] Cardiac Cath Lab;  Service:                Cardiovascular;  Laterality: N/A;  2014: DILATION AND CURETTAGE OF UTERUS      Comment:  Dilation And Curettage  2014: FOOT SURGERY      Comment:  Foot Surgery  2014: OTHER SURGICAL HISTORY      Comment:  Oral Surgery        Cardiac  TTE 24:    CONCLUSIONS:   1. The left ventricular systolic function is normal, with a Santoyo's biplane calculated ejection fraction of 65%.   2. There is normal right ventricular global systolic function.   3. The left atrium is mildly dilated.   4. Large mobile, penduculated mass seen attached to the atrial aspect of the anterior leaflet of the mitral valve measuring 6.3 x 2.4 cm, resulting in moderate to severe obstruction fo the mitral valve flow with mild mitral regurgitation. 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     (+) High cholesterol      Pulmonary  Social History    Tobacco Use      Smoking status: Former        Packs/day: 0.00        Years: 0.5 packs/day for 15.0 years (7.5 ttl pk-yrs)        Types: Cigarettes        Quit date: 7/15/2024        Years since quittin.0        Passive exposure: Past      Smokeless tobacco: Never    Chest CT 2024:    IMPRESSION:  A large filling defect seen in the left atrium which may represent  myxoma versus thrombus. Echocardiogram advised.      No features of metastatic disease to the chest. Reported history of  colonic mass. Stool obscures evaluation of the colon. Slight hepatic  steatosis. Senescent changes detected.        Neuro   (+)  Anxiety   (+) Current moderate episode of major depressive disorder without prior episode (Multi)      GI   (+) Malignant neoplasm of transverse colon (Multi)      Liver   (+) Malignant neoplasm of transverse colon (Multi)      Musculoskeletal   (+) Lumbar spondylosis       Clinical information reviewed:     Meds               Past Medical History:   Diagnosis Date    Abnormal finding on mammography 2023    Adhesive capsulitis of left shoulder 2019    Adhesive capsulitis of left shoulder    Adhesive capsulitis of right shoulder 2015    Adhesive capsulitis of right shoulder    Anxiety and depression 2023    # Hx Decreased moods c/w major depression and increased anxiety -- settled down.  stable off all SSRIs.    Bitten by cat, initial encounter 2015    Cat bite    Colon cancer (Multi)     Dry skin dermatitis 2023    Fatigue 2023    Hemangioma of skin and subcutaneous tissue 2022    Hemorrhoidal skin tag 2023    Hyperlipidemia     Melanocytic nevi of trunk 2022    Melanocytic nevi of unspecified lower limb, including hip 2022    Melanocytic nevi of unspecified part of face 2022    Melanocytic nevi of unspecified upper limb, including shoulder 2022    Neoplasm of uncertain behavior of skin 2022    Other conditions influencing health status     Menstruation    Other melanin hyperpigmentation 2022    Other seborrheic keratosis 2022    Other shoulder lesions, unspecified shoulder 2015    Rotator cuff tendonitis    Other viral warts 2022    Pain in left wrist 2021    Left wrist pain    Pain in right shoulder 2016    Right shoulder pain    Personal history of other complications of pregnancy, childbirth and the puerperium     History of spontaneous     Personal history of other diseases of the circulatory system 2014    History of varicose veins    Personal history of other diseases of the  female genital tract     Vaginal delivery    Personal history of other infectious and parasitic diseases     History of varicella    Personal history of other specified conditions 05/05/2014    History of urinary urgency    Petechiae 08/29/2023    Primary focal hyperhidrosis, 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 Passive rom exercises demonstrated. No longer using tramadol.    Unspecified disorder of nose and nasal sinuses 05/05/2014    Sinus problem    Unspecified visual loss 05/05/2014    Vision problems    Xerosis cutis 08/31/2022         NPO Detail:  No data recorded     Physical Exam    Airway  Mallampati: I  TM distance: >3 FB  Neck ROM: full     Cardiovascular   Rhythm: regular  Rate: normal     Dental    Pulmonary - normal exam  Breath sounds clear to auscultation     Abdominal            Anesthesia Plan    History of general anesthesia?: yes  History of complications of general anesthesia?: no    ASA 4     general and regional     The patient is not a current smoker.  Patient was previously instructed to abstain from smoking on day of procedure.  Patient did not smoke on day of procedure.    intravenous and inhalational induction   Postoperative administration of opioids is intended.  Anesthetic plan and risks discussed with patient.  Use of blood products discussed with patient who.    Plan discussed with CAA.

## 2024-08-02 NOTE — ANESTHESIA PROCEDURE NOTES
Peripheral IV  Date/Time: 8/2/2024 8:20 AM      Placement  Needle size: 14 G  Laterality: right  Location: hand  Local anesthetic: none  Site prep: alcohol  Technique: anatomical landmarks  Attempts: 1

## 2024-08-02 NOTE — ANESTHESIA PROCEDURE NOTES
Arterial Line:    Date/Time: 8/2/2024 8:11 AM    Staffing  Performed: [facility]   Authorized by: Albert Salguero MD    Performed by: LUIS Potter    An arterial line was placed. Procedure performed using ultrasound guidance.in the OR for the following indication(s): continuous blood pressure monitoring.    A 20 gauge (size), 12 cm (length), Angiocath (type) catheter was placed into the Left brachial artery, secured by Tegaderm,   Seldinger technique used.  Events:  patient tolerated procedure well with no complications.

## 2024-08-03 ENCOUNTER — APPOINTMENT (OUTPATIENT)
Dept: RADIOLOGY | Facility: HOSPITAL | Age: 64
End: 2024-08-03
Payer: COMMERCIAL

## 2024-08-03 LAB
ALBUMIN SERPL BCP-MCNC: 3.2 G/DL (ref 3.4–5)
ANION GAP BLDA CALCULATED.4IONS-SCNC: 11 MMO/L (ref 10–25)
ANION GAP SERPL CALC-SCNC: 12 MMOL/L (ref 10–20)
BASE EXCESS BLDA CALC-SCNC: 0.5 MMOL/L (ref -2–3)
BLOOD EXPIRATION DATE: NORMAL
BODY TEMPERATURE: 37 DEGREES CELSIUS
BUN SERPL-MCNC: 7 MG/DL (ref 6–23)
CA-I BLD-SCNC: 1.16 MMOL/L (ref 1.1–1.33)
CA-I BLDA-SCNC: 1.21 MMOL/L (ref 1.1–1.33)
CALCIUM SERPL-MCNC: 7.9 MG/DL (ref 8.6–10.6)
CHLORIDE BLDA-SCNC: 105 MMOL/L (ref 98–107)
CHLORIDE SERPL-SCNC: 107 MMOL/L (ref 98–107)
CO2 SERPL-SCNC: 26 MMOL/L (ref 21–32)
CREAT SERPL-MCNC: 0.45 MG/DL (ref 0.5–1.05)
DISPENSE STATUS: NORMAL
EGFRCR SERPLBLD CKD-EPI 2021: >90 ML/MIN/1.73M*2
ERYTHROCYTE [DISTWIDTH] IN BLOOD BY AUTOMATED COUNT: 13.9 % (ref 11.5–14.5)
GLUCOSE BLDA-MCNC: 117 MG/DL (ref 74–99)
GLUCOSE SERPL-MCNC: 115 MG/DL (ref 74–99)
HCO3 BLDA-SCNC: 26 MMOL/L (ref 22–26)
HCT VFR BLD AUTO: 30.4 % (ref 36–46)
HCT VFR BLD EST: 31 % (ref 36–46)
HGB BLD-MCNC: 9.9 G/DL (ref 12–16)
HGB BLDA-MCNC: 10.2 G/DL (ref 12–16)
INHALED O2 CONCENTRATION: 25 %
LACTATE BLDA-SCNC: 0.5 MMOL/L (ref 0.4–2)
MAGNESIUM SERPL-MCNC: 2.11 MG/DL (ref 1.6–2.4)
MCH RBC QN AUTO: 32 PG (ref 26–34)
MCHC RBC AUTO-ENTMCNC: 32.6 G/DL (ref 32–36)
MCV RBC AUTO: 98 FL (ref 80–100)
NRBC BLD-RTO: 0 /100 WBCS (ref 0–0)
OXYHGB MFR BLDA: 94.2 % (ref 94–98)
PCO2 BLDA: 45 MM HG (ref 38–42)
PH BLDA: 7.37 PH (ref 7.38–7.42)
PHOSPHATE SERPL-MCNC: 2.9 MG/DL (ref 2.5–4.9)
PLATELET # BLD AUTO: 126 X10*3/UL (ref 150–450)
PO2 BLDA: 77 MM HG (ref 85–95)
POTASSIUM BLDA-SCNC: 4 MMOL/L (ref 3.5–5.3)
POTASSIUM SERPL-SCNC: 3.6 MMOL/L (ref 3.5–5.3)
PRODUCT BLOOD TYPE: 6200
PRODUCT CODE: NORMAL
RBC # BLD AUTO: 3.09 X10*6/UL (ref 4–5.2)
SAO2 % BLDA: 97 % (ref 94–100)
SODIUM BLDA-SCNC: 138 MMOL/L (ref 136–145)
SODIUM SERPL-SCNC: 141 MMOL/L (ref 136–145)
UNIT ABO: NORMAL
UNIT NUMBER: NORMAL
UNIT RH: NORMAL
UNIT VOLUME: 350
WBC # BLD AUTO: 6.9 X10*3/UL (ref 4.4–11.3)
XM INTEP: NORMAL

## 2024-08-03 PROCEDURE — 2500000001 HC RX 250 WO HCPCS SELF ADMINISTERED DRUGS (ALT 637 FOR MEDICARE OP)

## 2024-08-03 PROCEDURE — 2500000005 HC RX 250 GENERAL PHARMACY W/O HCPCS

## 2024-08-03 PROCEDURE — 2500000004 HC RX 250 GENERAL PHARMACY W/ HCPCS (ALT 636 FOR OP/ED): Mod: JZ

## 2024-08-03 PROCEDURE — 84132 ASSAY OF SERUM POTASSIUM: CPT

## 2024-08-03 PROCEDURE — 2500000004 HC RX 250 GENERAL PHARMACY W/ HCPCS (ALT 636 FOR OP/ED)

## 2024-08-03 PROCEDURE — 85027 COMPLETE CBC AUTOMATED: CPT

## 2024-08-03 PROCEDURE — 82330 ASSAY OF CALCIUM: CPT

## 2024-08-03 PROCEDURE — C9113 INJ PANTOPRAZOLE SODIUM, VIA: HCPCS

## 2024-08-03 PROCEDURE — 71045 X-RAY EXAM CHEST 1 VIEW: CPT | Performed by: STUDENT IN AN ORGANIZED HEALTH CARE EDUCATION/TRAINING PROGRAM

## 2024-08-03 PROCEDURE — 1200000002 HC GENERAL ROOM WITH TELEMETRY DAILY

## 2024-08-03 PROCEDURE — 93010 ELECTROCARDIOGRAM REPORT: CPT | Performed by: INTERNAL MEDICINE

## 2024-08-03 PROCEDURE — 99232 SBSQ HOSP IP/OBS MODERATE 35: CPT

## 2024-08-03 PROCEDURE — 76942 ECHO GUIDE FOR BIOPSY: CPT | Performed by: STUDENT IN AN ORGANIZED HEALTH CARE EDUCATION/TRAINING PROGRAM

## 2024-08-03 PROCEDURE — 2500000001 HC RX 250 WO HCPCS SELF ADMINISTERED DRUGS (ALT 637 FOR MEDICARE OP): Performed by: NURSE PRACTITIONER

## 2024-08-03 PROCEDURE — 99223 1ST HOSP IP/OBS HIGH 75: CPT | Performed by: STUDENT IN AN ORGANIZED HEALTH CARE EDUCATION/TRAINING PROGRAM

## 2024-08-03 PROCEDURE — 99232 SBSQ HOSP IP/OBS MODERATE 35: CPT | Performed by: STUDENT IN AN ORGANIZED HEALTH CARE EDUCATION/TRAINING PROGRAM

## 2024-08-03 PROCEDURE — 2500000001 HC RX 250 WO HCPCS SELF ADMINISTERED DRUGS (ALT 637 FOR MEDICARE OP): Performed by: STUDENT IN AN ORGANIZED HEALTH CARE EDUCATION/TRAINING PROGRAM

## 2024-08-03 PROCEDURE — 37799 UNLISTED PX VASCULAR SURGERY: CPT

## 2024-08-03 PROCEDURE — 83735 ASSAY OF MAGNESIUM: CPT

## 2024-08-03 PROCEDURE — 71045 X-RAY EXAM CHEST 1 VIEW: CPT

## 2024-08-03 RX ORDER — METHOCARBAMOL 500 MG/1
500 TABLET, FILM COATED ORAL EVERY 8 HOURS PRN
Status: DISCONTINUED | OUTPATIENT
Start: 2024-08-03 | End: 2024-08-05

## 2024-08-03 RX ORDER — HEPARIN SODIUM 5000 [USP'U]/ML
5000 INJECTION, SOLUTION INTRAVENOUS; SUBCUTANEOUS EVERY 8 HOURS
Status: DISCONTINUED | OUTPATIENT
Start: 2024-08-03 | End: 2024-08-08 | Stop reason: HOSPADM

## 2024-08-03 RX ORDER — VENLAFAXINE HYDROCHLORIDE 37.5 MG/1
37.5 CAPSULE, EXTENDED RELEASE ORAL
Status: DISCONTINUED | OUTPATIENT
Start: 2024-08-03 | End: 2024-08-08 | Stop reason: HOSPADM

## 2024-08-03 RX ORDER — NAPROXEN SODIUM 220 MG/1
81 TABLET, FILM COATED ORAL DAILY
Status: DISCONTINUED | OUTPATIENT
Start: 2024-08-03 | End: 2024-08-08 | Stop reason: HOSPADM

## 2024-08-03 RX ORDER — MULTIVIT-MIN/IRON FUM/FOLIC AC 7.5 MG-4
1 TABLET ORAL DAILY
Status: DISCONTINUED | OUTPATIENT
Start: 2024-08-04 | End: 2024-08-08 | Stop reason: HOSPADM

## 2024-08-03 RX ORDER — METOPROLOL TARTRATE 25 MG/1
12.5 TABLET, FILM COATED ORAL 2 TIMES DAILY
Status: DISCONTINUED | OUTPATIENT
Start: 2024-08-03 | End: 2024-08-04

## 2024-08-03 RX ORDER — HYDROMORPHONE HYDROCHLORIDE 0.2 MG/ML
0.2 INJECTION INTRAMUSCULAR; INTRAVENOUS; SUBCUTANEOUS EVERY 4 HOURS PRN
Status: DISCONTINUED | OUTPATIENT
Start: 2024-08-03 | End: 2024-08-03

## 2024-08-03 RX ORDER — VENLAFAXINE 37.5 MG/1
37.5 TABLET ORAL
Status: DISCONTINUED | OUTPATIENT
Start: 2024-08-03 | End: 2024-08-03

## 2024-08-03 RX ADMIN — OXYCODONE HYDROCHLORIDE 10 MG: 5 TABLET ORAL at 13:38

## 2024-08-03 RX ADMIN — ACETAMINOPHEN 650 MG: 325 TABLET ORAL at 00:40

## 2024-08-03 RX ADMIN — CEFAZOLIN SODIUM 2 G: 2 INJECTION, SOLUTION INTRAVENOUS at 00:12

## 2024-08-03 RX ADMIN — CEFAZOLIN SODIUM 2 G: 2 INJECTION, SOLUTION INTRAVENOUS at 16:56

## 2024-08-03 RX ADMIN — OXYCODONE HYDROCHLORIDE 10 MG: 5 TABLET ORAL at 00:20

## 2024-08-03 RX ADMIN — SENNOSIDES AND DOCUSATE SODIUM 2 TABLET: 50; 8.6 TABLET ORAL at 07:39

## 2024-08-03 RX ADMIN — ACETAMINOPHEN 650 MG: 325 TABLET ORAL at 12:05

## 2024-08-03 RX ADMIN — Medication 2 L/MIN: at 12:53

## 2024-08-03 RX ADMIN — CEFAZOLIN SODIUM 2 G: 2 INJECTION, SOLUTION INTRAVENOUS at 07:39

## 2024-08-03 RX ADMIN — PANTOPRAZOLE SODIUM 40 MG: 40 INJECTION, POWDER, FOR SOLUTION INTRAVENOUS at 06:53

## 2024-08-03 RX ADMIN — HEPARIN SODIUM 5000 UNITS: 5000 INJECTION INTRAVENOUS; SUBCUTANEOUS at 16:56

## 2024-08-03 RX ADMIN — ONDANSETRON 4 MG: 2 INJECTION INTRAMUSCULAR; INTRAVENOUS at 07:13

## 2024-08-03 RX ADMIN — POTASSIUM CHLORIDE 40 MEQ: 29.8 INJECTION, SOLUTION INTRAVENOUS at 06:54

## 2024-08-03 RX ADMIN — METHOCARBAMOL 500 MG: 500 TABLET ORAL at 21:43

## 2024-08-03 RX ADMIN — ASPIRIN 81 MG 81 MG: 81 TABLET ORAL at 12:06

## 2024-08-03 RX ADMIN — OXYCODONE HYDROCHLORIDE 10 MG: 5 TABLET ORAL at 22:45

## 2024-08-03 RX ADMIN — METOPROLOL TARTRATE 12.5 MG: 25 TABLET, FILM COATED ORAL at 12:06

## 2024-08-03 RX ADMIN — OXYCODONE HYDROCHLORIDE 10 MG: 5 TABLET ORAL at 04:23

## 2024-08-03 RX ADMIN — ACETAMINOPHEN 650 MG: 325 TABLET ORAL at 07:39

## 2024-08-03 RX ADMIN — POLYETHYLENE GLYCOL 3350 17 G: 17 POWDER, FOR SOLUTION ORAL at 07:39

## 2024-08-03 RX ADMIN — TRAZODONE HYDROCHLORIDE 100 MG: 50 TABLET ORAL at 21:42

## 2024-08-03 RX ADMIN — OXYCODONE HYDROCHLORIDE 10 MG: 5 TABLET ORAL at 18:37

## 2024-08-03 RX ADMIN — ACETAMINOPHEN 650 MG: 325 TABLET ORAL at 18:37

## 2024-08-03 RX ADMIN — VENLAFAXINE 37.5 MG: 37.5 TABLET ORAL at 07:39

## 2024-08-03 ASSESSMENT — COGNITIVE AND FUNCTIONAL STATUS - GENERAL
DRESSING REGULAR UPPER BODY CLOTHING: A LITTLE
DAILY ACTIVITIY SCORE: 20
MOVING TO AND FROM BED TO CHAIR: A LITTLE
WALKING IN HOSPITAL ROOM: A LITTLE
HELP NEEDED FOR BATHING: A LITTLE
TURNING FROM BACK TO SIDE WHILE IN FLAT BAD: A LITTLE
CLIMB 3 TO 5 STEPS WITH RAILING: A LITTLE
CLIMB 3 TO 5 STEPS WITH RAILING: A LITTLE
STANDING UP FROM CHAIR USING ARMS: A LITTLE
MOBILITY SCORE: 19
TURNING FROM BACK TO SIDE WHILE IN FLAT BAD: A LITTLE
DRESSING REGULAR LOWER BODY CLOTHING: A LITTLE
DRESSING REGULAR LOWER BODY CLOTHING: A LITTLE
DRESSING REGULAR UPPER BODY CLOTHING: A LITTLE
MOBILITY SCORE: 19
STANDING UP FROM CHAIR USING ARMS: A LITTLE
HELP NEEDED FOR BATHING: A LITTLE
TOILETING: A LITTLE
TOILETING: A LITTLE
MOVING TO AND FROM BED TO CHAIR: A LITTLE
DAILY ACTIVITIY SCORE: 20
WALKING IN HOSPITAL ROOM: A LITTLE

## 2024-08-03 ASSESSMENT — PAIN - FUNCTIONAL ASSESSMENT
PAIN_FUNCTIONAL_ASSESSMENT: 0-10

## 2024-08-03 ASSESSMENT — PAIN SCALES - GENERAL
PAINLEVEL_OUTOF10: 0 - NO PAIN
PAINLEVEL_OUTOF10: 6
PAINLEVEL_OUTOF10: 10 - WORST POSSIBLE PAIN
PAINLEVEL_OUTOF10: 6
PAINLEVEL_OUTOF10: 9
PAINLEVEL_OUTOF10: 0 - NO PAIN
PAINLEVEL_OUTOF10: 9
PAINLEVEL_OUTOF10: 5 - MODERATE PAIN
PAINLEVEL_OUTOF10: 9
PAINLEVEL_OUTOF10: 0 - NO PAIN
PAINLEVEL_OUTOF10: 9

## 2024-08-03 ASSESSMENT — PAIN DESCRIPTION - LOCATION: LOCATION: CHEST

## 2024-08-03 ASSESSMENT — PAIN DESCRIPTION - ORIENTATION: ORIENTATION: MID

## 2024-08-03 NOTE — PROGRESS NOTES
"CARDIAC SURGERY DAILY PROGRESS NOTE    Maribel Lakhani is a 63 y/o female with PMHx of anxiety, depression, lumbar spondylosis, insomnia, high cholesterol, myxoma of the heart, and malignant neoplasm of the transverse colon, who presented to [facility] on 8/2/2024 for planned cardiac surgery.     Admitted to CTICU 8/2 s/p minimally invasive excision of myxoma.    OPERATION/PROCEDURE: 8/2/2024 with Rosaura Morales   1. Mini Thoracotomy.  2. Excision of left Atrial Myxoma.  3. Right femoral cutdown.  4. Right Femoral and Atrial Cannulation  5. Direct repair of right femoral artery.    CTICU Course: Uneventful   Transfer to LT3: 8/3/2024  =================    Interval History:   New out of ICU.   - NO acute events     SUBJECTIVE:  No complaints     Objective   BP 94/50 (BP Location: Right arm, Patient Position: Lying)   Pulse 67   Temp 37 °C (98.6 °F) (Temporal)   Resp 17   Ht 1.727 m (5' 8\")   Wt 67.8 kg (149 lb 7.6 oz)   SpO2 95%   BMI 22.73 kg/m²   0-10 (Numeric) Pain Score: 9  Critical-Care Pain Observation Score:  [3-5]    3 Day Weight Change: Unable to Calculate    Intake and Output    Intake/Output Summary (Last 24 hours) at 8/3/2024 1534  Last data filed at 8/3/2024 1335  Gross per 24 hour   Intake 1095.41 ml   Output 1445 ml   Net -349.59 ml       Physical Exam  Vitals and nursing note reviewed.   Constitutional:       General: She is not in acute distress.     Appearance: Normal appearance. She is normal weight. She is not ill-appearing.      Comments: Patient alert and awake sitting up in chair, in no acute distress.    HENT:      Head: Atraumatic.      Mouth/Throat:      Pharynx: Oropharynx is clear.   Eyes:      Conjunctiva/sclera: Conjunctivae normal.      Comments: Patient wearing glasses    Neck:      Comments: Trachea Midline   Cardiovascular:      Rate and Rhythm: Normal rate and regular rhythm.      Pulses: Normal pulses.      Heart sounds: No murmur heard.     No gallop.      " Comments: Tele: SR 70's to 80's  +2 Equal and even pulses in all extremities   V wires - VVI @ 50   Pulmonary:      Effort: Pulmonary effort is normal. No respiratory distress.      Breath sounds: Normal breath sounds. No wheezing.      Comments: Equal and even chest expansion; thorax symmetric.   Diminished breath sounds in bilateral bases   Good inspiratory effort on 2L NC     Rigth pleural CT in place; no air leak present (-20) wall suction   Abdominal:      General: Bowel sounds are normal. There is no distension.      Palpations: Abdomen is soft. There is no mass.      Tenderness: There is no abdominal tenderness.      Comments: Awaiting postop BM    Genitourinary:     Comments: Awaiting post messer removal void   Musculoskeletal:      Cervical back: Neck supple.      Right lower le+ Edema present.      Left lower le+ Edema present.      Comments: RHODES; 5/5 strength in all extremities   Ambulates with x1 assist    Skin:     General: Skin is warm and dry.      Capillary Refill: Capillary refill takes less than 2 seconds.      Coloration: Skin is not jaundiced or pale.      Findings: No bruising.      Comments: Right Thoracotomy site: well approximated, no s/s of infection or bleeding - SINTIA    Neurological:      General: No focal deficit present.      Mental Status: She is oriented to person, place, and time.   Psychiatric:         Mood and Affect: Mood normal.         Behavior: Behavior normal.       Medications  Scheduled medications  acetaminophen, 650 mg, oral, q6h  aspirin, 81 mg, oral, Daily  ceFAZolin, 2 g, intravenous, q8h  heparin (porcine), 5,000 Units, subcutaneous, q8h  metoprolol tartrate, 12.5 mg, oral, BID  [START ON 2024] multivitamin with minerals, 1 tablet, oral, Daily  polyethylene glycol, 17 g, oral, Daily  sennosides-docusate sodium, 2 tablet, oral, BID  traZODone, 100 mg, oral, Nightly  venlafaxine XR, 37.5 mg, oral, Daily with breakfast    Continuous medications   PRN  medications  PRN medications: dextrose **OR** glucagon, naloxone, [DISCONTINUED] ondansetron **OR** ondansetron, oxyCODONE, oxyCODONE, oxygen    LABS:  CMP:  Results from last 7 days   Lab Units 08/03/24 0323 08/02/24  1328 07/31/24  1459   SODIUM mmol/L 141 143 139   POTASSIUM mmol/L 3.6 4.1 4.4   CHLORIDE mmol/L 107 109* 103   CO2 mmol/L 26 25 25   ANION GAP mmol/L 12 13 15   BUN mg/dL 7 9 17   CREATININE mg/dL 0.45* 0.51 0.61   EGFR mL/min/1.73m*2 >90 >90 >90   MAGNESIUM mg/dL 2.11 3.33*  --    ALBUMIN g/dL 3.2* 3.2* 3.9   ALT U/L  --   --  15   AST U/L  --   --  20   BILIRUBIN TOTAL mg/dL  --   --  0.4     CBC:  Results from last 7 days   Lab Units 08/03/24 0323 08/02/24  1328 07/31/24  1459   WBC AUTO x10*3/uL 6.9 9.2 6.7   HEMOGLOBIN g/dL 9.9* 10.8* 12.2   HEMATOCRIT % 30.4* 33.0* 36.3   PLATELETS AUTO x10*3/uL 126* 128* 217   MCV fL 98 97 93     COAG:   Results from last 7 days   Lab Units 08/02/24  1328 07/31/24  1459   INR  1.2* 1.0     HEME/ENDO:  Results from last 7 days   Lab Units 07/31/24  1459   HEMOGLOBIN A1C % 4.9      CARDIAC:          IMAGING/ DIAGNOSTIC TESTING:  I have personally reviewed the following test result(s):        IMPRESSION & PLAN:  POD # 1 s/p Myxoma of the Heart with Dr. Morales   - Increase activity/ ambulation; PT/OT  - Encourage IS, C/DB; respiratory therapy; wean O2 as spike - on 2L NC   - Cardiac rehab referral   - Continue cardiac meds: ASA, BB  --- No indication for statin   - Pain and anticonstipation meds  -  1 right pleural Chest tube in place to -20cm suction; - Maintain chest tubes until output down/removal ok with surgeon; monitor daily 1v PCXR while chest tubes in place   - 2v CXR to be ordered once CT's removed   - Postop echo to be ordered once CT is removed   - CUT epicardial wires prior to discharge; per surgeon preference   - Tele until discharge  - Optimize nutrition and electrolytes    Rhythm  - Tele: SR 70's to 80's   - Continue BB: Metoprolol tartrate 12.5mg BID    - Adjust medications as tolerated    Acute Blood Loss Anemia   Recent Labs     08/03/24  0323 08/02/24  1328 07/31/24  1459 07/23/24  1115 02/20/24  1139 08/28/23  0804 02/16/23  0818   HGB 9.9* 10.8* 12.2 14.4 14.5 14.2 14.5   HCT 30.4* 33.0* 36.3 43.7 45.1 42.6 44.9   - MV, PO Iron x1mo  - Daily labs, transfuse as indicated    Thrombocytopenia  Recent Labs     08/03/24  0323 08/02/24  1328 07/31/24  1459 07/23/24  1115 02/20/24  1139 08/28/23  0804 02/16/23  0818   * 128* 217 365 239 241 230   - Etiology likely postop/CPB related  - Trend with daily CBCs    Volume/Electrolyte Status: Preop wt Weight: 67.8 kg (149 lb 7.6 oz)   Vitals:    08/02/24 0556   Weight: 67.8 kg (149 lb 7.6 oz)     - Weight: 67.8 kg   - Adjust diuresis for postop cardiac surgery hypervolemia  - Replete electrolytes for hypokalemia/hypomagnesemia  - Daily weights/strict I&Os  - Daily RFP     Hyperlipidemia: home meds: Zetia 10mg nightly   Lab Results   Component Value Date    CHOL 191 08/28/2023    HDL 64.3 08/28/2023    VLDL 14 08/28/2023    TRIG 71 08/28/2023   - continue home Zetia   - follow up lipid panel with PCP/ cardiologist for ongoing lipid management    Anxiety/Depression: Home meds: Trazodone 100mg daily; Effexor 37.5mg daily   -continue home medications  -sleep/ wake cycle hygiene  -control pain with PRN meds      Arthritis: Home Meds: Voltaren Topical Gel   - Continue home Voltaren PRN   - NO NSAIDs for 3 months s/p cardiac surgery   - PT/OT   - Hot/cold packs     Acute Postop Pain   - Acute Pain consulted, appreciate recs:   --- 8/3: R ARIS with catheter blocks placed    --- Ambit ball with Ropivacaine 0.2%/NaCl 0.9% 500mL, Rate 7 cc/hr bilaterally  --- Rest of the pain medication as per primary team     VTE Prophylaxis: SCDs/TEDs, ambulation, SQ heparin   Code Status: Full Code    Dispo  - PT/OT recs low intensity level of continued care; home   - Would benefit from homecare RN for cardiac surgery carepath  -  Anticipate discharge 2-3 days, pending diuresis, supp O2 wean, CT removal, postop imaging   - Will continue to assess discharge needs      CARRI Cardenas-CNP  Cardiac Surgery [facility]  [facility]  Team Phone [phone]    8/3/2024  3:34 PM

## 2024-08-03 NOTE — PROCEDURES
Peripheral Block    Patient location during procedure: pre-op  Start time: 8/3/2024 10:15 AM  End time: 8/3/2024 10:30 AM  Reason for block: at surgeon's request and post-op pain management  Staffing  Performed: resident   Authorized by: Pamella Alvarez MD    Performed by: Pamella Alvarez MD  Preanesthetic Checklist  Completed: patient identified, IV checked, site marked, risks and benefits discussed, surgical consent, monitors and equipment checked, pre-op evaluation and timeout performed   Timeout performed at: 8/3/2024 10:15 AM  Peripheral Block  Patient position: sitting  Prep: ChloraPrep  Patient monitoring: heart rate and continuous pulse ox  Block type: ARIS  Laterality: right  Injection technique: catheter  Guidance: ultrasound guided  Local infiltration: ropivacaine  Needle  Needle type: Tuohy   Needle gauge: 26 G  Needle length: 8 cm  Needle localization: ultrasound guidance     image stored in chart  Assessment  Injection assessment: negative aspiration for heme, no paresthesia on injection, incremental injection and local visualized surrounding nerve on ultrasound  Additional Notes  Erector spinae plane block: Informed consent obtained.  Risks, benefits, and alternatives discussed.  ASA monitors placed, and timeout performed.  Patient positioned, prepped with chlorhexidine, and draped with sterile towels.  Ultrasound guidance used to visualize the erector spine a muscle above the TP/costal junction at T6.  Good visualization of the needle throughout duration of the procedure.  Aspiration was negative.  15 cc of 0.5 percent ropivacaine injected with visualization of spread.  Catheters threaded and secured. Patient tolerated procedure well.    Timeout by CTICU nurse

## 2024-08-03 NOTE — PROGRESS NOTES
"CTICU Progress Note  Maribel Lakhani/[id number]    Admit Date: 8/2/2024  Hospital Length of Stay: 1   ICU Length of Stay: 14h   CT SURGEON:     SUBJECTIVE:   Patient slept overnight.  Doing well this AM, endorses pain.     MEDICATIONS  Infusions:  lactated Ringer's, Last Rate: Stopped (08/03/24 0000)  lactated Ringer's, Last Rate: 5 mL/hr (08/02/24 1800)  nitroglycerin, Last Rate: Stopped (08/02/24 1445)      Scheduled:  acetaminophen, 650 mg, q6h  ceFAZolin, 2 g, q8h  insulin lispro, 0-15 Units, After meals & nightly  pantoprazole, 40 mg, Daily before breakfast   Or  pantoprazole, 40 mg, Daily before breakfast  polyethylene glycol, 17 g, Daily  sennosides-docusate sodium, 2 tablet, BID  traZODone, 100 mg, Nightly      PRN:  calcium gluconate, 1 g, q6h PRN  calcium gluconate, 2 g, q6h PRN  dextrose, 25 g, q15 min PRN   Or  glucagon, 1 mg, q15 min PRN  HYDROmorphone, 0.2 mg, q15 min PRN  magnesium sulfate, 2 g, q6h PRN  magnesium sulfate, 4 g, q6h PRN  naloxone, 0.2 mg, q5 min PRN  ondansetron, 4 mg, q8h PRN   Or  ondansetron, 4 mg, q8h PRN  oxyCODONE, 10 mg, q4h PRN  oxyCODONE, 5 mg, q4h PRN  oxygen, , Continuous PRN - O2/gases  potassium chloride, 20 mEq, q6h PRN  potassium chloride, 40 mEq, q6h PRN        PHYSICAL EXAM:   Visit Vitals  BP 99/62   Pulse 66   Temp 37.7 °C (99.9 °F) (Temporal)   Resp 10   Ht 1.727 m (5' 8\")   Wt 67.8 kg (149 lb 7.6 oz)   SpO2 98%   BMI 22.73 kg/m²   OB Status Postmenopausal   Smoking Status Former   BSA 1.8 m²     Wt Readings from Last 5 Encounters:   08/02/24 67.8 kg (149 lb 7.6 oz)   07/31/24 67.5 kg (148 lb 12.8 oz)   07/26/24 67.4 kg (148 lb 9.4 oz)   07/19/24 67.4 kg (148 lb 9.6 oz)   07/17/24 67.1 kg (148 lb)     INTAKE/OUTPUT:  I/O last 3 completed shifts:  In: 3893.6 (57.4 mL/kg) [I.V.:307.1 (4.5 mL/kg); Blood:710; IV Piggyback:1869.5]  Out: 2620 (38.6 mL/kg) [Urine:2485 (1 mL/kg/hr); Chest Tube:135]  Weight: 67.8 kg        LDA:  CVC 08/02/24 Double lumen Right Internal jugular " (Active)   Placement Date/Time: 08/02/24 (c) 0842   Hand Hygiene Performed Prior to CVC Insertion: Yes  Site Prep: Chlorhexidine   Site Prep Agent has Completely Dried Before Insertion: Yes  All 5 Sterile Barriers Used (Gloves, Gown, Cap, Mask, Large Sterile Yolette...   Number of days: 0       Arterial Line 08/02/24 Left Brachial (Active)   Placement Date/Time: 08/02/24 (c) 0811   Size: 20 G  Orientation: Left  Location: Brachial  Securement Method: Transparent dressing  Patient Tolerance: Tolerated well   Number of days: 0       Urethral Catheter Temperature probe 14 Fr. (Active)   Placement Date/Time: 08/02/24 0822   Placed by: JASON ESPINOZA  Hand Hygiene Completed: Yes  Catheter Type: Temperature probe  Tube Size (Fr.): 14 Fr.  Catheter Balloon Size: 10 mL  Urine Returned: Yes   Number of days: 0       Chest Tube 1 Right Pleural 24 Fr (Active)   Placement Date/Time: 08/02/24 1214   Placed by: Dr. gaviria  Tube Number: 1  Chest Tube Orientation: Right  Chest Tube Location: Pleural  Chest Tube Drain Tube Size (Fr): 24 Fr  Chest Tube Drainage System: Dry seal chest drain   Number of days: 0       Physical Exam:   - CONSTITUTION: NAD, patient is pleasant, appears as stated age    - NEUROLOGIC: A&Ox4, no focal deficit  - CARDIOVASCULAR: NSR with rate in 80s. V epicardial wires set VVI @ 50.   - RESPIRATORY: Nonlabored breathing. RPL chest tube with serosanguinous output with air leak noted.   - GI: Abdomen soft and nondistended.   - : Daugherty in place with clear yi urine.   - EXTREMITIES: Warm upper extremities and left lower extremity. Right lower extremity slightly cool. Radial pulses +1 and DP pulses +1.   - SKIN: Warm. All wounds clean and dressed appropriately.   - PSYCHIATRIC: Calm, cooperative       Invasive Hemodynamics:    Most Recent Range Past 24hrs   BP (Art) 109/53 Arterial Line BP 1  Min: 97/55  Max: 262/259   MAP(Art) 74 mmHg Arterial Line MAP 1 (mmHg)   Min: 71 mmHg  Max: 260 mmHg     Daily Risk  Screen:  Central line will be removed   Messer will be removed           Assessment/Plan   Assessment:  65 y/o female with PMHx of anxiety, depression, lumbar spondylosis, insomnia, high cholesterol, myxoma of the heart, and malignant neoplasm of the transverse colon. Admitted to CTICU 8/2 s/p minimally invasive excision of myxoma.      Plan:  NEURO: PMHx of anxiety and depression. Acute post operative pain controlled.   -->  - Scheduled Tylenol   - PRN oxycodone  - PRN dilaudid for pain   - APS consulted for postoperative block   - PT Consult, OOB to chair as tolerated, chair position if not tolerated   - CAM ICU score qshift  - Sleep/wake cycle hygiene  - Restart home trazodone and effexor   - Hold home diclofenac       CV:  Patient has a history of HLD and myxoma of the heart. Admitted to CTICU on 8/2, s/p excision of left atrial myxoma by Dr. Morales. Post echo demonstrates normal BiV function. V epicardial wires set VVi @ 50. NSR with rate in 60s. -->  - Maintain goal MAP 70-90  - ABG and VBG as needed   - Volume resuscitate as clinically indicated  - Maintain epicardial wires set VVI @ 50     PULM: No significant PMHx. 1 RPL chest tube with serosanguinous output and air leak. -->  - F/u post op CXR  - Wean FiO2 maintaining SpO2 >92%.   - IS q1h and OOB to chair as able  - Chest tubes to wall suction, air leak will keep for now.      GI: No significant PMHx.  -->  - Regular adult diet   - Colace/senna BID and miralax BID  - Hold home zetia      : CSA-JHON Risk Score low. No history of renal disease, baseline creatinine 0.61. Creatinine stable post-op. Messer in place and making adequate UOP. -->  - Remove emsser today   - Goal UOP 0.5ml/kg/hr  - RFP as clinically indicated  - Replete electrolytes per CTICU protocol     ENDO: No significant PMHx. A1c: 4.9 (7/31/2024). -->  - Maintain BG <180  - Insulin per CTICU protocol     HEME:  Acute blood loss anemia and thrombocytopenia.  Hg stable at 10.8-->    - Monitor drain  output volume and characteristics  - CBC, coags, and fibrinogen as needed   - Start SQH for DVT prophylaxis    - SCDs for DVT prophylaxis.  - Last type and screen: 8/2/2024     ID: Afebrile, no current indications of infection. MRSA negative.-->  - Trend temp q4h  - Periop cefazolin x 48hrs     Skin:  No active skin issues.  - preventative Mepilex dressings in place on sacrum and heels  - change preventative Mepilex weekly or more frequently as indicated (when moist/soiled)   - every shift skin assessment per nursing and weekly ICU skin rounds  - moisture barrier to be applied with hao care  - active skin problems addressed with nursing on daily rounds     Proph:  SCDs  SQH     G:  Line  Right IJ MAC w Min[place]ac placed - remove today   Left brachial a-line placed 8/2/2024 - remove today   Daugherty placed 8/2/2024 - remove today      F: Family: will update at bedside postoperatively.     A,B,C,D,E,F,G: reviewed     Dispo: Transfer to T3 today.    CTICU TEAM PHONE [id number]    Patient seen and discussed with ICU attending Dr. Ted Griffin, DO   PGY4/CA3

## 2024-08-03 NOTE — CONSULTS
Consults  Maribel Lakhani is a 64 y.o. year old female patient who presents for minimally invasive excision of myxoma  with Dr. Morales. Acute Pain consulted for block for postoperative pain control.     Anticipated Postop Pain Issues -   Palliative: typically relieved with IV analgesics and regional local anesthetics  Provocative: typically with movement  Quality: typically burning and aching  Radiation: typically none  Severity: typically severe 8-10/10  Timing: typically constant    Past Medical History:   Diagnosis Date    Abnormal finding on mammography 08/29/2023    Adhesive capsulitis of left shoulder 01/25/2019    Adhesive capsulitis of left shoulder    Adhesive capsulitis of right shoulder 12/03/2015    Adhesive capsulitis of right shoulder    Anxiety and depression 08/29/2023    # Hx Decreased moods c/w major depression and increased anxiety -- settled down.  stable off all SSRIs.    Bitten by cat, initial encounter 05/14/2015    Cat bite    Colon cancer (Multi)     Dry skin dermatitis 08/29/2023    Fatigue 08/29/2023    Hemangioma of skin and subcutaneous tissue 08/31/2022    Hemorrhoidal skin tag 08/29/2023    Hyperlipidemia     Melanocytic nevi of trunk 08/31/2022    Melanocytic nevi of unspecified lower limb, including hip 08/31/2022    Melanocytic nevi of unspecified part of face 08/31/2022    Melanocytic nevi of unspecified upper limb, including shoulder 08/31/2022    Neoplasm of uncertain behavior of skin 08/31/2022    Other conditions influencing health status     Menstruation    Other melanin hyperpigmentation 08/31/2022    Other seborrheic keratosis 08/31/2022    Other shoulder lesions, unspecified shoulder 12/21/2015    Rotator cuff tendonitis    Other viral warts 08/31/2022    Pain in left wrist 08/18/2021    Left wrist pain    Pain in right shoulder 01/14/2016    Right shoulder pain    Personal history of other complications of pregnancy, childbirth and the puerperium     History of spontaneous      Personal history of other diseases of the circulatory system 2014    History of varicose veins    Personal history of other diseases of the female genital tract     Vaginal delivery    Personal history of other infectious and parasitic diseases     History of varicella    Personal history of other specified conditions 2014    History of urinary urgency    Petechiae 2023    Primary focal hyperhidrosis, soles 2022    Rash and other nonspecific skin eruption 2022    Scar condition and fibrosis of skin 2022    Seborrheic dermatitis 2023    Skin neoplasm 2023    Tendinitis of left rotator cuff 2023    # hx Left Rotator cuff tendinitis -- resolved with HEP and csi 18. Passive rom exercises demonstrated. No longer using tramadol.    Unspecified disorder of nose and nasal sinuses 2014    Sinus problem    Unspecified visual loss 2014    Vision problems    Xerosis cutis 2022        Past Surgical History:   Procedure Laterality Date    CARDIAC CATHETERIZATION N/A 2024    Procedure: Left Heart Cath;  Surgeon: Dariel Dumont MD;  Location: [facility];  Service: Cardiovascular;  Laterality: N/A;    DILATION AND CURETTAGE OF UTERUS  2014    Dilation And Curettage    FOOT SURGERY  2014    Foot Surgery    OTHER SURGICAL HISTORY  2014    Oral Surgery        Family History   Problem Relation Name Age of Onset    Lupus Mother      Sjogren's syndrome Mother      Fibromyalgia Mother      Cancer Father JASON Mercado     Hearing loss Father JASON Mercado     Cancer Maternal Grandmother Catia Santa         Social History     Socioeconomic History    Marital status:      Spouse name: Not on file    Number of children: Not on file    Years of education: Not on file    Highest education level: Not on file   Occupational History    Not on file   Tobacco Use    Smoking status: Former     Current  packs/day: 0.00     Average packs/day: 0.5 packs/day for 15.0 years (7.5 ttl pk-yrs)     Types: Cigarettes     Quit date: 7/15/2024     Years since quittin.0     Passive exposure: Past    Smokeless tobacco: Never   Vaping Use    Vaping status: Never Used   Substance and Sexual Activity    Alcohol use: Yes     Alcohol/week: 4.0 standard drinks of alcohol     Types: 4 Glasses of wine per week    Drug use: Never    Sexual activity: Not Currently   Other Topics Concern    Not on file   Social History Narrative    Not on file     Social Determinants of Health     Financial Resource Strain: Not on file   Food Insecurity: Not on file   Transportation Needs: Not on file   Physical Activity: Not on file   Stress: Not on file   Social Connections: Not on file   Intimate Partner Violence: Not on file   Housing Stability: Not on file        Allergies   Allergen Reactions    Adhesive Tape-Silicones Itching    Ampicillin Rash    Cefdinir Rash    Zetrewem-0-An1 Antimigraine Agents Anxiety         Review of Systems  Gen: No fatigue, anorexia, insomnia, fever.   Eyes: No vision loss, double vision, drainage, eye pain.   ENT: No pharyngitis, dry mouth, no hearing changes or ear discharge  Cardiac: No chest pain, palpitations, syncope, near syncope.   Pulmonary: No shortness of breath, cough, hemoptysis.   Heme/lymph: No swollen glands, fever, bleeding.   GI: No abdominal pain, change in bowel habits, melena, hematemesis, hematochezia, nausea, vomiting, diarrhea.   : No discharge, dysuria, frequency, urgency, hematuria.  Endo: No polyuria or weight loss.   Musculoskeletal: Negative for any pain or loss of ROM/weakness  Skin: No rashes or lesions  Neuro: Normal speech, no numbness or weakness. No gait difficulties  Review of systems is otherwise negative unless stated above or in history of present illness.    Physical Exam:  Constitutional:  no distress, alert and cooperative  Eyes: clear sclera  Head/Neck: No apparent injury,  trachea midline  Respiratory/Thorax: Patent airways, thorax symmetric, breathing comfortably  Cardiovascular: no pitting edema  Gastrointestinal: Nondistended  Musculoskeletal: ROM intact  Extremities: no clubbing  Neurological: alert, terrell x4  Psychological: Appropriate affect    Results for orders placed or performed during the hospital encounter of 08/02/24 (from the past 24 hour(s))   Calcium, Ionized   Result Value Ref Range    POCT Calcium, Ionized 1.17 1.1 - 1.33 mmol/L   Magnesium   Result Value Ref Range    Magnesium 3.33 (H) 1.60 - 2.40 mg/dL   Coagulation Screen   Result Value Ref Range    Protime 13.2 (H) 9.8 - 12.8 seconds    INR 1.2 (H) 0.9 - 1.1    aPTT 30 27 - 38 seconds   Fibrinogen   Result Value Ref Range    Fibrinogen 384 200 - 400 mg/dL   CBC   Result Value Ref Range    WBC 9.2 4.4 - 11.3 x10*3/uL    nRBC 0.0 0.0 - 0.0 /100 WBCs    RBC 3.41 (L) 4.00 - 5.20 x10*6/uL    Hemoglobin 10.8 (L) 12.0 - 16.0 g/dL    Hematocrit 33.0 (L) 36.0 - 46.0 %    MCV 97 80 - 100 fL    MCH 31.7 26.0 - 34.0 pg    MCHC 32.7 32.0 - 36.0 g/dL    RDW 13.9 11.5 - 14.5 %    Platelets 128 (L) 150 - 450 x10*3/uL   Renal Function Panel   Result Value Ref Range    Glucose 114 (H) 74 - 99 mg/dL    Sodium 143 136 - 145 mmol/L    Potassium 4.1 3.5 - 5.3 mmol/L    Chloride 109 (H) 98 - 107 mmol/L    Bicarbonate 25 21 - 32 mmol/L    Anion Gap 13 10 - 20 mmol/L    Urea Nitrogen 9 6 - 23 mg/dL    Creatinine 0.51 0.50 - 1.05 mg/dL    eGFR >90 >60 mL/min/1.73m*2    Calcium 8.5 (L) 8.6 - 10.6 mg/dL    Phosphorus 2.9 2.5 - 4.9 mg/dL    Albumin 3.2 (L) 3.4 - 5.0 g/dL   BLOOD GAS ARTERIAL FULL PANEL   Result Value Ref Range    POCT pH, Arterial 7.37 (L) 7.38 - 7.42 pH    POCT pCO2, Arterial 41 38 - 42 mm Hg    POCT pO2, Arterial 115 (H) 85 - 95 mm Hg    POCT SO2, Arterial 99 94 - 100 %    POCT Oxy Hemoglobin, Arterial 96.5 94.0 - 98.0 %    POCT Hematocrit Calculated, Arterial 35.0 (L) 36.0 - 46.0 %    POCT Sodium, Arterial 140 136 - 145  mmol/L    POCT Potassium, Arterial 4.1 3.5 - 5.3 mmol/L    POCT Chloride, Arterial 107 98 - 107 mmol/L    POCT Ionized Calcium, Arterial 1.18 1.10 - 1.33 mmol/L    POCT Glucose, Arterial 115 (H) 74 - 99 mg/dL    POCT Lactate, Arterial 1.0 0.4 - 2.0 mmol/L    POCT Base Excess, Arterial -1.5 -2.0 - 3.0 mmol/L    POCT HCO3 Calculated, Arterial 23.7 22.0 - 26.0 mmol/L    POCT Hemoglobin, Arterial 11.7 (L) 12.0 - 16.0 g/dL    POCT Anion Gap, Arterial 13 10 - 25 mmo/L    Patient Temperature 37.0 degrees Celsius    FiO2 50 %   BLOOD GAS ARTERIAL FULL PANEL   Result Value Ref Range    POCT pH, Arterial 7.33 (L) 7.38 - 7.42 pH    POCT pCO2, Arterial 44 (H) 38 - 42 mm Hg    POCT pO2, Arterial 126 (H) 85 - 95 mm Hg    POCT SO2, Arterial 100 94 - 100 %    POCT Oxy Hemoglobin, Arterial 97.8 94.0 - 98.0 %    POCT Hematocrit Calculated, Arterial 31.0 (L) 36.0 - 46.0 %    POCT Sodium, Arterial 140 136 - 145 mmol/L    POCT Potassium, Arterial 3.9 3.5 - 5.3 mmol/L    POCT Chloride, Arterial 109 (H) 98 - 107 mmol/L    POCT Ionized Calcium, Arterial 1.11 1.10 - 1.33 mmol/L    POCT Glucose, Arterial 141 (H) 74 - 99 mg/dL    POCT Lactate, Arterial 0.6 0.4 - 2.0 mmol/L    POCT Base Excess, Arterial -2.7 (L) -2.0 - 3.0 mmol/L    POCT HCO3 Calculated, Arterial 23.2 22.0 - 26.0 mmol/L    POCT Hemoglobin, Arterial 10.2 (L) 12.0 - 16.0 g/dL    POCT Anion Gap, Arterial 12 10 - 25 mmo/L    Patient Temperature 37.0 degrees Celsius    FiO2 40 %   POCT GLUCOSE   Result Value Ref Range    POCT Glucose 136 (H) 74 - 99 mg/dL   BLOOD GAS ARTERIAL FULL PANEL   Result Value Ref Range    POCT pH, Arterial 7.34 (L) 7.38 - 7.42 pH    POCT pCO2, Arterial 47 (H) 38 - 42 mm Hg    POCT pO2, Arterial 118 (H) 85 - 95 mm Hg    POCT SO2, Arterial 100 94 - 100 %    POCT Oxy Hemoglobin, Arterial 97.3 94.0 - 98.0 %    POCT Hematocrit Calculated, Arterial 32.0 (L) 36.0 - 46.0 %    POCT Sodium, Arterial 140 136 - 145 mmol/L    POCT Potassium, Arterial 4.0 3.5 - 5.3  mmol/L    POCT Chloride, Arterial 109 (H) 98 - 107 mmol/L    POCT Ionized Calcium, Arterial 1.15 1.10 - 1.33 mmol/L    POCT Glucose, Arterial 140 (H) 74 - 99 mg/dL    POCT Lactate, Arterial 0.6 0.4 - 2.0 mmol/L    POCT Base Excess, Arterial -0.7 -2.0 - 3.0 mmol/L    POCT HCO3 Calculated, Arterial 25.4 22.0 - 26.0 mmol/L    POCT Hemoglobin, Arterial 10.5 (L) 12.0 - 16.0 g/dL    POCT Anion Gap, Arterial 10 10 - 25 mmo/L    Patient Temperature 37.0 degrees Celsius    FiO2 36 %   BLOOD GAS ARTERIAL FULL PANEL   Result Value Ref Range    POCT pH, Arterial 7.37 (L) 7.38 - 7.42 pH    POCT pCO2, Arterial 45 (H) 38 - 42 mm Hg    POCT pO2, Arterial 77 (L) 85 - 95 mm Hg    POCT SO2, Arterial 97 94 - 100 %    POCT Oxy Hemoglobin, Arterial 94.2 94.0 - 98.0 %    POCT Hematocrit Calculated, Arterial 31.0 (L) 36.0 - 46.0 %    POCT Sodium, Arterial 138 136 - 145 mmol/L    POCT Potassium, Arterial 4.0 3.5 - 5.3 mmol/L    POCT Chloride, Arterial 105 98 - 107 mmol/L    POCT Ionized Calcium, Arterial 1.21 1.10 - 1.33 mmol/L    POCT Glucose, Arterial 117 (H) 74 - 99 mg/dL    POCT Lactate, Arterial 0.5 0.4 - 2.0 mmol/L    POCT Base Excess, Arterial 0.5 -2.0 - 3.0 mmol/L    POCT HCO3 Calculated, Arterial 26.0 22.0 - 26.0 mmol/L    POCT Hemoglobin, Arterial 10.2 (L) 12.0 - 16.0 g/dL    POCT Anion Gap, Arterial 11 10 - 25 mmo/L    Patient Temperature 37.0 degrees Celsius    FiO2 25 %   Calcium, Ionized   Result Value Ref Range    POCT Calcium, Ionized 1.16 1.1 - 1.33 mmol/L   CBC   Result Value Ref Range    WBC 6.9 4.4 - 11.3 x10*3/uL    nRBC 0.0 0.0 - 0.0 /100 WBCs    RBC 3.09 (L) 4.00 - 5.20 x10*6/uL    Hemoglobin 9.9 (L) 12.0 - 16.0 g/dL    Hematocrit 30.4 (L) 36.0 - 46.0 %    MCV 98 80 - 100 fL    MCH 32.0 26.0 - 34.0 pg    MCHC 32.6 32.0 - 36.0 g/dL    RDW 13.9 11.5 - 14.5 %    Platelets 126 (L) 150 - 450 x10*3/uL   Magnesium   Result Value Ref Range    Magnesium 2.11 1.60 - 2.40 mg/dL   Renal Function Panel   Result Value Ref Range     Glucose 115 (H) 74 - 99 mg/dL    Sodium 141 136 - 145 mmol/L    Potassium 3.6 3.5 - 5.3 mmol/L    Chloride 107 98 - 107 mmol/L    Bicarbonate 26 21 - 32 mmol/L    Anion Gap 12 10 - 20 mmol/L    Urea Nitrogen 7 6 - 23 mg/dL    Creatinine 0.45 (L) 0.50 - 1.05 mg/dL    eGFR >90 >60 mL/min/1.73m*2    Calcium 7.9 (L) 8.6 - 10.6 mg/dL    Phosphorus 2.9 2.5 - 4.9 mg/dL    Albumin 3.2 (L) 3.4 - 5.0 g/dL        Plan:    - R ARIS with catheter blocks performed postoperatively on 08/03/24   - Ambit ball with Ropivacaine 0.2%/NaCl 0.9% 500mL, Rate 7 cc/hr bilaterally  - Ambit medication will not interfere with pain medication prescribed by the primary team.   - Please be aware of local anesthetic toxic dose and absorption variability before considering lidocaine patches  - Acute pain service will follow while catheters in place  - Rest of pain management per primary team    Acute Pain Resident  pg [phone] ph [phone]

## 2024-08-03 NOTE — HOSPITAL COURSE
Maribel Lakhani is a 63 y/o female with PMHx of anxiety, depression, lumbar spondylosis, insomnia, high cholesterol, myxoma of the heart, and malignant neoplasm of the transverse colon, who presented to [facility] on 8/2/2024 for planned cardiac surgery.     Admitted to CTICU 8/2 s/p minimally invasive excision of myxoma.    OPERATION/PROCEDURE: 8/2/2024 with Rosaura Morales   1. Mini Thoracotomy.  2. Excision of left Atrial Myxoma.  3. Right femoral cutdown.  4. Right Femoral and Atrial Cannulation  5. Direct repair of right femoral artery.    CTICU Course: Uneventful   Transfer to LT3: 8/3/2024  =================    Floor Course:  - Patient was diuresed for fluid volume overload post cardiac surgery; Preop weight: ***kg, discharge wt: ***kg    - On ASA, statin, BB, *** by discharge    - Epicardial wires CUT on ***  - Telemetry at discharge ***  - 2v CXR done ***  - Postop echo done ***    - Cardiac rehab referral was placed  - PT recs home and low intensity therapy  - Anticipate discharge to home with homecare    On day of discharge, vital signs were stable and no acute distress was noted. All questions were answered. After VS and labs were reviewed it was determined the patient was stable for discharge.     Discharged on ***  ====================    Hospital day of discharge management- spent >30 minutes coordinating the discharge and counseling/educating patient and family regarding discharge instructions.     Past Medical History:  - Anxiety   - Depression   - Colon CA  - HLD  - Myxoma of Heart   - # Hx Decreased moods c/w major depression and increased anxiety -- settled down.  stable off all SSRIs.  - Arthritis     Past Surgical History:  - CARDIAC CATHETERIZATION N/A 7/26/2024    Procedure: Left Heart Cath;  Surgeon: Dariel Dumont MD;  Location: [facility];  Service: Cardiovascular;  Laterality: N/A;  - DILATION AND CURETTAGE OF UTERUS   05/05/2014  - FOOT SURGERY   - Oral  Surgery    Medications Prior to Admission  - diclofenac (Voltaren) 75 mg EC tablet; Take 1 tablet (75 mg) by mouth 2 times a day as needed (pain). Do not crush, chew, or split.   - ezetimibe (Zetia) 10 mg tablet; Take 1 tablet (10 mg) by mouth once daily.    - mv-mn/folic ac/calcium/vit K1 (WOMEN'S 50 PLUS MULTIVITAMIN ORAL)       - traZODone (Desyrel) 100 mg tablet; Take 1 tablet (100 mg) by mouth once daily.   - venlafaxine XR (Effexor-XR) 37.5 mg 24 hr capsule; Take 1 capsule (37.5 mg) by mouth once daily. Do not crush or chew.      Allergy: Adhesive tape-silicones, Ampicillin, Cefdinir, and [id number] antimigraine agents    Social History  - Smoking status: Former; Cigarettes Quit 7/2024 (0.5 PPD x 15 years)   - Smokeless tobacco: Never  - Vaping status: Never Used  - Alcohol use: Yes; 4 drinks/week   - Drug use: Never    Family History:   - Mother: Lupus, Sjogren's Syndrome, fibromyalgia   - Father: Cancer, hearing loss

## 2024-08-04 ENCOUNTER — HOME HEALTH ADMISSION (OUTPATIENT)
Dept: HOME HEALTH SERVICES | Facility: HOME HEALTH | Age: 64
End: 2024-08-04
Payer: COMMERCIAL

## 2024-08-04 ENCOUNTER — APPOINTMENT (OUTPATIENT)
Dept: RADIOLOGY | Facility: HOSPITAL | Age: 64
End: 2024-08-04
Payer: COMMERCIAL

## 2024-08-04 VITALS
HEART RATE: 62 BPM | WEIGHT: 149.47 LBS | SYSTOLIC BLOOD PRESSURE: 92 MMHG | TEMPERATURE: 99 F | OXYGEN SATURATION: 93 % | RESPIRATION RATE: 16 BRPM | BODY MASS INDEX: 22.65 KG/M2 | HEIGHT: 68 IN | DIASTOLIC BLOOD PRESSURE: 55 MMHG

## 2024-08-04 LAB
ALBUMIN SERPL BCP-MCNC: 3.4 G/DL (ref 3.4–5)
ANION GAP SERPL CALC-SCNC: 11 MMOL/L (ref 10–20)
BUN SERPL-MCNC: 9 MG/DL (ref 6–23)
CALCIUM SERPL-MCNC: 8.4 MG/DL (ref 8.6–10.6)
CHLORIDE SERPL-SCNC: 100 MMOL/L (ref 98–107)
CO2 SERPL-SCNC: 29 MMOL/L (ref 21–32)
CREAT SERPL-MCNC: 0.65 MG/DL (ref 0.5–1.05)
EGFRCR SERPLBLD CKD-EPI 2021: >90 ML/MIN/1.73M*2
ERYTHROCYTE [DISTWIDTH] IN BLOOD BY AUTOMATED COUNT: 13.3 % (ref 11.5–14.5)
GLUCOSE SERPL-MCNC: 111 MG/DL (ref 74–99)
HCT VFR BLD AUTO: 30.2 % (ref 36–46)
HGB BLD-MCNC: 9.7 G/DL (ref 12–16)
MAGNESIUM SERPL-MCNC: 1.96 MG/DL (ref 1.6–2.4)
MCH RBC QN AUTO: 31.9 PG (ref 26–34)
MCHC RBC AUTO-ENTMCNC: 32.1 G/DL (ref 32–36)
MCV RBC AUTO: 99 FL (ref 80–100)
NRBC BLD-RTO: 0 /100 WBCS (ref 0–0)
PHOSPHATE SERPL-MCNC: 2.5 MG/DL (ref 2.5–4.9)
PLATELET # BLD AUTO: 127 X10*3/UL (ref 150–450)
POTASSIUM SERPL-SCNC: 3.9 MMOL/L (ref 3.5–5.3)
RBC # BLD AUTO: 3.04 X10*6/UL (ref 4–5.2)
SODIUM SERPL-SCNC: 136 MMOL/L (ref 136–145)
WBC # BLD AUTO: 6.7 X10*3/UL (ref 4.4–11.3)

## 2024-08-04 PROCEDURE — 99231 SBSQ HOSP IP/OBS SF/LOW 25: CPT | Performed by: STUDENT IN AN ORGANIZED HEALTH CARE EDUCATION/TRAINING PROGRAM

## 2024-08-04 PROCEDURE — 2500000004 HC RX 250 GENERAL PHARMACY W/ HCPCS (ALT 636 FOR OP/ED): Performed by: INTERNAL MEDICINE

## 2024-08-04 PROCEDURE — 71045 X-RAY EXAM CHEST 1 VIEW: CPT

## 2024-08-04 PROCEDURE — 2500000002 HC RX 250 W HCPCS SELF ADMINISTERED DRUGS (ALT 637 FOR MEDICARE OP, ALT 636 FOR OP/ED): Performed by: INTERNAL MEDICINE

## 2024-08-04 PROCEDURE — 71045 X-RAY EXAM CHEST 1 VIEW: CPT | Performed by: STUDENT IN AN ORGANIZED HEALTH CARE EDUCATION/TRAINING PROGRAM

## 2024-08-04 PROCEDURE — 2500000001 HC RX 250 WO HCPCS SELF ADMINISTERED DRUGS (ALT 637 FOR MEDICARE OP): Performed by: INTERNAL MEDICINE

## 2024-08-04 PROCEDURE — 2500000001 HC RX 250 WO HCPCS SELF ADMINISTERED DRUGS (ALT 637 FOR MEDICARE OP)

## 2024-08-04 PROCEDURE — 97161 PT EVAL LOW COMPLEX 20 MIN: CPT | Mod: GP

## 2024-08-04 PROCEDURE — 2500000004 HC RX 250 GENERAL PHARMACY W/ HCPCS (ALT 636 FOR OP/ED)

## 2024-08-04 PROCEDURE — 83735 ASSAY OF MAGNESIUM: CPT

## 2024-08-04 PROCEDURE — 97116 GAIT TRAINING THERAPY: CPT | Mod: GP

## 2024-08-04 PROCEDURE — 85027 COMPLETE CBC AUTOMATED: CPT

## 2024-08-04 PROCEDURE — 80069 RENAL FUNCTION PANEL: CPT

## 2024-08-04 PROCEDURE — 99232 SBSQ HOSP IP/OBS MODERATE 35: CPT

## 2024-08-04 PROCEDURE — 2500000002 HC RX 250 W HCPCS SELF ADMINISTERED DRUGS (ALT 637 FOR MEDICARE OP, ALT 636 FOR OP/ED)

## 2024-08-04 PROCEDURE — 1200000002 HC GENERAL ROOM WITH TELEMETRY DAILY

## 2024-08-04 RX ORDER — POLYETHYLENE GLYCOL 3350 17 G/17G
17 POWDER, FOR SOLUTION ORAL 2 TIMES DAILY
Status: DISCONTINUED | OUTPATIENT
Start: 2024-08-04 | End: 2024-08-08 | Stop reason: HOSPADM

## 2024-08-04 RX ORDER — METOPROLOL TARTRATE 25 MG/1
12.5 TABLET, FILM COATED ORAL 2 TIMES DAILY
Status: DISCONTINUED | OUTPATIENT
Start: 2024-08-04 | End: 2024-08-08 | Stop reason: HOSPADM

## 2024-08-04 RX ORDER — POTASSIUM CHLORIDE 20 MEQ/1
20 TABLET, EXTENDED RELEASE ORAL ONCE
Status: COMPLETED | OUTPATIENT
Start: 2024-08-04 | End: 2024-08-04

## 2024-08-04 RX ORDER — ADHESIVE BANDAGE
30 BANDAGE TOPICAL DAILY PRN
Status: DISCONTINUED | OUTPATIENT
Start: 2024-08-04 | End: 2024-08-08 | Stop reason: HOSPADM

## 2024-08-04 RX ORDER — METOPROLOL TARTRATE 25 MG/1
25 TABLET, FILM COATED ORAL 2 TIMES DAILY
Status: DISCONTINUED | OUTPATIENT
Start: 2024-08-04 | End: 2024-08-04

## 2024-08-04 RX ORDER — METOPROLOL TARTRATE 25 MG/1
12.5 TABLET, FILM COATED ORAL ONCE
Status: COMPLETED | OUTPATIENT
Start: 2024-08-04 | End: 2024-08-04

## 2024-08-04 RX ORDER — MAGNESIUM SULFATE HEPTAHYDRATE 40 MG/ML
2 INJECTION, SOLUTION INTRAVENOUS ONCE
Status: COMPLETED | OUTPATIENT
Start: 2024-08-04 | End: 2024-08-04

## 2024-08-04 RX ORDER — LANOLIN ALCOHOL/MO/W.PET/CERES
400 CREAM (GRAM) TOPICAL ONCE
Status: COMPLETED | OUTPATIENT
Start: 2024-08-04 | End: 2024-08-04

## 2024-08-04 RX ADMIN — SENNOSIDES AND DOCUSATE SODIUM 2 TABLET: 50; 8.6 TABLET ORAL at 08:58

## 2024-08-04 RX ADMIN — ASPIRIN 81 MG 81 MG: 81 TABLET ORAL at 08:58

## 2024-08-04 RX ADMIN — HEPARIN SODIUM 5000 UNITS: 5000 INJECTION INTRAVENOUS; SUBCUTANEOUS at 18:21

## 2024-08-04 RX ADMIN — VENLAFAXINE HYDROCHLORIDE 37.5 MG: 37.5 CAPSULE, EXTENDED RELEASE ORAL at 08:58

## 2024-08-04 RX ADMIN — HEPARIN SODIUM 5000 UNITS: 5000 INJECTION INTRAVENOUS; SUBCUTANEOUS at 08:58

## 2024-08-04 RX ADMIN — METHOCARBAMOL 500 MG: 500 TABLET ORAL at 20:37

## 2024-08-04 RX ADMIN — SODIUM CHLORIDE, POTASSIUM CHLORIDE, SODIUM LACTATE AND CALCIUM CHLORIDE 500 ML: 600; 310; 30; 20 INJECTION, SOLUTION INTRAVENOUS at 08:58

## 2024-08-04 RX ADMIN — POLYETHYLENE GLYCOL 3350 17 G: 17 POWDER, FOR SOLUTION ORAL at 08:58

## 2024-08-04 RX ADMIN — HEPARIN SODIUM 5000 UNITS: 5000 INJECTION INTRAVENOUS; SUBCUTANEOUS at 00:58

## 2024-08-04 RX ADMIN — OXYCODONE HYDROCHLORIDE 5 MG: 5 TABLET ORAL at 14:09

## 2024-08-04 RX ADMIN — TRAZODONE HYDROCHLORIDE 100 MG: 50 TABLET ORAL at 20:37

## 2024-08-04 RX ADMIN — ACETAMINOPHEN 650 MG: 325 TABLET ORAL at 20:37

## 2024-08-04 RX ADMIN — ACETAMINOPHEN 650 MG: 325 TABLET ORAL at 14:09

## 2024-08-04 RX ADMIN — ACETAMINOPHEN 650 MG: 325 TABLET ORAL at 06:02

## 2024-08-04 RX ADMIN — POTASSIUM CHLORIDE 20 MEQ: 1500 TABLET, EXTENDED RELEASE ORAL at 08:58

## 2024-08-04 RX ADMIN — CEFAZOLIN SODIUM 2 G: 2 INJECTION, SOLUTION INTRAVENOUS at 09:11

## 2024-08-04 RX ADMIN — MAGNESIUM SULFATE HEPTAHYDRATE 2 G: 40 INJECTION, SOLUTION INTRAVENOUS at 04:05

## 2024-08-04 RX ADMIN — OXYCODONE HYDROCHLORIDE 10 MG: 5 TABLET ORAL at 09:10

## 2024-08-04 RX ADMIN — POTASSIUM CHLORIDE 20 MEQ: 1500 TABLET, EXTENDED RELEASE ORAL at 06:02

## 2024-08-04 RX ADMIN — OXYCODONE HYDROCHLORIDE 10 MG: 5 TABLET ORAL at 03:29

## 2024-08-04 RX ADMIN — ACETAMINOPHEN 650 MG: 325 TABLET ORAL at 00:58

## 2024-08-04 RX ADMIN — MAGNESIUM OXIDE TAB 400 MG (241.3 MG ELEMENTAL MG) 400 MG: 400 (241.3 MG) TAB at 08:58

## 2024-08-04 RX ADMIN — OXYCODONE HYDROCHLORIDE 10 MG: 5 TABLET ORAL at 20:37

## 2024-08-04 RX ADMIN — METOPROLOL TARTRATE 12.5 MG: 25 TABLET, FILM COATED ORAL at 06:02

## 2024-08-04 RX ADMIN — CEFAZOLIN SODIUM 2 G: 2 INJECTION, SOLUTION INTRAVENOUS at 00:58

## 2024-08-04 RX ADMIN — Medication 1 TABLET: at 08:58

## 2024-08-04 ASSESSMENT — PAIN SCALES - GENERAL
PAINLEVEL_OUTOF10: 5 - MODERATE PAIN
PAINLEVEL_OUTOF10: 6
PAINLEVEL_OUTOF10: 7
PAINLEVEL_OUTOF10: 9
PAINLEVEL_OUTOF10: 4
PAINLEVEL_OUTOF10: 7
PAINLEVEL_OUTOF10: 5 - MODERATE PAIN

## 2024-08-04 ASSESSMENT — ACTIVITIES OF DAILY LIVING (ADL): ADL_ASSISTANCE: INDEPENDENT

## 2024-08-04 ASSESSMENT — COGNITIVE AND FUNCTIONAL STATUS - GENERAL
HELP NEEDED FOR BATHING: A LITTLE
WALKING IN HOSPITAL ROOM: A LITTLE
DAILY ACTIVITIY SCORE: 21
CLIMB 3 TO 5 STEPS WITH RAILING: A LOT
TURNING FROM BACK TO SIDE WHILE IN FLAT BAD: A LITTLE
STANDING UP FROM CHAIR USING ARMS: A LITTLE
MOVING TO AND FROM BED TO CHAIR: A LITTLE
DRESSING REGULAR UPPER BODY CLOTHING: A LITTLE
MOBILITY SCORE: 18
MOBILITY SCORE: 19
MOVING TO AND FROM BED TO CHAIR: A LITTLE
STANDING UP FROM CHAIR USING ARMS: A LITTLE
WALKING IN HOSPITAL ROOM: A LITTLE
DRESSING REGULAR LOWER BODY CLOTHING: A LITTLE
CLIMB 3 TO 5 STEPS WITH RAILING: A LOT

## 2024-08-04 ASSESSMENT — PAIN DESCRIPTION - ORIENTATION
ORIENTATION: MID;RIGHT
ORIENTATION: MID;RIGHT

## 2024-08-04 ASSESSMENT — PAIN - FUNCTIONAL ASSESSMENT
PAIN_FUNCTIONAL_ASSESSMENT: 0-10

## 2024-08-04 ASSESSMENT — PAIN DESCRIPTION - LOCATION
LOCATION: INCISION
LOCATION: INCISION

## 2024-08-04 NOTE — PROGRESS NOTES
Postop Pain HPI -   Palliative: relieved with IV analgesics and regional local anesthetics  Provocative: movement  Quality:  burning and aching  Radiation:  none  Severity:  6/10  Timing: constant    24-HOUR OPIOID CONSUMPTION:  Oxy 10mg x4    Scheduled medications  acetaminophen, 650 mg, oral, q6h  aspirin, 81 mg, oral, Daily  heparin (porcine), 5,000 Units, subcutaneous, q8h  [Held by provider] metoprolol tartrate, 25 mg, oral, BID  multivitamin with minerals, 1 tablet, oral, Daily  polyethylene glycol, 17 g, oral, Daily  sennosides-docusate sodium, 2 tablet, oral, BID  traZODone, 100 mg, oral, Nightly  venlafaxine XR, 37.5 mg, oral, Daily with breakfast      Continuous medications     PRN medications  PRN medications: dextrose **OR** glucagon, methocarbamol, naloxone, [DISCONTINUED] ondansetron **OR** ondansetron, oxyCODONE, oxyCODONE, oxygen     Physical Exam:  Constitutional:  no distress, alert and cooperative  Eyes: clear sclera  Head/Neck: No apparent injury, trachea midline  Respiratory/Thorax: Patent airways, thorax symmetric, breathing comfortably  Cardiovascular: no pitting edema  Gastrointestinal: Nondistended  Musculoskeletal: ROM intact  Extremities: no clubbing  Neurological: alert, terrell x4  Psychological: Appropriate affect    Results for orders placed or performed during the hospital encounter of 08/02/24 (from the past 24 hour(s))   CBC   Result Value Ref Range    WBC 6.7 4.4 - 11.3 x10*3/uL    nRBC 0.0 0.0 - 0.0 /100 WBCs    RBC 3.04 (L) 4.00 - 5.20 x10*6/uL    Hemoglobin 9.7 (L) 12.0 - 16.0 g/dL    Hematocrit 30.2 (L) 36.0 - 46.0 %    MCV 99 80 - 100 fL    MCH 31.9 26.0 - 34.0 pg    MCHC 32.1 32.0 - 36.0 g/dL    RDW 13.3 11.5 - 14.5 %    Platelets 127 (L) 150 - 450 x10*3/uL   Magnesium   Result Value Ref Range    Magnesium 1.96 1.60 - 2.40 mg/dL   Renal Function Panel   Result Value Ref Range    Glucose 111 (H) 74 - 99 mg/dL    Sodium 136 136 - 145 mmol/L    Potassium 3.9 3.5 - 5.3 mmol/L     Chloride 100 98 - 107 mmol/L    Bicarbonate 29 21 - 32 mmol/L    Anion Gap 11 10 - 20 mmol/L    Urea Nitrogen 9 6 - 23 mg/dL    Creatinine 0.65 0.50 - 1.05 mg/dL    eGFR >90 >60 mL/min/1.73m*2    Calcium 8.4 (L) 8.6 - 10.6 mg/dL    Phosphorus 2.5 2.5 - 4.9 mg/dL    Albumin 3.4 3.4 - 5.0 g/dL       Plan:     - R ARIS with catheter blocks performed postoperatively on 08/03/24   - C/w Ambit ball with Ropivacaine 0.2%/NaCl 0.9% 500mL, Rate 10 cc/hr   - Ambit medication will not interfere with pain medication prescribed by the primary team.   - Please be aware of local anesthetic toxic dose and absorption variability before considering lidocaine patches  - Acute pain service will follow while catheters in place  - Rest of pain management per primary team     Acute Pain Resident  pg [phone] ph [phone]

## 2024-08-04 NOTE — SIGNIFICANT EVENT
"VERONICA   08/04/24 1024   Onset Documentation   Rapid Response Initiated By Radar auto page   Pager Time 1024   Arrival Time 1030   Event End Time 1050   Primary Reason for Call Radar auto page     RADAR page auto generated from VS -see documented VS. Radar score 6. Upon arrival pt sitting up in the chair- recheck of temp and 37.3. Pt stating she \"feels tired today\". Off O2 after getting into the chair and states she feels like she is \"breathing heavy\". Pulse ox 91% on RA and when placed on 1L NC 95% and pt states she feels better. NP -cardiac surgery aware.  Pt to sit up in chair a few more hours and will call if anything needed. Pt ok to remain on the floor for continued monitoring-bedside nurse will call with any concerns.     "

## 2024-08-04 NOTE — SIGNIFICANT EVENT
VERONICA   08/04/24 1749   Onset Documentation   Rapid Response Initiated By Radar auto page   Pager Time 1749   Arrival Time 1755   Event End Time 1805   Primary Reason for Call Radar auto page     RADAR page auto generated from VS -see documented VS. Radar score 6. Upon arrival pt resting in bed- spoke with nurse and aware of softer BP. Recheck of VS and BP 98/59- pulse ox 93% on RA. RR even non labored and pt with no complaints.  Pt ok to remain on the floor for continued monitoring-tele-bedside nurse updated and will call with any concerns.

## 2024-08-04 NOTE — PROGRESS NOTES
Physical Therapy    Physical Therapy Evaluation & Treatment    Patient Name: Maribel Lakhani  MRN: [id number]  Today's Date: 8/4/2024   Time Calculation  Start Time: 1305  Stop Time: 1330  Time Calculation (min): 25 min    Assessment/Plan   PT Assessment  PT Assessment Results: Decreased endurance, Impaired balance, Decreased mobility  Rehab Prognosis: Good  Evaluation/Treatment Tolerance: Patient tolerated treatment well  Medical Staff Made Aware: Yes  Strengths: Ability to acquire knowledge, Attitude of self, Support and attitude of living partners  End of Session Communication: Bedside nurse  Assessment Comment: Pt with good tolerance to upright activity. Pt was able to ambulate 250ft with CGA and WW. Pt steady and with 0 LOB. Patient would benefit from skilled physical therapy to maximize functional mobility and safety. Pt is appropriate for low intensity therapy when medically appropriate for DC.  End of Session Patient Position: Up in chair, Alarm off, not on at start of session   IP OR SWING BED PT PLAN  Inpatient or Swing Bed: Inpatient  PT Plan  Treatment/Interventions: Bed mobility, Transfer training, Gait training, Stair training, Balance training, Endurance training, Strengthening, Therapeutic exercise, Therapeutic activity  PT Plan: Ongoing PT  PT Frequency: 3 times per week  PT Discharge Recommendations: Low intensity level of continued care  Equipment Recommended upon Discharge: Wheeled walker  PT Recommended Transfer Status: Assist x1, Assistive device (WW)  PT - OK to Discharge: Yes (meaning pt seen and dc rec made)  RN cleared prior to session    Subjective   General Visit Information:  General  Reason for Referral: 63 y/o female s/p minimally invasive excision of myxoma 8/2  Past Medical History Relevant to Rehab: anxiety, depression, lumbar spondylosis, insomnia, high cholesterol, myxoma of the heart, and malignant neoplasm of the transverse colon.  Family/Caregiver Present: Yes  Caregiver Feedback:  , son, and daughter present and supportive  Prior to Session Communication: Bedside nurse  Patient Position Received: Up in chair, Alarm off, not on at start of session  Preferred Learning Style: auditory, verbal  General Comment: Pt pleasant and agreeable to therapy  Home Living:  Home Living  Type of Home: House  Lives With: Spouse  Home Adaptive Equipment: None  Home Layout: Two level, Able to live on main level with bedroom/bathroom  Home Access: Stairs to enter with rails  Entrance Stairs-Rails: Right  Entrance Stairs-Number of Steps: 10  Bathroom Shower/Tub: Walk-in shower  Bathroom Equipment: Built-in shower seat  Home Living Comments: Pt states her craft room is on the 2nd floor  Prior Level of Function:  Prior Function Per Pt/Caregiver Report  Level of [place]: Independent with ADLs and functional transfers, Independent with homemaking with ambulation  ADL Assistance: Independent  Homemaking Assistance: Independent  Ambulatory Assistance: Independent  Prior Function Comments: Pt was IND in all ADLs and IADLs prior to admit and used no AD for ambulation. Pt endorses no recent falls  Precautions:  Precautions  Medical Precautions: Fall precautions  Vital Signs:  Vital Signs  Heart Rate: 67  Heart Rate Source: Monitor    Objective   Pain:  Pain Assessment  Pain Assessment: 0-10  0-10 (Numeric) Pain Score: 5 - Moderate pain  Pain Type: Surgical pain  Pain Location: Incision  Pain Orientation: Right, Mid  Pain Interventions: Repositioned, Ambulation/increased activity  Response to Interventions: resting comfortably  Cognition:  Cognition  Overall Cognitive Status: Within Functional Limits  Arousal/Alertness: Appropriate responses to stimuli  Orientation Level: Oriented X4  Following Commands: Follows all commands and directions without difficulty    General Assessments:  Activity Tolerance  Endurance: Tolerates 30 min exercise with multiple rests    Sensation  Light Touch: No apparent  deficits    Strength  Strength Comments: WFL  Coordination  Movements are Fluid and Coordinated: Yes    Postural Control  Postural Control: Within Functional Limits    Static Sitting Balance  Static Sitting-Balance Support: Bilateral upper extremity supported, Feet supported  Static Sitting-Level of Assistance: Distant supervision    Static Standing Balance  Static Standing-Balance Support: Bilateral upper extremity supported  Static Standing-Level of Assistance: Contact guard  Static Standing-Comment/Number of Minutes: WW  Functional Assessments:  Bed Mobility  Bed Mobility: No (Pt sitting in chair pre and post session)    Transfers  Transfer: Yes  Transfer 1  Transfer From 1: Sit to, Stand to  Transfer to 1: Stand, Sit  Technique 1: Sit to stand  Transfer Device 1: Walker  Transfer Level of Assistance 1: Minimum assistance  Trials/Comments 1: 2 trials; min vc for hand palcement and safety    Ambulation/Gait Training  Apart of treatment  Extremity/Trunk Assessments:  RLE   RLE : Within Functional Limits  LLE   LLE : Within Functional Limits  Treatments:  Ambulation/Gait Training  Ambulation/Gait Training Performed: Yes  Ambulation/Gait Training 1  Surface 1: Level tile  Device 1: Rolling walker  Assistance 1: Contact guard  Quality of Gait 1: Inconsistent stride length, Decreased step length (decreased lillie and endurance)  Comments/Distance (ft) 1: 10ft x 2, 250ft; min vc for AD use, safety, and sequencing  Outcome Measures:  [facility] Basic Mobility  Turning from your back to your side while in a flat bed without using bedrails: None  Moving from lying on your back to sitting on the side of a flat bed without using bedrails: A little  Moving to and from bed to chair (including a wheelchair): A little  Standing up from a chair using your arms (e.g. wheelchair or bedside chair): A little  To walk in hospital room: A little  Climbing 3-5 steps with railing: A lot  Basic Mobility - Total Score: 18    Encounter Problems        Encounter Problems (Active)       PT Problem       Patient will ambulate >300' with LRAD and Modified Independent        Start:  08/04/24    Expected End:  08/19/24            Patient will perform sit<>stand transfer with LRAD, and Modified Independent        Start:  08/04/24    Expected End:  08/19/24            Patient will ascend/descend 10 steps with Right Rail Ascending and supervision        Start:  08/04/24    Expected End:  08/19/24            Patient will complete supine to sit and sit to supine Independent        Start:  08/04/24    Expected End:  08/19/24               Pain - Adult              Education Documentation  Precautions, taught by Shelley Robledo, PT at 8/4/2024  3:16 PM.  Learner: Patient  Readiness: Acceptance  Method: Explanation, Demonstration  Response: Verbalizes Understanding, Needs Reinforcement    Body Mechanics, taught by Shelley Robledo, PT at 8/4/2024  3:16 PM.  Learner: Patient  Readiness: Acceptance  Method: Explanation, Demonstration  Response: Verbalizes Understanding, Needs Reinforcement    Mobility Training, taught by Shelley Robledo, PT at 8/4/2024  3:16 PM.  Learner: Patient  Readiness: Acceptance  Method: Explanation, Demonstration  Response: Verbalizes Understanding, Needs Reinforcement    Education Comments  No comments found.

## 2024-08-04 NOTE — PROGRESS NOTES
"CARDIAC SURGERY DAILY PROGRESS NOTE    Maribel Lakhani is a 63 y/o female with PMHx of anxiety, depression, lumbar spondylosis, insomnia, high cholesterol, myxoma of the heart, and malignant neoplasm of the transverse colon, who presented to [facility] on 8/2/2024 for planned cardiac surgery.     Admitted to CTICU 8/2 s/p minimally invasive excision of myxoma.    OPERATION/PROCEDURE: 8/2/2024 with Rosaura Morales   1. Mini Thoracotomy.  2. Excision of left Atrial Myxoma.  3. Right femoral cutdown.  4. Right Femoral and Atrial Cannulation  5. Direct repair of right femoral artery.    CTICU Course: Uneventful   Transfer to LT3: 8/3/2024  =================    Interval History:   - Chest tubes removed without issue  - Supp O2 weaned to RA   - Fluid Bolus given in AM   - increased BB    SUBJECTIVE:  No complaints     Objective   BP 93/58 (BP Location: Right arm, Patient Position: Sitting)   Pulse 65   Temp 36.3 °C (97.3 °F) (Temporal)   Resp 17   Ht 1.727 m (5' 8\")   Wt 67.8 kg (149 lb 7.6 oz)   SpO2 93%   BMI 22.73 kg/m²   0-10 (Numeric) Pain Score: 6   3 Day Weight Change: Unable to Calculate    Intake and Output    Intake/Output Summary (Last 24 hours) at 8/4/2024 1633  Last data filed at 8/4/2024 0819  Gross per 24 hour   Intake 460 ml   Output 300 ml   Net 160 ml       Physical Exam  Vitals and nursing note reviewed.   Constitutional:       General: She is not in acute distress.     Appearance: Normal appearance. She is normal weight. She is not ill-appearing.      Comments: Patient alert and awake sitting up in chair, in no acute distress.    HENT:      Head: Atraumatic.      Mouth/Throat:      Pharynx: Oropharynx is clear.   Eyes:      Conjunctiva/sclera: Conjunctivae normal.      Comments: Patient wearing glasses    Neck:      Comments: Trachea Midline   Cardiovascular:      Rate and Rhythm: Normal rate and regular rhythm.      Pulses: Normal pulses.      Heart sounds: No murmur heard.     No " moi.      Comments: Tele: SR 60's to 70's   +2 Equal and even pulses in all extremities   V wires - connected to external pacer; inappropriately pacing - OFF but connected   Pulmonary:      Effort: Pulmonary effort is normal. No respiratory distress.      Breath sounds: Normal breath sounds. No wheezing.      Comments: Equal and even chest expansion; thorax symmetric.   Diminished breath sounds in bilateral bases   Good inspiratory effort on Room Air     8/: Right CT removed  Abdominal:      General: Bowel sounds are normal. There is no distension.      Palpations: Abdomen is soft. There is no mass.      Tenderness: There is no abdominal tenderness.      Comments: Awaiting postop BM    Genitourinary:     Comments: Voids independently   Musculoskeletal:      Cervical back: Neck supple.      Right lower le+ Edema present.      Left lower le+ Edema present.      Comments: RHODES; 5/5 strength in all extremities   Ambulates without assistance or ambulatory aide    Skin:     General: Skin is warm and dry.      Capillary Refill: Capillary refill takes less than 2 seconds.      Coloration: Skin is not jaundiced or pale.      Findings: No bruising.      Comments: Right Thoracotomy site: well approximated, no s/s of infection or bleeding - SINTIA    Neurological:      General: No focal deficit present.      Mental Status: She is oriented to person, place, and time.   Psychiatric:         Mood and Affect: Mood normal.         Behavior: Behavior normal.       Medications  Scheduled medications  acetaminophen, 650 mg, oral, q6h  aspirin, 81 mg, oral, Daily  heparin (porcine), 5,000 Units, subcutaneous, q8h  [Held by provider] metoprolol tartrate, 25 mg, oral, BID  multivitamin with minerals, 1 tablet, oral, Daily  polyethylene glycol, 17 g, oral, Daily  sennosides-docusate sodium, 2 tablet, oral, BID  traZODone, 100 mg, oral, Nightly  venlafaxine XR, 37.5 mg, oral, Daily with breakfast    Continuous medications   PRN  medications  PRN medications: dextrose **OR** glucagon, methocarbamol, naloxone, [DISCONTINUED] ondansetron **OR** ondansetron, oxyCODONE, oxyCODONE, oxygen    LABS:  CMP:  Results from last 7 days   Lab Units 08/04/24  0404 08/03/24  0323 08/02/24  1328 07/31/24  1459   SODIUM mmol/L 136 141 143 139   POTASSIUM mmol/L 3.9 3.6 4.1 4.4   CHLORIDE mmol/L 100 107 109* 103   CO2 mmol/L 29 26 25 25   ANION GAP mmol/L 11 12 13 15   BUN mg/dL 9 7 9 17   CREATININE mg/dL 0.65 0.45* 0.51 0.61   EGFR mL/min/1.73m*2 >90 >90 >90 >90   MAGNESIUM mg/dL 1.96 2.11 3.33*  --    ALBUMIN g/dL 3.4 3.2* 3.2* 3.9   ALT U/L  --   --   --  15   AST U/L  --   --   --  20   BILIRUBIN TOTAL mg/dL  --   --   --  0.4     CBC:  Results from last 7 days   Lab Units 08/04/24  0404 08/03/24  0323 08/02/24  1328 07/31/24  1459   WBC AUTO x10*3/uL 6.7 6.9 9.2 6.7   HEMOGLOBIN g/dL 9.7* 9.9* 10.8* 12.2   HEMATOCRIT % 30.2* 30.4* 33.0* 36.3   PLATELETS AUTO x10*3/uL 127* 126* 128* 217   MCV fL 99 98 97 93     COAG:   Results from last 7 days   Lab Units 08/02/24  1328 07/31/24  1459   INR  1.2* 1.0     HEME/ENDO:  Results from last 7 days   Lab Units 07/31/24  1459   HEMOGLOBIN A1C % 4.9      CARDIAC:          IMAGING/ DIAGNOSTIC TESTING:  I have personally reviewed the following test result(s):        IMPRESSION & PLAN:  POD # 2 s/p Myxoma of the Heart with Dr. Morales   - Increase activity/ ambulation; PT/OT  - Encourage IS, C/DB; respiratory therapy; wean O2 as spike - on 2L NC   - Cardiac rehab referral   - Continue cardiac meds: ASA, BB  --- No indication for statin   - Pain and anticonstipation meds  - 8/4:  1 right pleural Chest tube removed without issue; postop removal CXR completed    - 2v CXR ordered for AM 8/5.   - Postop echo ordered on 8/4.   - CUT epicardial wires prior to discharge; per surgeon preference   - Tele until discharge  - Optimize nutrition and electrolytes    Rhythm  - 8/3: Overnight: Tele showed frequent periods of 3-5 beat runs  of SVT; treated with metoprolol, magnesium, potassium - resolved   - Tele: SR 60's to 70's; no episodes of SVT   - Continue BB: Metoprolol tartrate 12.5mg BID   - Adjust medications as tolerated    Acute Blood Loss Anemia   Recent Labs     08/04/24  0404 08/03/24  0323 08/02/24  1328 07/31/24  1459 07/23/24  1115 02/20/24  1139 08/28/23  0804   HGB 9.7* 9.9* 10.8* 12.2 14.4 14.5 14.2   HCT 30.2* 30.4* 33.0* 36.3 43.7 45.1 42.6   - MV, PO Iron x1mo  - Daily labs, transfuse as indicated    Thrombocytopenia  Recent Labs     08/04/24  0404 08/03/24  0323 08/02/24  1328 07/31/24  1459 07/23/24  1115 02/20/24  1139 08/28/23  0804   * 126* 128* 217 365 239 241   - Etiology likely postop/CPB related  - Trend with daily CBCs    Volume/Electrolyte Status: Preop wt Weight: 67.8 kg (149 lb 7.6 oz)   Vitals:    08/02/24 0556   Weight: 67.8 kg (149 lb 7.6 oz)     - Weight: X, 67.8 kg   - Adjust diuresis for postop cardiac surgery hypervolemia  - 8/4: No diuresis today, 500mL Fluid bolus given in AM   - Replete electrolytes for hypokalemia/hypomagnesemia  - Daily weights/strict I&Os  - Daily RFP     Hyperlipidemia: home meds: Zetia 10mg nightly   Lab Results   Component Value Date    CHOL 191 08/28/2023    HDL 64.3 08/28/2023    VLDL 14 08/28/2023    TRIG 71 08/28/2023   - continue home Zetia   - follow up lipid panel with PCP/ cardiologist for ongoing lipid management    Anxiety/Depression: Home meds: Trazodone 100mg daily; Effexor 37.5mg daily   -continue home medications as above   -sleep/ wake cycle hygiene  -control pain with PRN meds      Arthritis: Home Meds: Voltaren Topical Gel   - Continue home Voltaren PRN   - NO NSAIDs for 3 months s/p cardiac surgery   - PT/OT   - Hot/cold packs     Acute Postop Pain   - Acute Pain consulted, appreciate recs:   --- 8/3: R ARIS with catheter blocks placed    --- Ambit ball with Ropivacaine 0.2%/NaCl 0.9% 500mL, Rate 10 cc/hr bilaterally  --- Rest of the pain medication as per  primary team  - continue with PRN tramadol and Robaxin      VTE Prophylaxis: SCDs/TEDs, ambulation, SQ heparin   Code Status: Full Code    Dispo  - PT/OT recs low intensity level of continued care; home   - Would benefit from homecare RN for cardiac surgery carepath  - Anticipate discharge 2-3 days, pending diuresis, postop imaging, HR/BP Management.    - Will continue to assess discharge needs      CARRI Cardenas-CNP  Cardiac Surgery [facility]  [facility]  Team Phone [phone]    8/4/2024  4:33 PM

## 2024-08-04 NOTE — SIGNIFICANT EVENT
Procedure explained to patient, patient understood.  Dressing removed and cleaned with chlorhexidine.  Sutures removed without difficulty.       Patient instructed to take a deep breath in, once patient was holding their breath;  1 right pleural chest tube removed without difficulty, with tip intact.  Occlusive dressing applied. Patient tolerated well.       Stat 1V CXR ordered and showed no evidence of pnuemo.        Elisha Manzano, APRN-CNP  Cardiac Surgery [facility]  [facility]  Team Pager [phone]

## 2024-08-05 ENCOUNTER — APPOINTMENT (OUTPATIENT)
Dept: RADIOLOGY | Facility: HOSPITAL | Age: 64
End: 2024-08-05
Payer: COMMERCIAL

## 2024-08-05 LAB
ALBUMIN SERPL BCP-MCNC: 3 G/DL (ref 3.4–5)
ANION GAP SERPL CALC-SCNC: 8 MMOL/L (ref 10–20)
BUN SERPL-MCNC: 6 MG/DL (ref 6–23)
CALCIUM SERPL-MCNC: 8.4 MG/DL (ref 8.6–10.6)
CHLORIDE SERPL-SCNC: 103 MMOL/L (ref 98–107)
CO2 SERPL-SCNC: 30 MMOL/L (ref 21–32)
CREAT SERPL-MCNC: 0.42 MG/DL (ref 0.5–1.05)
EGFRCR SERPLBLD CKD-EPI 2021: >90 ML/MIN/1.73M*2
EJECTION FRACTION: 65 %
ERYTHROCYTE [DISTWIDTH] IN BLOOD BY AUTOMATED COUNT: 13.2 % (ref 11.5–14.5)
GLUCOSE SERPL-MCNC: 102 MG/DL (ref 74–99)
HCT VFR BLD AUTO: 25.4 % (ref 36–46)
HGB BLD-MCNC: 8.3 G/DL (ref 12–16)
MAGNESIUM SERPL-MCNC: 1.93 MG/DL (ref 1.6–2.4)
MCH RBC QN AUTO: 31.3 PG (ref 26–34)
MCHC RBC AUTO-ENTMCNC: 32.7 G/DL (ref 32–36)
MCV RBC AUTO: 96 FL (ref 80–100)
NRBC BLD-RTO: 0 /100 WBCS (ref 0–0)
PHOSPHATE SERPL-MCNC: 2.8 MG/DL (ref 2.5–4.9)
PLATELET # BLD AUTO: 115 X10*3/UL (ref 150–450)
POTASSIUM SERPL-SCNC: 4.2 MMOL/L (ref 3.5–5.3)
RBC # BLD AUTO: 2.65 X10*6/UL (ref 4–5.2)
SODIUM SERPL-SCNC: 137 MMOL/L (ref 136–145)
WBC # BLD AUTO: 4.5 X10*3/UL (ref 4.4–11.3)

## 2024-08-05 PROCEDURE — 99231 SBSQ HOSP IP/OBS SF/LOW 25: CPT

## 2024-08-05 PROCEDURE — 2500000004 HC RX 250 GENERAL PHARMACY W/ HCPCS (ALT 636 FOR OP/ED)

## 2024-08-05 PROCEDURE — 1200000002 HC GENERAL ROOM WITH TELEMETRY DAILY

## 2024-08-05 PROCEDURE — 2500000001 HC RX 250 WO HCPCS SELF ADMINISTERED DRUGS (ALT 637 FOR MEDICARE OP)

## 2024-08-05 PROCEDURE — 85027 COMPLETE CBC AUTOMATED: CPT

## 2024-08-05 PROCEDURE — 80069 RENAL FUNCTION PANEL: CPT

## 2024-08-05 PROCEDURE — 71046 X-RAY EXAM CHEST 2 VIEWS: CPT | Performed by: RADIOLOGY

## 2024-08-05 PROCEDURE — 2500000001 HC RX 250 WO HCPCS SELF ADMINISTERED DRUGS (ALT 637 FOR MEDICARE OP): Performed by: PHYSICIAN ASSISTANT

## 2024-08-05 PROCEDURE — 83735 ASSAY OF MAGNESIUM: CPT

## 2024-08-05 PROCEDURE — 99232 SBSQ HOSP IP/OBS MODERATE 35: CPT | Performed by: PHYSICIAN ASSISTANT

## 2024-08-05 PROCEDURE — 71046 X-RAY EXAM CHEST 2 VIEWS: CPT

## 2024-08-05 RX ORDER — METHOCARBAMOL 500 MG/1
500 TABLET, FILM COATED ORAL EVERY 8 HOURS
Status: DISCONTINUED | OUTPATIENT
Start: 2024-08-05 | End: 2024-08-06

## 2024-08-05 RX ADMIN — HEPARIN SODIUM 5000 UNITS: 5000 INJECTION INTRAVENOUS; SUBCUTANEOUS at 01:16

## 2024-08-05 RX ADMIN — ACETAMINOPHEN 650 MG: 325 TABLET ORAL at 01:16

## 2024-08-05 RX ADMIN — SENNOSIDES AND DOCUSATE SODIUM 2 TABLET: 50; 8.6 TABLET ORAL at 21:12

## 2024-08-05 RX ADMIN — Medication 1 TABLET: at 09:05

## 2024-08-05 RX ADMIN — SENNOSIDES AND DOCUSATE SODIUM 2 TABLET: 50; 8.6 TABLET ORAL at 09:05

## 2024-08-05 RX ADMIN — METOPROLOL TARTRATE 12.5 MG: 25 TABLET, FILM COATED ORAL at 21:12

## 2024-08-05 RX ADMIN — OXYCODONE HYDROCHLORIDE 10 MG: 5 TABLET ORAL at 01:16

## 2024-08-05 RX ADMIN — METOPROLOL TARTRATE 12.5 MG: 25 TABLET, FILM COATED ORAL at 09:05

## 2024-08-05 RX ADMIN — ASPIRIN 81 MG 81 MG: 81 TABLET ORAL at 09:05

## 2024-08-05 RX ADMIN — POLYETHYLENE GLYCOL 3350 17 G: 17 POWDER, FOR SOLUTION ORAL at 09:05

## 2024-08-05 RX ADMIN — ACETAMINOPHEN 650 MG: 325 TABLET ORAL at 14:51

## 2024-08-05 RX ADMIN — HEPARIN SODIUM 5000 UNITS: 5000 INJECTION INTRAVENOUS; SUBCUTANEOUS at 16:25

## 2024-08-05 RX ADMIN — ACETAMINOPHEN 650 MG: 325 TABLET ORAL at 21:11

## 2024-08-05 RX ADMIN — OXYCODONE HYDROCHLORIDE 10 MG: 5 TABLET ORAL at 05:50

## 2024-08-05 RX ADMIN — TRAZODONE HYDROCHLORIDE 100 MG: 50 TABLET ORAL at 21:11

## 2024-08-05 RX ADMIN — VENLAFAXINE HYDROCHLORIDE 37.5 MG: 37.5 CAPSULE, EXTENDED RELEASE ORAL at 09:06

## 2024-08-05 RX ADMIN — ACETAMINOPHEN 650 MG: 325 TABLET ORAL at 09:05

## 2024-08-05 RX ADMIN — OXYCODONE HYDROCHLORIDE 10 MG: 5 TABLET ORAL at 14:51

## 2024-08-05 RX ADMIN — OXYCODONE HYDROCHLORIDE 10 MG: 5 TABLET ORAL at 21:12

## 2024-08-05 RX ADMIN — METHOCARBAMOL 500 MG: 500 TABLET ORAL at 21:12

## 2024-08-05 RX ADMIN — OXYCODONE HYDROCHLORIDE 10 MG: 5 TABLET ORAL at 10:08

## 2024-08-05 RX ADMIN — HEPARIN SODIUM 5000 UNITS: 5000 INJECTION INTRAVENOUS; SUBCUTANEOUS at 09:06

## 2024-08-05 RX ADMIN — METHOCARBAMOL 500 MG: 500 TABLET ORAL at 12:33

## 2024-08-05 ASSESSMENT — PAIN - FUNCTIONAL ASSESSMENT
PAIN_FUNCTIONAL_ASSESSMENT: 0-10

## 2024-08-05 ASSESSMENT — COGNITIVE AND FUNCTIONAL STATUS - GENERAL
DRESSING REGULAR LOWER BODY CLOTHING: A LITTLE
MOBILITY SCORE: 21
WALKING IN HOSPITAL ROOM: A LITTLE
CLIMB 3 TO 5 STEPS WITH RAILING: A LOT
TOILETING: A LITTLE
PERSONAL GROOMING: A LITTLE
DRESSING REGULAR UPPER BODY CLOTHING: A LITTLE
DAILY ACTIVITIY SCORE: 19
DAILY ACTIVITIY SCORE: 21
DRESSING REGULAR UPPER BODY CLOTHING: A LITTLE
HELP NEEDED FOR BATHING: A LITTLE
WALKING IN HOSPITAL ROOM: A LITTLE
MOVING TO AND FROM BED TO CHAIR: A LITTLE
CLIMB 3 TO 5 STEPS WITH RAILING: A LITTLE
HELP NEEDED FOR BATHING: A LITTLE
TURNING FROM BACK TO SIDE WHILE IN FLAT BAD: A LITTLE
STANDING UP FROM CHAIR USING ARMS: A LITTLE
STANDING UP FROM CHAIR USING ARMS: A LITTLE
MOBILITY SCORE: 17
MOVING FROM LYING ON BACK TO SITTING ON SIDE OF FLAT BED WITH BEDRAILS: A LITTLE
DRESSING REGULAR LOWER BODY CLOTHING: A LITTLE

## 2024-08-05 ASSESSMENT — PAIN SCALES - GENERAL
PAINLEVEL_OUTOF10: 4
PAINLEVEL_OUTOF10: 8
PAINLEVEL_OUTOF10: 7
PAINLEVEL_OUTOF10: 7
PAINLEVEL_OUTOF10: 4
PAINLEVEL_OUTOF10: 3
PAINLEVEL_OUTOF10: 8

## 2024-08-05 NOTE — PROGRESS NOTES
"CARDIAC SURGERY DAILY PROGRESS NOTE    Maribel Lakhani is a 63 y/o female with PMHx of anxiety, depression, lumbar spondylosis, insomnia, high cholesterol, myxoma of the heart, and malignant neoplasm of the transverse colon, who presented to [facility] on 8/2/2024 for planned cardiac surgery.     Admitted to CTICU 8/2 s/p minimally invasive excision of myxoma.    OPERATION/PROCEDURE: 8/2/2024 with Rosaura Morales   1. Mini Thoracotomy.  2. Excision of left Atrial Myxoma.  3. Right femoral cutdown.  4. Right Femoral and Atrial Cannulation  5. Direct repair of right femoral artery.    CTICU Course: Uneventful   Transfer to LT3: 8/3/2024  =================    Interval History:   No events overnight    SUBJECTIVE:  States feels worse today than yesterday, some more incisional pain     Objective   BP 95/59 (BP Location: Right arm, Patient Position: Lying)   Pulse 58   Temp 37.2 °C (99 °F) (Temporal)   Resp 18   Ht 1.727 m (5' 8\")   Wt 71.7 kg (158 lb)   SpO2 93%   BMI 24.02 kg/m²   0-10 (Numeric) Pain Score: 4   3 Day Weight Change: 1.289 kg (2 lb 13.5 oz) per day    Intake and Output    Intake/Output Summary (Last 24 hours) at 8/5/2024 1155  Last data filed at 8/5/2024 0946  Gross per 24 hour   Intake 360 ml   Output 1150 ml   Net -790 ml       Physical Exam  Vitals and nursing note reviewed.   Constitutional:       General: She is not in acute distress.     Appearance: Normal appearance. She is normal weight. She is not ill-appearing.      Comments: Lying in bed in no acute distress, does have pain related to incisional site   Cardiovascular:      Rate and Rhythm: Normal rate and regular rhythm.      Pulses: Normal pulses.      Heart sounds: No murmur heard.     No gallop.      Comments: Tele: SR 60's to 70's   +2 Equal and even pulses in all extremities   V wires - connected to external pacer OFF but connected   Pulmonary:      Effort: Pulmonary effort is normal. No respiratory distress.      Breath " sounds: Normal breath sounds. No wheezing.      Comments: Equal and even chest expansion; thorax symmetric.   Diminished breath sounds in bilateral bases   Good inspiratory effort on Room Air     Abdominal:      General: Bowel sounds are normal. There is no distension.      Palpations: Abdomen is soft. There is no mass.      Tenderness: There is no abdominal tenderness.      Comments: Awaiting postop BM    Musculoskeletal:      Cervical back: Neck supple.      Right lower leg: No edema.      Left lower leg: No edema.      Comments: RHODES; 5/5 strength in all extremities   Ambulates without assistance or ambulatory aide    Skin:     General: Skin is warm and dry.      Capillary Refill: Capillary refill takes less than 2 seconds.      Coloration: Skin is not jaundiced or pale.      Findings: No bruising.      Comments: Right Thoracotomy site: well approximated, no s/s of infection or bleeding - SINTIA    Neurological:      General: No focal deficit present.      Mental Status: She is oriented to person, place, and time.   Psychiatric:         Mood and Affect: Mood normal.         Behavior: Behavior normal.       Medications  Scheduled medications  acetaminophen, 650 mg, oral, q6h  aspirin, 81 mg, oral, Daily  heparin (porcine), 5,000 Units, subcutaneous, q8h  metoprolol tartrate, 12.5 mg, oral, BID  multivitamin with minerals, 1 tablet, oral, Daily  polyethylene glycol, 17 g, oral, BID  sennosides-docusate sodium, 2 tablet, oral, BID  traZODone, 100 mg, oral, Nightly  venlafaxine XR, 37.5 mg, oral, Daily with breakfast    Continuous medications   PRN medications  PRN medications: dextrose **OR** glucagon, magnesium hydroxide, methocarbamol, naloxone, [DISCONTINUED] ondansetron **OR** ondansetron, oxyCODONE, oxyCODONE, oxygen    LABS:  CMP:  Results from last 7 days   Lab Units 08/05/24  0751 08/04/24  0404 08/03/24  0323 08/02/24  1328 07/31/24  1459   SODIUM mmol/L 137 136 141 143 139   POTASSIUM mmol/L 4.2 3.9 3.6 4.1  4.4   CHLORIDE mmol/L 103 100 107 109* 103   CO2 mmol/L 30 29 26 25 25   ANION GAP mmol/L 8* 11 12 13 15   BUN mg/dL 6 9 7 9 17   CREATININE mg/dL 0.42* 0.65 0.45* 0.51 0.61   EGFR mL/min/1.73m*2 >90 >90 >90 >90 >90   MAGNESIUM mg/dL 1.93 1.96 2.11 3.33*  --    ALBUMIN g/dL 3.0* 3.4 3.2* 3.2* 3.9   ALT U/L  --   --   --   --  15   AST U/L  --   --   --   --  20   BILIRUBIN TOTAL mg/dL  --   --   --   --  0.4     CBC:  Results from last 7 days   Lab Units 08/05/24  0751 08/04/24  0404 08/03/24  0323 08/02/24  1328 07/31/24  1459   WBC AUTO x10*3/uL 4.5 6.7 6.9 9.2 6.7   HEMOGLOBIN g/dL 8.3* 9.7* 9.9* 10.8* 12.2   HEMATOCRIT % 25.4* 30.2* 30.4* 33.0* 36.3   PLATELETS AUTO x10*3/uL 115* 127* 126* 128* 217   MCV fL 96 99 98 97 93     COAG:   Results from last 7 days   Lab Units 08/02/24  1328 07/31/24  1459   INR  1.2* 1.0     HEME/ENDO:  Results from last 7 days   Lab Units 07/31/24  1459   HEMOGLOBIN A1C % 4.9      CARDIAC:          IMAGING/ DIAGNOSTIC TESTING:  I have personally reviewed the following test result(s):        IMPRESSION & PLAN:  POD # 3 s/p excision of right atrial Myxoma with Dr. Morales   - Increase activity/ ambulation; PT/OT  - Cardiac rehab referral   - Continue cardiac meds: ASA, BB  --- No indication for statin   - Pain and anticonstipation meds  - 2v CXR reviewed with some vascular congestion otherwise clear  - No indication for post op ECHO   - CUT epicardial wires prior to discharge; per surgeon preference   - Tele until discharge  - Optimize nutrition and electrolytes    SR with runs of SVT  - 8/3: Overnight: Tele showed frequent periods of 3-5 beat runs of SVT; treated with metoprolol, magnesium, potassium - resolved   - Tele: SR 60's to 70's; no episodes of SVT   - Continue BB: Metoprolol tartrate 12.5mg BID   - Adjust medications as tolerated    Acute Blood Loss Anemia   Recent Labs     08/05/24  0751 08/04/24  0404 08/03/24  0323 08/02/24  1328 07/31/24  1459 07/23/24  1115 02/20/24  1139    HGB 8.3* 9.7* 9.9* 10.8* 12.2 14.4 14.5   HCT 25.4* 30.2* 30.4* 33.0* 36.3 43.7 45.1   - MV, PO Iron x1mo  - Daily labs, transfuse as indicated    Thrombocytopenia  Recent Labs     08/05/24  0751 08/04/24  0404 08/03/24  0323 08/02/24  1328 07/31/24  1459 07/23/24  1115 02/20/24  1139   * 127* 126* 128* 217 365 239   Etiology likely postop/CPB related  - Trend with daily CBCs    Volume/Electrolyte Status: Preop wt Weight: 67.8 kg (149 lb 7.6 oz)   Vitals:    08/05/24 0116   Weight: 71.7 kg (158 lb)     - Weight:, 67.8 kg, 71  - Replete electrolytes for hypokalemia/hypomagnesemia  - Daily weights/strict I&Os  - No indication for diuresis at this time  - Daily RFP     Hyperlipidemia: home meds: Zetia 10mg nightly   Lab Results   Component Value Date    CHOL 191 08/28/2023    HDL 64.3 08/28/2023    VLDL 14 08/28/2023    TRIG 71 08/28/2023   - continue home Zetia   - follow up lipid panel with PCP/ cardiologist for ongoing lipid management    Anxiety/Depression: Home meds: Trazodone 100mg daily; Effexor 37.5mg daily   -continue home medications as above   - Home Trazodone nightly  - Home Effexor daily  -sleep/ wake cycle hygiene     Arthritis: Home Meds: Voltaren Topical Gel   - Continue home Voltaren PRN   - NO NSAIDs for 3 months s/p cardiac surgery   - PT/OT   - Hot/cold packs     Acute Postop Pain   - Acute Pain consulted, appreciate recs: - will follow up on plans for dc'ing catheter  --- 8/3: R ARIS with catheter blocks placed    --- Ambit ball with Ropivacaine 0.2%/NaCl 0.9% 500mL, Rate 10 cc/hr bilaterally  --- Rest of the pain medication as per primary team  - continue with PRN oxycodone  - Add scheduled robaxin today instead of PRN    VTE Prophylaxis: SCDs/TEDs, ambulation, SQ heparin   Code Status: Full Code    Dispo  - PT/OT recs low intensity level of continued care; home   - Would benefit from homecare RN for cardiac surgery carepath  - Anticipate discharge tomorrow or Wednesday pending pain  control   - Will continue to assess discharge needs      Ramu Mccloud PA-C  Cardiac Surgery [facility]  [facility]  Team Phone [phone]    8/5/2024  11:55 AM

## 2024-08-05 NOTE — PROGRESS NOTES
Postop Pain HPI -   Palliative: relieved with IV analgesics and regional local anesthetics  Provocative: movement  Quality:  burning and aching  Radiation:  none  Severity:  8/10  Timing: constant    24-HOUR OPIOID CONSUMPTION:  Oxycodone 10mg x 3     Scheduled medications  acetaminophen, 650 mg, oral, q6h  aspirin, 81 mg, oral, Daily  heparin (porcine), 5,000 Units, subcutaneous, q8h  metoprolol tartrate, 12.5 mg, oral, BID  multivitamin with minerals, 1 tablet, oral, Daily  polyethylene glycol, 17 g, oral, BID  sennosides-docusate sodium, 2 tablet, oral, BID  traZODone, 100 mg, oral, Nightly  venlafaxine XR, 37.5 mg, oral, Daily with breakfast      Continuous medications     PRN medications  PRN medications: dextrose **OR** glucagon, magnesium hydroxide, methocarbamol, naloxone, [DISCONTINUED] ondansetron **OR** ondansetron, oxyCODONE, oxyCODONE, oxygen     Physical Exam:  Constitutional:  no distress, alert and cooperative  Eyes: clear sclera  Head/Neck: No apparent injury, trachea midline  Respiratory/Thorax: Patent airways, thorax symmetric, breathing comfortably  Cardiovascular: no pitting edema  Gastrointestinal: Nondistended  Musculoskeletal: ROM intact  Extremities: no clubbing  Neurological: alert, terrell x4  Psychological: Appropriate affect    No results found for this or any previous visit (from the past 24 hour(s)).    Plan:     - R ARIS with catheter blocks performed postoperatively on 08/03/24   - C/w Ambit ball with Ropivacaine 0.2%/NaCl 0.9% 500mL, Rate 10 cc/hr   - Ambit medication will not interfere with pain medication prescribed by the primary team.   - Please be aware of local anesthetic toxic dose and absorption variability before considering lidocaine patches  - Refill Ambit medication and administer bolus  - Acute pain service will follow while catheters in place  - Rest of pain management per primary team     Acute Pain Resident  pg [phone] ph [phone]

## 2024-08-05 NOTE — DISCHARGE INSTRUCTIONS
Don't forget to “Keep Your Move in the Tube!!”     Please refer to the “Move in the Tube” handout.  -- Load bearing activities can be completed if you are “staying in the tube.” If you are attempting load bearing activities, let pain be your guide with when trying an activity “out of the tube”. If an activity hurts or is uncomfortable go back to doing it while you stay “in the tube”. There is no time limit to “stay in the tube”.     -- Non-load bearing activities, which are your activities of daily living, can be completed with your arms “out of the tube” as long as you remain pain free. Some activities of daily living examples are dressing, personal care, showering, washing hair, and toilet hygiene.     Don't forget to KEEP YOUR MOVE IN THE TUBE and think of a T. Ad dinosaur!    Your Epicardial pacing wires were cut, this is safe if you require MRI in the future however please instruct the performing Radiologist you have epicardial pacemaker wires.    Maintain a log of your weight and blood pressure. If you find yourself gaining or losing >/= 5lbs in a day your diuretic may need adjusting. Please contact the cardiology office or cardiac surgery office for advice.         Additional Post-Operative Instructions:  - Remember to use your Incentive spirometer 10x/hr while awake. Remember to cough and deep breath.  - No NSAIDs (common over-the-counter NSAIDs are ibuprofen/Motrin/Advil, naproxen/Naprosyn/Aleve) for 3 months after cardiac surgery; if NSAIDs needed after 3 months, clear use with cardiologist before starting.       Your home medications may have changed after surgery. Carefully compare this list with your prescription bottles at home and set aside any medications you are told to not take so you do not confuse them. Do not dispose of any medications until your follow-up, since your doctors may restart some at your follow-up appointments. It is important to bring a complete, current list of your medications  to any medical appointments or hospitalizations.    For any questions about your discharge, please call the cardiac surgery office at [phone]

## 2024-08-05 NOTE — PROGRESS NOTES
Met with patient and her  and introduced myself as Care Coordinator and member of the discharge planning team.  Pt is s/p excision of myxoma. She plans to return home at time of discharge with her . She was independent in ADL's prior to hospitalization. If necessary she would use  Home Care. Care coordinator will continue to follow for home going needs.   Gracia Parkinson RN

## 2024-08-06 LAB
ALBUMIN SERPL BCP-MCNC: 2.9 G/DL (ref 3.4–5)
ANION GAP SERPL CALC-SCNC: 12 MMOL/L (ref 10–20)
BLOOD EXPIRATION DATE: NORMAL
BUN SERPL-MCNC: 8 MG/DL (ref 6–23)
CALCIUM SERPL-MCNC: 8.4 MG/DL (ref 8.6–10.6)
CHLORIDE SERPL-SCNC: 105 MMOL/L (ref 98–107)
CO2 SERPL-SCNC: 26 MMOL/L (ref 21–32)
CREAT SERPL-MCNC: 0.43 MG/DL (ref 0.5–1.05)
DISPENSE STATUS: NORMAL
EGFRCR SERPLBLD CKD-EPI 2021: >90 ML/MIN/1.73M*2
ERYTHROCYTE [DISTWIDTH] IN BLOOD BY AUTOMATED COUNT: 13.2 % (ref 11.5–14.5)
GLUCOSE SERPL-MCNC: 137 MG/DL (ref 74–99)
HCT VFR BLD AUTO: 26.6 % (ref 36–46)
HGB BLD-MCNC: 8.8 G/DL (ref 12–16)
MAGNESIUM SERPL-MCNC: 1.74 MG/DL (ref 1.6–2.4)
MCH RBC QN AUTO: 32.1 PG (ref 26–34)
MCHC RBC AUTO-ENTMCNC: 33.1 G/DL (ref 32–36)
MCV RBC AUTO: 97 FL (ref 80–100)
NRBC BLD-RTO: 0 /100 WBCS (ref 0–0)
PHOSPHATE SERPL-MCNC: 3.7 MG/DL (ref 2.5–4.9)
PLATELET # BLD AUTO: 152 X10*3/UL (ref 150–450)
POTASSIUM SERPL-SCNC: 4 MMOL/L (ref 3.5–5.3)
PRODUCT BLOOD TYPE: 6200
PRODUCT CODE: NORMAL
RBC # BLD AUTO: 2.74 X10*6/UL (ref 4–5.2)
SODIUM SERPL-SCNC: 139 MMOL/L (ref 136–145)
UNIT ABO: NORMAL
UNIT NUMBER: NORMAL
UNIT RH: NORMAL
UNIT VOLUME: 350
WBC # BLD AUTO: 4.1 X10*3/UL (ref 4.4–11.3)
XM INTEP: NORMAL

## 2024-08-06 PROCEDURE — 83735 ASSAY OF MAGNESIUM: CPT

## 2024-08-06 PROCEDURE — 2500000004 HC RX 250 GENERAL PHARMACY W/ HCPCS (ALT 636 FOR OP/ED)

## 2024-08-06 PROCEDURE — 2500000001 HC RX 250 WO HCPCS SELF ADMINISTERED DRUGS (ALT 637 FOR MEDICARE OP)

## 2024-08-06 PROCEDURE — 99231 SBSQ HOSP IP/OBS SF/LOW 25: CPT | Performed by: STUDENT IN AN ORGANIZED HEALTH CARE EDUCATION/TRAINING PROGRAM

## 2024-08-06 PROCEDURE — 85027 COMPLETE CBC AUTOMATED: CPT

## 2024-08-06 PROCEDURE — 80069 RENAL FUNCTION PANEL: CPT

## 2024-08-06 PROCEDURE — 2500000001 HC RX 250 WO HCPCS SELF ADMINISTERED DRUGS (ALT 637 FOR MEDICARE OP): Performed by: PHYSICIAN ASSISTANT

## 2024-08-06 PROCEDURE — 99232 SBSQ HOSP IP/OBS MODERATE 35: CPT | Performed by: NURSE PRACTITIONER

## 2024-08-06 PROCEDURE — 2500000001 HC RX 250 WO HCPCS SELF ADMINISTERED DRUGS (ALT 637 FOR MEDICARE OP): Performed by: NURSE PRACTITIONER

## 2024-08-06 PROCEDURE — 2500000004 HC RX 250 GENERAL PHARMACY W/ HCPCS (ALT 636 FOR OP/ED): Performed by: NURSE PRACTITIONER

## 2024-08-06 PROCEDURE — 1200000002 HC GENERAL ROOM WITH TELEMETRY DAILY

## 2024-08-06 RX ORDER — MAGNESIUM SULFATE HEPTAHYDRATE 40 MG/ML
2 INJECTION, SOLUTION INTRAVENOUS ONCE
Status: COMPLETED | OUTPATIENT
Start: 2024-08-06 | End: 2024-08-06

## 2024-08-06 RX ORDER — OXYCODONE HYDROCHLORIDE 5 MG/1
10 TABLET ORAL EVERY 6 HOURS PRN
Status: DISCONTINUED | OUTPATIENT
Start: 2024-08-06 | End: 2024-08-07

## 2024-08-06 RX ORDER — METHOCARBAMOL 500 MG/1
750 TABLET, FILM COATED ORAL EVERY 8 HOURS
Status: COMPLETED | OUTPATIENT
Start: 2024-08-07 | End: 2024-08-08

## 2024-08-06 RX ORDER — BISACODYL 10 MG/1
10 SUPPOSITORY RECTAL ONCE
Status: COMPLETED | OUTPATIENT
Start: 2024-08-06 | End: 2024-08-06

## 2024-08-06 RX ADMIN — METHOCARBAMOL 500 MG: 500 TABLET ORAL at 06:30

## 2024-08-06 RX ADMIN — OXYCODONE HYDROCHLORIDE 10 MG: 5 TABLET ORAL at 01:33

## 2024-08-06 RX ADMIN — SENNOSIDES AND DOCUSATE SODIUM 2 TABLET: 50; 8.6 TABLET ORAL at 21:16

## 2024-08-06 RX ADMIN — OXYCODONE HYDROCHLORIDE 10 MG: 5 TABLET ORAL at 06:30

## 2024-08-06 RX ADMIN — MAGNESIUM SULFATE HEPTAHYDRATE 2 G: 40 INJECTION, SOLUTION INTRAVENOUS at 13:50

## 2024-08-06 RX ADMIN — TRAZODONE HYDROCHLORIDE 100 MG: 50 TABLET ORAL at 21:16

## 2024-08-06 RX ADMIN — ACETAMINOPHEN 650 MG: 325 TABLET ORAL at 13:51

## 2024-08-06 RX ADMIN — POLYETHYLENE GLYCOL 3350 17 G: 17 POWDER, FOR SOLUTION ORAL at 21:16

## 2024-08-06 RX ADMIN — METHOCARBAMOL 500 MG: 500 TABLET ORAL at 21:16

## 2024-08-06 RX ADMIN — ACETAMINOPHEN 650 MG: 325 TABLET ORAL at 21:16

## 2024-08-06 RX ADMIN — SENNOSIDES AND DOCUSATE SODIUM 2 TABLET: 50; 8.6 TABLET ORAL at 09:06

## 2024-08-06 RX ADMIN — POLYETHYLENE GLYCOL 3350 17 G: 17 POWDER, FOR SOLUTION ORAL at 09:00

## 2024-08-06 RX ADMIN — METOPROLOL TARTRATE 12.5 MG: 25 TABLET, FILM COATED ORAL at 21:16

## 2024-08-06 RX ADMIN — HEPARIN SODIUM 5000 UNITS: 5000 INJECTION INTRAVENOUS; SUBCUTANEOUS at 09:04

## 2024-08-06 RX ADMIN — HEPARIN SODIUM 5000 UNITS: 5000 INJECTION INTRAVENOUS; SUBCUTANEOUS at 18:00

## 2024-08-06 RX ADMIN — HEPARIN SODIUM 5000 UNITS: 5000 INJECTION INTRAVENOUS; SUBCUTANEOUS at 01:33

## 2024-08-06 RX ADMIN — OXYCODONE HYDROCHLORIDE 10 MG: 5 TABLET ORAL at 13:51

## 2024-08-06 RX ADMIN — VENLAFAXINE HYDROCHLORIDE 37.5 MG: 37.5 CAPSULE, EXTENDED RELEASE ORAL at 09:04

## 2024-08-06 RX ADMIN — METHOCARBAMOL 500 MG: 500 TABLET ORAL at 13:50

## 2024-08-06 RX ADMIN — OXYCODONE HYDROCHLORIDE 10 MG: 5 TABLET ORAL at 18:00

## 2024-08-06 RX ADMIN — BISACODYL 10 MG: 10 SUPPOSITORY RECTAL at 18:01

## 2024-08-06 RX ADMIN — ASPIRIN 81 MG 81 MG: 81 TABLET ORAL at 09:05

## 2024-08-06 RX ADMIN — Medication 1 TABLET: at 09:05

## 2024-08-06 RX ADMIN — ACETAMINOPHEN 650 MG: 325 TABLET ORAL at 06:30

## 2024-08-06 RX ADMIN — METOPROLOL TARTRATE 12.5 MG: 25 TABLET, FILM COATED ORAL at 09:06

## 2024-08-06 ASSESSMENT — COGNITIVE AND FUNCTIONAL STATUS - GENERAL
MOBILITY SCORE: 24
DAILY ACTIVITIY SCORE: 24
CLIMB 3 TO 5 STEPS WITH RAILING: A LITTLE
MOBILITY SCORE: 23
DAILY ACTIVITIY SCORE: 24

## 2024-08-06 ASSESSMENT — PAIN SCALES - GENERAL
PAINLEVEL_OUTOF10: 7
PAINLEVEL_OUTOF10: 7
PAINLEVEL_OUTOF10: 8
PAINLEVEL_OUTOF10: 6
PAINLEVEL_OUTOF10: 0 - NO PAIN
PAINLEVEL_OUTOF10: 10 - WORST POSSIBLE PAIN
PAINLEVEL_OUTOF10: 8
PAINLEVEL_OUTOF10: 9
PAINLEVEL_OUTOF10: 6

## 2024-08-06 ASSESSMENT — PAIN - FUNCTIONAL ASSESSMENT
PAIN_FUNCTIONAL_ASSESSMENT: 0-10

## 2024-08-06 ASSESSMENT — PAIN DESCRIPTION - DESCRIPTORS: DESCRIPTORS: SHARP

## 2024-08-06 NOTE — PROGRESS NOTES
Postop Pain HPI -   Palliative: relieved with IV analgesics and regional local anesthetics  Provocative: movement  Quality:  burning and aching  Radiation:  none  Severity:  6/10  Timing: constant    24-HOUR OPIOID CONSUMPTION:  Oxycodone 10mg x3    Scheduled medications  acetaminophen, 650 mg, oral, q6h  aspirin, 81 mg, oral, Daily  heparin (porcine), 5,000 Units, subcutaneous, q8h  methocarbamol, 500 mg, oral, q8h  metoprolol tartrate, 12.5 mg, oral, BID  multivitamin with minerals, 1 tablet, oral, Daily  polyethylene glycol, 17 g, oral, BID  sennosides-docusate sodium, 2 tablet, oral, BID  traZODone, 100 mg, oral, Nightly  venlafaxine XR, 37.5 mg, oral, Daily with breakfast      Continuous medications     PRN medications  PRN medications: dextrose **OR** glucagon, magnesium hydroxide, naloxone, [DISCONTINUED] ondansetron **OR** ondansetron, oxyCODONE, oxyCODONE, oxygen     Physical Exam:  Constitutional:  no distress, alert and cooperative  Eyes: clear sclera  Head/Neck: No apparent injury, trachea midline  Respiratory/Thorax: Patent airways, thorax symmetric, breathing comfortably  Cardiovascular: no pitting edema  Gastrointestinal: Nondistended  Musculoskeletal: ROM intact  Extremities: no clubbing  Neurological: alert, terrell x4  Psychological: Appropriate affect    No results found for this or any previous visit (from the past 24 hour(s)).      Plan:   -Catheter pulled 8/5  - R ARIS with catheter blocks performed postoperatively on 08/03/24   - Acute pain service will sign off  - Rest of pain management per primary team     Acute Pain Resident  pg [phone] ph [phone]          Left without being seen (saw a nurse but never saw a physician or midlevel provider)

## 2024-08-06 NOTE — CARE PLAN
The patient's goals for the shift include        Problem: Skin  Goal: Decreased wound size/increased tissue granulation at next dressing change  Outcome: Progressing  Goal: Participates in plan/prevention/treatment measures  Outcome: Progressing  Goal: Prevent/manage excess moisture  Outcome: Progressing  Goal: Prevent/minimize sheer/friction injuries  Outcome: Progressing  Goal: Promote/optimize nutrition  Outcome: Progressing  Goal: Promote skin healing  Outcome: Progressing     Problem: Safety - Adult  Goal: Free from fall injury  Outcome: Progressing     Problem: Pain - Adult  Goal: Verbalizes/displays adequate comfort level or baseline comfort level  Outcome: Progressing     Problem: Discharge Planning  Goal: Discharge to home or other facility with appropriate resources  Outcome: Progressing     The clinical goals for the shift include Pt will remain hemodynamically stable this shift

## 2024-08-06 NOTE — CONSULTS
Postop Pain HPI -   Palliative: relieved with IV analgesics and regional local anesthetics  Provocative: movement  Quality:  burning and aching  Radiation:  none  Severity:  6/10  Timing: constant    24-HOUR OPIOID CONSUMPTION:  Oxycodone 10mg x3    Scheduled medications  acetaminophen, 650 mg, oral, q6h  aspirin, 81 mg, oral, Daily  heparin (porcine), 5,000 Units, subcutaneous, q8h  methocarbamol, 500 mg, oral, q8h  metoprolol tartrate, 12.5 mg, oral, BID  multivitamin with minerals, 1 tablet, oral, Daily  polyethylene glycol, 17 g, oral, BID  sennosides-docusate sodium, 2 tablet, oral, BID  traZODone, 100 mg, oral, Nightly  venlafaxine XR, 37.5 mg, oral, Daily with breakfast      Continuous medications     PRN medications  PRN medications: dextrose **OR** glucagon, magnesium hydroxide, naloxone, [DISCONTINUED] ondansetron **OR** ondansetron, oxyCODONE, oxyCODONE, oxygen     Physical Exam:  Constitutional:  no distress, alert and cooperative  Eyes: clear sclera  Head/Neck: No apparent injury, trachea midline  Respiratory/Thorax: Patent airways, thorax symmetric, breathing comfortably  Cardiovascular: no pitting edema  Gastrointestinal: Nondistended  Musculoskeletal: ROM intact  Extremities: no clubbing  Neurological: alert, terrell x4  Psychological: Appropriate affect    No results found for this or any previous visit (from the past 24 hour(s)).      Plan:   -Catheter pulled 8/5  - R ARIS with catheter blocks performed postoperatively on 08/03/24   - Please be aware of local anesthetic toxic dose and absorption variability before considering lidocaine patches  - Acute pain service will sign off  - Rest of pain management per primary team     Acute Pain Resident  pg [phone] ph [phone]

## 2024-08-06 NOTE — PROGRESS NOTES
"CARDIAC SURGERY DAILY PROGRESS NOTE    Maribel Lakhani is a 63 y/o female with PMHx of anxiety, depression, lumbar spondylosis, insomnia, high cholesterol, myxoma of the heart, and malignant neoplasm of the transverse colon, who presented to [facility] on 8/2/2024 for planned cardiac surgery.     Admitted to CTICU 8/2 s/p minimally invasive excision of myxoma.    OPERATION/PROCEDURE: 8/2/2024 with Rosaura Morales   1. Mini Thoracotomy.  2. Excision of left Atrial Myxoma.  3. Right femoral cutdown.  4. Right Femoral and Atrial Cannulation  5. Direct repair of right femoral artery.    CTICU Course: Uneventful   Transfer to LT3: 8/3/2024  =================    Interval History:   No events overnight    SUBJECTIVE:  -Endorses mild pain improvement today  -No BM since admission     Objective   /72 (BP Location: Right arm, Patient Position: Lying)   Pulse 64   Temp 36.9 °C (98.4 °F) (Temporal)   Resp 16   Ht 1.727 m (5' 8\")   Wt 70.3 kg (155 lb)   SpO2 93%   BMI 23.57 kg/m²   0-10 (Numeric) Pain Score: 8   3 Day Weight Change: Unable to Calculate    Intake and Output    Intake/Output Summary (Last 24 hours) at 8/6/2024 0734  Last data filed at 8/5/2024 2000  Gross per 24 hour   Intake 920 ml   Output 500 ml   Net 420 ml       Physical Exam  Vitals and nursing note reviewed.   Constitutional:       General: She is not in acute distress.     Appearance: Normal appearance. She is normal weight. She is not ill-appearing.      Comments: Resting in bed without acute distress    Eyes:      Extraocular Movements: Extraocular movements intact.   Cardiovascular:      Rate and Rhythm: Normal rate and regular rhythm.      Pulses: Normal pulses.      Heart sounds: No murmur heard.     No gallop.      Comments: +2 Equal and even pulses in all extremities   V wires - connected to external pacer OFF but connected   Pulmonary:      Effort: Pulmonary effort is normal. No respiratory distress.      Breath sounds: Normal " breath sounds. No wheezing.      Comments: Equal chest expansion, non-labored   Diminished breath sounds in bilateral bases   Good inspiratory effort on Room Air     Abdominal:      General: Bowel sounds are normal. There is no distension.      Palpations: Abdomen is soft. There is no mass.      Tenderness: There is no abdominal tenderness.      Comments: Awaiting postop BM    Musculoskeletal:      Right lower leg: No edema.      Left lower leg: No edema.      Comments: CARMELO  Ambulates throughout the halls unassisted    Skin:     General: Skin is warm and dry.      Capillary Refill: Capillary refill takes less than 2 seconds.      Coloration: Skin is not jaundiced or pale.      Findings: No bruising.      Comments: Right Thoracotomy site: well approximated, no s/s of infection or bleeding - SINTIA    Neurological:      General: No focal deficit present.      Mental Status: She is oriented to person, place, and time.   Psychiatric:         Mood and Affect: Mood normal.         Behavior: Behavior normal.       Medications  Scheduled medications  acetaminophen, 650 mg, oral, q6h  aspirin, 81 mg, oral, Daily  heparin (porcine), 5,000 Units, subcutaneous, q8h  methocarbamol, 500 mg, oral, q8h  metoprolol tartrate, 12.5 mg, oral, BID  multivitamin with minerals, 1 tablet, oral, Daily  polyethylene glycol, 17 g, oral, BID  sennosides-docusate sodium, 2 tablet, oral, BID  traZODone, 100 mg, oral, Nightly  venlafaxine XR, 37.5 mg, oral, Daily with breakfast    Continuous medications   PRN medications  PRN medications: dextrose **OR** glucagon, magnesium hydroxide, naloxone, [DISCONTINUED] ondansetron **OR** ondansetron, oxyCODONE, oxyCODONE, oxygen    LABS:  CMP:  Results from last 7 days   Lab Units 08/05/24  0751 08/04/24  0404 08/03/24  0323 08/02/24  1328 07/31/24  1459   SODIUM mmol/L 137 136 141 143 139   POTASSIUM mmol/L 4.2 3.9 3.6 4.1 4.4   CHLORIDE mmol/L 103 100 107 109* 103   CO2 mmol/L 30 29 26 25 25   ANION GAP  mmol/L 8* 11 12 13 15   BUN mg/dL 6 9 7 9 17   CREATININE mg/dL 0.42* 0.65 0.45* 0.51 0.61   EGFR mL/min/1.73m*2 >90 >90 >90 >90 >90   MAGNESIUM mg/dL 1.93 1.96 2.11 3.33*  --    ALBUMIN g/dL 3.0* 3.4 3.2* 3.2* 3.9   ALT U/L  --   --   --   --  15   AST U/L  --   --   --   --  20   BILIRUBIN TOTAL mg/dL  --   --   --   --  0.4     CBC:  Results from last 7 days   Lab Units 08/05/24  0751 08/04/24  0404 08/03/24  0323 08/02/24  1328 07/31/24  1459   WBC AUTO x10*3/uL 4.5 6.7 6.9 9.2 6.7   HEMOGLOBIN g/dL 8.3* 9.7* 9.9* 10.8* 12.2   HEMATOCRIT % 25.4* 30.2* 30.4* 33.0* 36.3   PLATELETS AUTO x10*3/uL 115* 127* 126* 128* 217   MCV fL 96 99 98 97 93     COAG:   Results from last 7 days   Lab Units 08/02/24  1328 07/31/24  1459   INR  1.2* 1.0     HEME/ENDO:  Results from last 7 days   Lab Units 07/31/24  1459   HEMOGLOBIN A1C % 4.9      CARDIAC:          IMAGING/ DIAGNOSTIC TESTING:  I have personally reviewed the following test result(s):    XR chest 2 views    Result Date: 8/6/2024  Interpreted By:  Frantz Whitten, STUDY: XR CHEST 2 VIEWS; 8/5/2024 11:09 am   INDICATION: Signs/Symptoms:s/p cardiac surgery.   COMPARISON: 08/04/2024.   ACCESSION NUMBER(S): [id number]   ORDERING CLINICIAN: BUNNY CRUM   FINDINGS:     CARDIOMEDIASTINAL SILHOUETTE: Cardiomediastinal silhouette is normal in size and configuration.   LUNGS: Postoperative bibasilar atelectasis and small effusions. Midlung and basilar atelectasis. No pneumothorax.   ABDOMEN: No remarkable upper abdominal findings.   BONES: No acute osseous changes.       1.  Postoperative bibasilar atelectasis without pneumothorax.     Signed by: Frantz Whitten 8/6/2024 6:48 AM Dictation workstation:   [id number]    XR chest 1 view    Result Date: 8/4/2024  Interpreted By:  Dixon, Alexander,  and Vazquez Alem STUDY: XR CHEST 1 VIEW;  8/4/2024 4:19 pm   INDICATION: Signs/Symptoms:s/p CT removal.   COMPARISON: Same day chest radiograph time stamped 3:46 a.m.   ACCESSION  NUMBER(S): [id number]   ORDERING CLINICIAN: BUNNY CRUM   FINDINGS: AP radiograph of the chest was provided. Interval removal of the right basilar chest tube.   CARDIOMEDIASTINAL SILHOUETTE: Cardiomediastinal silhouette is stable in size and configuration. Epicardial pacing wires in place.   LUNGS: Unchanged trace right apical pneumothorax. Similar mild bandlike bibasilar atelectasis; trace/small right pleural effusion not excluded.   ABDOMEN: No remarkable upper abdominal findings.   BONES: Similar fracture deformity of the right 1st rib.       1. Interval removal of right basilar chest tube. Unchanged trace right apical pneumothorax. 2. Similar mild bandlike bibasilar atelectasis; trace/small right pleural effusion not excluded.   I personally reviewed the image(s)/study and resident interpretation as stated by Dr. Alem Shukla MD. I agree with the findings as stated. This study was interpreted at [facility], [place], [place].   MACRO: None   Signed by: Alexander Dixon 8/4/2024 8:39 PM Dictation workstation:   [id number]       IMPRESSION & PLAN:  POD # 5 s/p excision of right atrial Myxoma with Dr. Morales   - Encourage activity/ambulation; PT/OT  - Cardiac rehab referral   - Continue cardiac meds: ASA, BB  --- No indication for statin   - Pain and anticonstipation meds  - 2v CXR reviewed with some vascular congestion otherwise clear  - No indication for post op ECHO   - CUT epicardial wires prior to discharge; per surgeon preference   - Tele until discharge  - Optimize nutrition and electrolytes    SR with runs of SVT  - 8/3: Overnight: Tele showed frequent periods of 3-5 beat runs of SVT; treated with metoprolol, magnesium, potassium - resolved   - Tele: SR 60's to 70's; no episodes of SVT   - Continue BB: Metoprolol tartrate 12.5mg BID   - Adjust medications as tolerated    Acute Blood Loss Anemia   Recent Labs     08/05/24  0751 08/04/24  0404 08/03/24  0323 08/02/24  1328  07/31/24  1459 07/23/24  1115 02/20/24  1139   HGB 8.3* 9.7* 9.9* 10.8* 12.2 14.4 14.5   HCT 25.4* 30.2* 30.4* 33.0* 36.3 43.7 45.1   - MV, PO Iron x1mo  - Daily labs, transfuse as indicated    Thrombocytopenia  Recent Labs     08/05/24  0751 08/04/24  0404 08/03/24  0323 08/02/24  1328 07/31/24  1459 07/23/24  1115 02/20/24  1139   * 127* 126* 128* 217 365 239   Etiology likely postop/CPB related  - Trend with daily CBCs    Volume/Electrolyte Status: Preop wt Weight: 67.8 kg (149 lb 7.6 oz)   Vitals:    08/06/24 0624   Weight: 70.3 kg (155 lb)     - Weight: 70kg  - Replete electrolytes as indicated  - Daily weights/strict I&Os  - No indication for diuresis at this time  - Daily RFP     Hyperlipidemia: home meds: Zetia 10mg nightly   Lab Results   Component Value Date    CHOL 191 08/28/2023    HDL 64.3 08/28/2023    VLDL 14 08/28/2023    TRIG 71 08/28/2023   - continue home Zetia   - follow up lipid panel with PCP/ cardiologist for ongoing lipid management    Anxiety/Depression: Home meds: Trazodone 100mg daily; Effexor 37.5mg daily   -continue home medications as above   - Home Trazodone nightly  - Home Effexor daily  -sleep/ wake cycle hygiene     Arthritis: Home Meds: Voltaren Topical Gel   - Continue home Voltaren PRN   - NO NSAIDs for 3 months s/p cardiac surgery   - PT/OT   - Hot/cold packs     Acute Postop Pain   - Acute Pain consulted, appreciate recs: - will follow up on plans for dc'ing catheter  --- 8/3: R ARIS with catheter blocks placed/Ambit ball with Ropivacaine 0.2%/NaCl 0.9% 500mL, Rate 10 cc/hr bilaterally - removed 8/5/24  --- Rest of the pain medication as per primary team  - Continue with PRN pain regimen  - Continue scheduled robaxin     VTE Prophylaxis: SCDs/TEDs, ambulation, SQ heparin   Code Status: Full Code    Dispo  - PT/OT recs low intensity level of continued care; home   - Would benefit from homecare RN for cardiac surgery carepath  - Anticipate discharge later this week pending  pain control   - Will continue to assess discharge needs      CARRI Naik-CNP, DNP  Cardiac Surgery RADHA  [facility]  Team Phone [phone]    8/6/2024  7:34 AM

## 2024-08-07 PROBLEM — D15.1 MYXOMA OF HEART (HHS-HCC): Status: RESOLVED | Noted: 2024-07-17 | Resolved: 2024-08-07

## 2024-08-07 LAB
ALBUMIN SERPL BCP-MCNC: 3 G/DL (ref 3.4–5)
ANION GAP SERPL CALC-SCNC: 11 MMOL/L (ref 10–20)
BUN SERPL-MCNC: 6 MG/DL (ref 6–23)
CALCIUM SERPL-MCNC: 8.7 MG/DL (ref 8.6–10.6)
CHLORIDE SERPL-SCNC: 104 MMOL/L (ref 98–107)
CO2 SERPL-SCNC: 28 MMOL/L (ref 21–32)
CREAT SERPL-MCNC: 0.54 MG/DL (ref 0.5–1.05)
EGFRCR SERPLBLD CKD-EPI 2021: >90 ML/MIN/1.73M*2
ERYTHROCYTE [DISTWIDTH] IN BLOOD BY AUTOMATED COUNT: 13.3 % (ref 11.5–14.5)
GLUCOSE SERPL-MCNC: 95 MG/DL (ref 74–99)
HCT VFR BLD AUTO: 28 % (ref 36–46)
HGB BLD-MCNC: 9.2 G/DL (ref 12–16)
MAGNESIUM SERPL-MCNC: 1.86 MG/DL (ref 1.6–2.4)
MCH RBC QN AUTO: 31.3 PG (ref 26–34)
MCHC RBC AUTO-ENTMCNC: 32.9 G/DL (ref 32–36)
MCV RBC AUTO: 95 FL (ref 80–100)
NRBC BLD-RTO: 0 /100 WBCS (ref 0–0)
PHOSPHATE SERPL-MCNC: 4.4 MG/DL (ref 2.5–4.9)
PLATELET # BLD AUTO: 194 X10*3/UL (ref 150–450)
POTASSIUM SERPL-SCNC: 4.3 MMOL/L (ref 3.5–5.3)
RBC # BLD AUTO: 2.94 X10*6/UL (ref 4–5.2)
SODIUM SERPL-SCNC: 139 MMOL/L (ref 136–145)
WBC # BLD AUTO: 3.5 X10*3/UL (ref 4.4–11.3)

## 2024-08-07 PROCEDURE — 2500000001 HC RX 250 WO HCPCS SELF ADMINISTERED DRUGS (ALT 637 FOR MEDICARE OP)

## 2024-08-07 PROCEDURE — 2500000001 HC RX 250 WO HCPCS SELF ADMINISTERED DRUGS (ALT 637 FOR MEDICARE OP): Performed by: STUDENT IN AN ORGANIZED HEALTH CARE EDUCATION/TRAINING PROGRAM

## 2024-08-07 PROCEDURE — 85027 COMPLETE CBC AUTOMATED: CPT

## 2024-08-07 PROCEDURE — 2500000001 HC RX 250 WO HCPCS SELF ADMINISTERED DRUGS (ALT 637 FOR MEDICARE OP): Performed by: PHYSICIAN ASSISTANT

## 2024-08-07 PROCEDURE — 99231 SBSQ HOSP IP/OBS SF/LOW 25: CPT | Performed by: PHYSICIAN ASSISTANT

## 2024-08-07 PROCEDURE — 2500000004 HC RX 250 GENERAL PHARMACY W/ HCPCS (ALT 636 FOR OP/ED): Performed by: STUDENT IN AN ORGANIZED HEALTH CARE EDUCATION/TRAINING PROGRAM

## 2024-08-07 PROCEDURE — 83735 ASSAY OF MAGNESIUM: CPT

## 2024-08-07 PROCEDURE — 2500000004 HC RX 250 GENERAL PHARMACY W/ HCPCS (ALT 636 FOR OP/ED): Performed by: PHYSICIAN ASSISTANT

## 2024-08-07 PROCEDURE — 80069 RENAL FUNCTION PANEL: CPT

## 2024-08-07 PROCEDURE — 2500000004 HC RX 250 GENERAL PHARMACY W/ HCPCS (ALT 636 FOR OP/ED)

## 2024-08-07 PROCEDURE — 2500000001 HC RX 250 WO HCPCS SELF ADMINISTERED DRUGS (ALT 637 FOR MEDICARE OP): Performed by: NURSE PRACTITIONER

## 2024-08-07 PROCEDURE — 1200000002 HC GENERAL ROOM WITH TELEMETRY DAILY

## 2024-08-07 PROCEDURE — 2500000005 HC RX 250 GENERAL PHARMACY W/O HCPCS: Performed by: STUDENT IN AN ORGANIZED HEALTH CARE EDUCATION/TRAINING PROGRAM

## 2024-08-07 RX ORDER — HYDROMORPHONE HYDROCHLORIDE 1 MG/ML
0.2 INJECTION, SOLUTION INTRAMUSCULAR; INTRAVENOUS; SUBCUTANEOUS EVERY 2 HOUR PRN
Status: DISCONTINUED | OUTPATIENT
Start: 2024-08-07 | End: 2024-08-07

## 2024-08-07 RX ORDER — KETOROLAC TROMETHAMINE 15 MG/ML
15 INJECTION, SOLUTION INTRAMUSCULAR; INTRAVENOUS ONCE
Status: COMPLETED | OUTPATIENT
Start: 2024-08-07 | End: 2024-08-07

## 2024-08-07 RX ORDER — GABAPENTIN 100 MG/1
100 CAPSULE ORAL EVERY 8 HOURS SCHEDULED
Qty: 14 CAPSULE | Refills: 0 | Status: SHIPPED | OUTPATIENT
Start: 2024-08-07 | End: 2024-08-16 | Stop reason: ALTCHOICE

## 2024-08-07 RX ORDER — OXYCODONE HYDROCHLORIDE 5 MG/1
5 TABLET ORAL EVERY 6 HOURS PRN
Qty: 12 TABLET | Refills: 0 | Status: SHIPPED | OUTPATIENT
Start: 2024-08-07 | End: 2024-08-16 | Stop reason: SDUPTHER

## 2024-08-07 RX ORDER — OXYCODONE HYDROCHLORIDE 5 MG/1
5 TABLET ORAL EVERY 4 HOURS PRN
Status: DISCONTINUED | OUTPATIENT
Start: 2024-08-07 | End: 2024-08-08 | Stop reason: HOSPADM

## 2024-08-07 RX ORDER — METHOCARBAMOL 750 MG/1
750 TABLET, FILM COATED ORAL EVERY 8 HOURS
Qty: 6 TABLET | Refills: 0 | Status: SHIPPED | OUTPATIENT
Start: 2024-08-07 | End: 2024-08-19 | Stop reason: WASHOUT

## 2024-08-07 RX ORDER — ACETAMINOPHEN 325 MG/1
650 TABLET ORAL EVERY 6 HOURS PRN
COMMUNITY
Start: 2024-08-07

## 2024-08-07 RX ORDER — METOPROLOL TARTRATE 25 MG/1
12.5 TABLET, FILM COATED ORAL 2 TIMES DAILY
Qty: 30 TABLET | Refills: 0 | Status: SHIPPED | OUTPATIENT
Start: 2024-08-07 | End: 2024-09-04 | Stop reason: SDUPTHER

## 2024-08-07 RX ORDER — LIDOCAINE 560 MG/1
1 PATCH PERCUTANEOUS; TOPICAL; TRANSDERMAL DAILY
Qty: 5 PATCH | Refills: 0 | Status: SHIPPED | OUTPATIENT
Start: 2024-08-08 | End: 2024-08-16 | Stop reason: ALTCHOICE

## 2024-08-07 RX ORDER — GABAPENTIN 100 MG/1
100 CAPSULE ORAL EVERY 8 HOURS SCHEDULED
Status: DISCONTINUED | OUTPATIENT
Start: 2024-08-07 | End: 2024-08-08 | Stop reason: HOSPADM

## 2024-08-07 RX ORDER — LIDOCAINE 560 MG/1
1 PATCH PERCUTANEOUS; TOPICAL; TRANSDERMAL DAILY
Status: DISCONTINUED | OUTPATIENT
Start: 2024-08-07 | End: 2024-08-08 | Stop reason: HOSPADM

## 2024-08-07 RX ORDER — KETOROLAC TROMETHAMINE 10 MG/1
10 TABLET, FILM COATED ORAL EVERY 6 HOURS PRN
Qty: 12 TABLET | Refills: 0 | Status: SHIPPED | OUTPATIENT
Start: 2024-08-07 | End: 2024-08-10

## 2024-08-07 RX ORDER — ASPIRIN 81 MG/1
81 TABLET ORAL DAILY
Qty: 30 TABLET | Refills: 0 | Status: SHIPPED | OUTPATIENT
Start: 2024-08-07 | End: 2024-09-16 | Stop reason: HOSPADM

## 2024-08-07 RX ORDER — KETOROLAC TROMETHAMINE 10 MG/1
10 TABLET, FILM COATED ORAL EVERY 4 HOURS PRN
Status: DISCONTINUED | OUTPATIENT
Start: 2024-08-07 | End: 2024-08-08 | Stop reason: HOSPADM

## 2024-08-07 RX ORDER — POLYETHYLENE GLYCOL 3350 17 G/17G
17 POWDER, FOR SOLUTION ORAL 2 TIMES DAILY PRN
COMMUNITY
Start: 2024-08-07 | End: 2024-08-16 | Stop reason: ALTCHOICE

## 2024-08-07 RX ADMIN — METHOCARBAMOL 750 MG: 500 TABLET ORAL at 06:01

## 2024-08-07 RX ADMIN — SENNOSIDES AND DOCUSATE SODIUM 2 TABLET: 50; 8.6 TABLET ORAL at 09:43

## 2024-08-07 RX ADMIN — ASPIRIN 81 MG 81 MG: 81 TABLET ORAL at 09:44

## 2024-08-07 RX ADMIN — POLYETHYLENE GLYCOL 3350 17 G: 17 POWDER, FOR SOLUTION ORAL at 09:42

## 2024-08-07 RX ADMIN — ACETAMINOPHEN 650 MG: 325 TABLET ORAL at 02:15

## 2024-08-07 RX ADMIN — GABAPENTIN 100 MG: 100 CAPSULE ORAL at 09:45

## 2024-08-07 RX ADMIN — HYDROMORPHONE HYDROCHLORIDE 0.2 MG: 1 INJECTION, SOLUTION INTRAMUSCULAR; INTRAVENOUS; SUBCUTANEOUS at 06:03

## 2024-08-07 RX ADMIN — ACETAMINOPHEN 650 MG: 325 TABLET ORAL at 15:57

## 2024-08-07 RX ADMIN — ACETAMINOPHEN 650 MG: 325 TABLET ORAL at 09:43

## 2024-08-07 RX ADMIN — GABAPENTIN 100 MG: 100 CAPSULE ORAL at 18:16

## 2024-08-07 RX ADMIN — METHOCARBAMOL 750 MG: 500 TABLET ORAL at 14:46

## 2024-08-07 RX ADMIN — HYDROMORPHONE HYDROCHLORIDE 0.2 MG: 1 INJECTION, SOLUTION INTRAMUSCULAR; INTRAVENOUS; SUBCUTANEOUS at 02:51

## 2024-08-07 RX ADMIN — SENNOSIDES AND DOCUSATE SODIUM 2 TABLET: 50; 8.6 TABLET ORAL at 20:25

## 2024-08-07 RX ADMIN — METOPROLOL TARTRATE 12.5 MG: 25 TABLET, FILM COATED ORAL at 09:43

## 2024-08-07 RX ADMIN — METOPROLOL TARTRATE 12.5 MG: 25 TABLET, FILM COATED ORAL at 20:25

## 2024-08-07 RX ADMIN — OXYCODONE HYDROCHLORIDE 5 MG: 5 TABLET ORAL at 09:50

## 2024-08-07 RX ADMIN — KETOROLAC TROMETHAMINE 10 MG: 10 TABLET, FILM COATED ORAL at 14:45

## 2024-08-07 RX ADMIN — TRAZODONE HYDROCHLORIDE 100 MG: 50 TABLET ORAL at 20:25

## 2024-08-07 RX ADMIN — VENLAFAXINE HYDROCHLORIDE 37.5 MG: 37.5 CAPSULE, EXTENDED RELEASE ORAL at 09:43

## 2024-08-07 RX ADMIN — METHOCARBAMOL 750 MG: 500 TABLET ORAL at 20:25

## 2024-08-07 RX ADMIN — KETOROLAC TROMETHAMINE 15 MG: 15 INJECTION, SOLUTION INTRAMUSCULAR; INTRAVENOUS at 09:43

## 2024-08-07 RX ADMIN — OXYCODONE HYDROCHLORIDE 5 MG: 5 TABLET ORAL at 14:46

## 2024-08-07 RX ADMIN — KETOROLAC TROMETHAMINE 10 MG: 10 TABLET, FILM COATED ORAL at 20:24

## 2024-08-07 RX ADMIN — OXYCODONE HYDROCHLORIDE 10 MG: 5 TABLET ORAL at 01:17

## 2024-08-07 RX ADMIN — Medication 1 TABLET: at 09:43

## 2024-08-07 RX ADMIN — ACETAMINOPHEN 650 MG: 325 TABLET ORAL at 20:24

## 2024-08-07 RX ADMIN — HEPARIN SODIUM 5000 UNITS: 5000 INJECTION INTRAVENOUS; SUBCUTANEOUS at 09:43

## 2024-08-07 RX ADMIN — HEPARIN SODIUM 5000 UNITS: 5000 INJECTION INTRAVENOUS; SUBCUTANEOUS at 18:16

## 2024-08-07 RX ADMIN — LIDOCAINE 1 PATCH: 4 PATCH TOPICAL at 09:43

## 2024-08-07 RX ADMIN — HEPARIN SODIUM 5000 UNITS: 5000 INJECTION INTRAVENOUS; SUBCUTANEOUS at 02:15

## 2024-08-07 ASSESSMENT — PAIN SCALES - GENERAL
PAINLEVEL_OUTOF10: 8
PAINLEVEL_OUTOF10: 10 - WORST POSSIBLE PAIN
PAINLEVEL_OUTOF10: 5 - MODERATE PAIN
PAINLEVEL_OUTOF10: 9
PAINLEVEL_OUTOF10: 8

## 2024-08-07 ASSESSMENT — COGNITIVE AND FUNCTIONAL STATUS - GENERAL
DAILY ACTIVITIY SCORE: 24
CLIMB 3 TO 5 STEPS WITH RAILING: A LITTLE
MOBILITY SCORE: 23

## 2024-08-07 ASSESSMENT — PAIN - FUNCTIONAL ASSESSMENT
PAIN_FUNCTIONAL_ASSESSMENT: 0-10

## 2024-08-07 ASSESSMENT — PAIN DESCRIPTION - LOCATION
LOCATION: CHEST
LOCATION: RIB CAGE

## 2024-08-07 ASSESSMENT — PAIN DESCRIPTION - ORIENTATION
ORIENTATION: RIGHT
ORIENTATION: RIGHT

## 2024-08-07 ASSESSMENT — PAIN DESCRIPTION - DESCRIPTORS: DESCRIPTORS: SHARP

## 2024-08-07 NOTE — PROGRESS NOTES
"CARDIAC SURGERY DAILY PROGRESS NOTE    Maribel Lakhani is a 63 y/o female with PMHx of anxiety, depression, lumbar spondylosis, insomnia, high cholesterol, myxoma of the heart, and malignant neoplasm of the transverse colon, who presented to [facility] on 8/2/2024 for planned cardiac surgery.     Admitted to CTICU 8/2 s/p minimally invasive excision of myxoma.    OPERATION/PROCEDURE: 8/2/2024 with Rosaura Morales   1. Mini Thoracotomy.  2. Excision of left Atrial Myxoma.  3. Right femoral cutdown.  4. Right Femoral and Atrial Cannulation  5. Direct repair of right femoral artery.    CTICU Course: Uneventful   Transfer to LT3: 8/3/2024  =================    Interval History:   No events overnight    SUBJECTIVE:  - Continue to have significant pain control issues    Objective   /70 (BP Location: Right arm, Patient Position: Sitting)   Pulse 63   Temp 36.3 °C (97.3 °F) (Temporal)   Resp 18   Ht 1.727 m (5' 8\")   Wt 70.7 kg (155 lb 12.8 oz)   SpO2 96%   BMI 23.69 kg/m²   0-10 (Numeric) Pain Score: 8   3 Day Weight Change: Unable to Calculate    Intake and Output    Intake/Output Summary (Last 24 hours) at 8/7/2024 1206  Last data filed at 8/7/2024 0927  Gross per 24 hour   Intake 920 ml   Output 675 ml   Net 245 ml       Physical Exam  Vitals and nursing note reviewed.   Constitutional:       General: She is not in acute distress.     Appearance: Normal appearance. She is normal weight. She is not ill-appearing.      Comments: Resting in bed without acute distress    Eyes:      Extraocular Movements: Extraocular movements intact.   Cardiovascular:      Rate and Rhythm: Normal rate and regular rhythm.      Pulses: Normal pulses.      Heart sounds: No murmur heard.     No gallop.      Comments: +2 Equal and even pulses in all extremities   V wires - connected to external pacer OFF but connected   Pulmonary:      Effort: Pulmonary effort is normal. No respiratory distress.      Breath sounds: Normal " breath sounds. No wheezing.      Comments: Equal chest expansion, non-labored   Diminished breath sounds in bilateral bases   Good inspiratory effort on Room Air     Abdominal:      General: Bowel sounds are normal. There is no distension.      Palpations: Abdomen is soft. There is no mass.      Tenderness: There is no abdominal tenderness.      Comments: Awaiting postop BM    Musculoskeletal:      Right lower leg: No edema.      Left lower leg: No edema.      Comments: CARMELO  Ambulates throughout the halls unassisted    Skin:     General: Skin is warm and dry.      Capillary Refill: Capillary refill takes less than 2 seconds.      Coloration: Skin is not jaundiced or pale.      Findings: No bruising.      Comments: Right Thoracotomy site: well approximated, no s/s of infection or bleeding - SINTIA    Neurological:      General: No focal deficit present.      Mental Status: She is oriented to person, place, and time.   Psychiatric:         Mood and Affect: Mood normal.         Behavior: Behavior normal.       Medications  Scheduled medications  acetaminophen, 650 mg, oral, q6h  aspirin, 81 mg, oral, Daily  gabapentin, 100 mg, oral, q8h NATALIA  heparin (porcine), 5,000 Units, subcutaneous, q8h  lidocaine, 1 patch, transdermal, Daily  methocarbamol, 750 mg, oral, q8h  metoprolol tartrate, 12.5 mg, oral, BID  multivitamin with minerals, 1 tablet, oral, Daily  polyethylene glycol, 17 g, oral, BID  sennosides-docusate sodium, 2 tablet, oral, BID  traZODone, 100 mg, oral, Nightly  venlafaxine XR, 37.5 mg, oral, Daily with breakfast    Continuous medications   PRN medications  PRN medications: dextrose **OR** glucagon, ketorolac, magnesium hydroxide, naloxone, [DISCONTINUED] ondansetron **OR** ondansetron, oxyCODONE, oxygen    LABS:  CMP:  Results from last 7 days   Lab Units 08/07/24  0722 08/06/24  0850 08/05/24  0751 08/04/24  0404 08/03/24  0323 08/02/24  1328 07/31/24  1459   SODIUM mmol/L 139 139 137 136 141 143 139    POTASSIUM mmol/L 4.3 4.0 4.2 3.9 3.6 4.1 4.4   CHLORIDE mmol/L 104 105 103 100 107 109* 103   CO2 mmol/L 28 26 30 29 26 25 25   ANION GAP mmol/L 11 12 8* 11 12 13 15   BUN mg/dL 6 8 6 9 7 9 17   CREATININE mg/dL 0.54 0.43* 0.42* 0.65 0.45* 0.51 0.61   EGFR mL/min/1.73m*2 >90 >90 >90 >90 >90 >90 >90   MAGNESIUM mg/dL 1.86 1.74 1.93 1.96 2.11 3.33*  --    ALBUMIN g/dL 3.0* 2.9* 3.0* 3.4 3.2* 3.2* 3.9   ALT U/L  --   --   --   --   --   --  15   AST U/L  --   --   --   --   --   --  20   BILIRUBIN TOTAL mg/dL  --   --   --   --   --   --  0.4     CBC:  Results from last 7 days   Lab Units 08/07/24  0722 08/06/24  0850 08/05/24  0751 08/04/24  0404 08/03/24  0323 08/02/24  1328 07/31/24  1459   WBC AUTO x10*3/uL 3.5* 4.1* 4.5 6.7 6.9 9.2 6.7   HEMOGLOBIN g/dL 9.2* 8.8* 8.3* 9.7* 9.9* 10.8* 12.2   HEMATOCRIT % 28.0* 26.6* 25.4* 30.2* 30.4* 33.0* 36.3   PLATELETS AUTO x10*3/uL 194 152 115* 127* 126* 128* 217   MCV fL 95 97 96 99 98 97 93     COAG:   Results from last 7 days   Lab Units 08/02/24  1328 07/31/24  1459   INR  1.2* 1.0     HEME/ENDO:  Results from last 7 days   Lab Units 07/31/24  1459   HEMOGLOBIN A1C % 4.9      CARDIAC:          IMAGING/ DIAGNOSTIC TESTING:  I have personally reviewed the following test result(s):    No results found.      IMPRESSION & PLAN:  POD # 6 s/p excision of right atrial Myxoma with Dr. Morales   - Encourage activity/ambulation; PT/OT  - Cardiac rehab referral   - Continue cardiac meds: ASA, BB  --- No indication for statin   - Pain and anticonstipation meds  - 2v CXR reviewed with some vascular congestion otherwise clear  - No indication for post op ECHO   - CUT epicardial wires prior to discharge; per surgeon preference   - Tele until discharge  - Optimize nutrition and electrolytes    SR with runs of SVT  - 8/3: Overnight: Tele showed frequent periods of 3-5 beat runs of SVT; treated with metoprolol, magnesium, potassium - resolved   - Tele: SR 60's to 70's; no episodes of SVT   -  Continue BB: Metoprolol tartrate 12.5mg BID     Acute Blood Loss Anemia - stable  Recent Labs     08/07/24  0722 08/06/24  0850 08/05/24  0751 08/04/24  0404 08/03/24  0323 08/02/24  1328 07/31/24  1459   HGB 9.2* 8.8* 8.3* 9.7* 9.9* 10.8* 12.2   HCT 28.0* 26.6* 25.4* 30.2* 30.4* 33.0* 36.3   - MV, PO Iron x1mo  - Daily labs, transfuse as indicated    Thrombocytopenia- resolved  Recent Labs     08/07/24  0722 08/06/24  0850 08/05/24  0751 08/04/24  0404 08/03/24  0323 08/02/24  1328 07/31/24  1459    152 115* 127* 126* 128* 217   Etiology likely postop/CPB related  - Trend with daily CBCs    Volume/Electrolyte Status: Preop wt Weight: 67.8 kg (149 lb 7.6 oz)   Vitals:    08/07/24 0603   Weight: 70.7 kg (155 lb 12.8 oz)     - Weight: 70kg  - Replete electrolytes as indicated  - Daily weights/strict I&Os  - No indication for diuresis at this time  - Daily RFP     Hyperlipidemia: home meds: Zetia 10mg nightly   Lab Results   Component Value Date    CHOL 191 08/28/2023    HDL 64.3 08/28/2023    VLDL 14 08/28/2023    TRIG 71 08/28/2023   - continue home Zetia   - follow up lipid panel with PCP/ cardiologist for ongoing lipid management    Anxiety/Depression: Home meds: Trazodone 100mg daily; Effexor 37.5mg daily   -continue home medications as above   - Home Trazodone nightly  - Home Effexor daily  -sleep/ wake cycle hygiene     Arthritis: Home Meds: Voltaren Topical Gel   - Continue home Voltaren PRN   - NO NSAIDs for 3 months s/p cardiac surgery   - PT/OT   - Hot/cold packs     Acute Postop Pain   - Acute Pain consulted, appreciate recs: - will follow up on plans for dc'ing catheter  --- 8/3: R ARIS with catheter blocks placed/Ambit ball with Ropivacaine 0.2%/NaCl 0.9% 500mL, Rate 10 cc/hr bilaterally - removed 8/5/24  --- Rest of the pain medication as per primary team  - Start toradol Ivx1 and oral PRN  - Start gabapentin 100mg TID x 5 days  - DC dilaudid IV  - Continue lidoderm patch to chest wall    VTE  Prophylaxis: SCDs/TEDs, ambulation, SQ heparin   Code Status: Full Code    Dispo  - PT/OT recs low intensity level of continued care; home   - Would benefit from homecare RN for cardiac surgery carepath  - If pain better controlled discharge home tomorrow  - Will continue to assess discharge needs      Ramu Mccloud PA-C  Cardiac Surgery RADHA  [facility]  Team Phone [phone]    8/7/2024  12:06 PM

## 2024-08-08 ENCOUNTER — APPOINTMENT (OUTPATIENT)
Dept: CARDIOLOGY | Facility: HOSPITAL | Age: 64
End: 2024-08-08
Payer: COMMERCIAL

## 2024-08-08 ENCOUNTER — DOCUMENTATION (OUTPATIENT)
Dept: HOME HEALTH SERVICES | Facility: HOME HEALTH | Age: 64
End: 2024-08-08
Payer: COMMERCIAL

## 2024-08-08 VITALS
SYSTOLIC BLOOD PRESSURE: 109 MMHG | TEMPERATURE: 97.9 F | OXYGEN SATURATION: 96 % | BODY MASS INDEX: 23.7 KG/M2 | WEIGHT: 156.4 LBS | RESPIRATION RATE: 18 BRPM | DIASTOLIC BLOOD PRESSURE: 68 MMHG | HEART RATE: 68 BPM | HEIGHT: 68 IN

## 2024-08-08 LAB
ALBUMIN SERPL BCP-MCNC: 3 G/DL (ref 3.4–5)
ANION GAP SERPL CALC-SCNC: 13 MMOL/L (ref 10–20)
ATRIAL RATE: 57 BPM
ATRIAL RATE: 66 BPM
ATRIAL RATE: 80 BPM
BUN SERPL-MCNC: 14 MG/DL (ref 6–23)
CALCIUM SERPL-MCNC: 8.9 MG/DL (ref 8.6–10.6)
CHLORIDE SERPL-SCNC: 105 MMOL/L (ref 98–107)
CO2 SERPL-SCNC: 25 MMOL/L (ref 21–32)
CREAT SERPL-MCNC: 0.53 MG/DL (ref 0.5–1.05)
EGFRCR SERPLBLD CKD-EPI 2021: >90 ML/MIN/1.73M*2
ERYTHROCYTE [DISTWIDTH] IN BLOOD BY AUTOMATED COUNT: 13.2 % (ref 11.5–14.5)
GLUCOSE SERPL-MCNC: 87 MG/DL (ref 74–99)
HCT VFR BLD AUTO: 27.9 % (ref 36–46)
HGB BLD-MCNC: 9.3 G/DL (ref 12–16)
MAGNESIUM SERPL-MCNC: 1.84 MG/DL (ref 1.6–2.4)
MCH RBC QN AUTO: 32.1 PG (ref 26–34)
MCHC RBC AUTO-ENTMCNC: 33.3 G/DL (ref 32–36)
MCV RBC AUTO: 96 FL (ref 80–100)
NRBC BLD-RTO: 0 /100 WBCS (ref 0–0)
P AXIS: 117 DEGREES
P AXIS: 43 DEGREES
P OFFSET: 171 MS
P OFFSET: 173 MS
P OFFSET: 202 MS
P ONSET: 114 MS
P ONSET: 123 MS
P ONSET: 138 MS
PHOSPHATE SERPL-MCNC: 5.2 MG/DL (ref 2.5–4.9)
PLATELET # BLD AUTO: 230 X10*3/UL (ref 150–450)
POTASSIUM SERPL-SCNC: 4.1 MMOL/L (ref 3.5–5.3)
PR INTERVAL: 172 MS
PR INTERVAL: 178 MS
PR INTERVAL: 182 MS
Q ONSET: 205 MS
Q ONSET: 212 MS
Q ONSET: 224 MS
QRS COUNT: 10 BEATS
QRS COUNT: 11 BEATS
QRS COUNT: 13 BEATS
QRS DURATION: 84 MS
QRS DURATION: 84 MS
QRS DURATION: 98 MS
QT INTERVAL: 394 MS
QT INTERVAL: 410 MS
QT INTERVAL: 438 MS
QTC CALCULATION(BAZETT): 426 MS
QTC CALCULATION(BAZETT): 429 MS
QTC CALCULATION(BAZETT): 454 MS
QTC FREDERICIA: 423 MS
QTC FREDERICIA: 430 MS
QTC FREDERICIA: 434 MS
R AXIS: -50 DEGREES
R AXIS: 204 DEGREES
R AXIS: 66 DEGREES
RBC # BLD AUTO: 2.9 X10*6/UL (ref 4–5.2)
SODIUM SERPL-SCNC: 139 MMOL/L (ref 136–145)
T AXIS: 172 DEGREES
T AXIS: 42 DEGREES
T AXIS: 50 DEGREES
T OFFSET: 409 MS
T OFFSET: 410 MS
T OFFSET: 443 MS
VENTRICULAR RATE: 57 BPM
VENTRICULAR RATE: 66 BPM
VENTRICULAR RATE: 80 BPM
WBC # BLD AUTO: 3.7 X10*3/UL (ref 4.4–11.3)

## 2024-08-08 PROCEDURE — 93005 ELECTROCARDIOGRAM TRACING: CPT

## 2024-08-08 PROCEDURE — 83735 ASSAY OF MAGNESIUM: CPT

## 2024-08-08 PROCEDURE — 2500000001 HC RX 250 WO HCPCS SELF ADMINISTERED DRUGS (ALT 637 FOR MEDICARE OP): Performed by: STUDENT IN AN ORGANIZED HEALTH CARE EDUCATION/TRAINING PROGRAM

## 2024-08-08 PROCEDURE — 36415 COLL VENOUS BLD VENIPUNCTURE: CPT

## 2024-08-08 PROCEDURE — 2500000001 HC RX 250 WO HCPCS SELF ADMINISTERED DRUGS (ALT 637 FOR MEDICARE OP): Performed by: PHYSICIAN ASSISTANT

## 2024-08-08 PROCEDURE — 2500000001 HC RX 250 WO HCPCS SELF ADMINISTERED DRUGS (ALT 637 FOR MEDICARE OP)

## 2024-08-08 PROCEDURE — 2500000004 HC RX 250 GENERAL PHARMACY W/ HCPCS (ALT 636 FOR OP/ED)

## 2024-08-08 PROCEDURE — 85027 COMPLETE CBC AUTOMATED: CPT

## 2024-08-08 PROCEDURE — 80069 RENAL FUNCTION PANEL: CPT

## 2024-08-08 PROCEDURE — 2500000005 HC RX 250 GENERAL PHARMACY W/O HCPCS: Performed by: STUDENT IN AN ORGANIZED HEALTH CARE EDUCATION/TRAINING PROGRAM

## 2024-08-08 PROCEDURE — 99238 HOSP IP/OBS DSCHRG MGMT 30/<: CPT | Performed by: PHYSICIAN ASSISTANT

## 2024-08-08 RX ADMIN — GABAPENTIN 100 MG: 100 CAPSULE ORAL at 03:06

## 2024-08-08 RX ADMIN — ACETAMINOPHEN 650 MG: 325 TABLET ORAL at 03:06

## 2024-08-08 RX ADMIN — Medication 1 TABLET: at 08:43

## 2024-08-08 RX ADMIN — VENLAFAXINE HYDROCHLORIDE 37.5 MG: 37.5 CAPSULE, EXTENDED RELEASE ORAL at 08:43

## 2024-08-08 RX ADMIN — METOPROLOL TARTRATE 12.5 MG: 25 TABLET, FILM COATED ORAL at 08:45

## 2024-08-08 RX ADMIN — METHOCARBAMOL 750 MG: 500 TABLET ORAL at 03:06

## 2024-08-08 RX ADMIN — LIDOCAINE 1 PATCH: 4 PATCH TOPICAL at 08:44

## 2024-08-08 RX ADMIN — KETOROLAC TROMETHAMINE 10 MG: 10 TABLET, FILM COATED ORAL at 11:43

## 2024-08-08 RX ADMIN — KETOROLAC TROMETHAMINE 10 MG: 10 TABLET, FILM COATED ORAL at 05:45

## 2024-08-08 RX ADMIN — HEPARIN SODIUM 5000 UNITS: 5000 INJECTION INTRAVENOUS; SUBCUTANEOUS at 08:46

## 2024-08-08 RX ADMIN — ASPIRIN 81 MG 81 MG: 81 TABLET ORAL at 08:45

## 2024-08-08 RX ADMIN — ACETAMINOPHEN 650 MG: 325 TABLET ORAL at 08:43

## 2024-08-08 RX ADMIN — OXYCODONE HYDROCHLORIDE 5 MG: 5 TABLET ORAL at 01:00

## 2024-08-08 RX ADMIN — HEPARIN SODIUM 5000 UNITS: 5000 INJECTION INTRAVENOUS; SUBCUTANEOUS at 01:00

## 2024-08-08 ASSESSMENT — PAIN SCALES - GENERAL
PAINLEVEL_OUTOF10: 6
PAINLEVEL_OUTOF10: 4
PAINLEVEL_OUTOF10: 5 - MODERATE PAIN
PAINLEVEL_OUTOF10: 3
PAINLEVEL_OUTOF10: 4
PAINLEVEL_OUTOF10: 6

## 2024-08-08 ASSESSMENT — COGNITIVE AND FUNCTIONAL STATUS - GENERAL
MOBILITY SCORE: 24
DAILY ACTIVITIY SCORE: 24

## 2024-08-08 ASSESSMENT — PAIN - FUNCTIONAL ASSESSMENT
PAIN_FUNCTIONAL_ASSESSMENT: 0-10

## 2024-08-08 NOTE — PROGRESS NOTES
Home Care was informed of patient's plan to discharge home today.  Gracia Parkinson RN  [facility] confirmed start of care in 24-48 hrs.

## 2024-08-08 NOTE — OP NOTE
OPERATIVE REPORT     Date: 2024  OR Location: [facility] OR    Name: Maribel Lakhani, : 1960, Age: 64 y.o., MRN: [id number], Sex: female    Diagnosis  Pre-op Diagnosis      * Myxoma of heart ([facility]-HCC) [D15.1]  Active Problems:  There are no active Hospital Problems.   Post-op Diagnosis     * Myxoma of heart ([facility]HS-HCC) [D15.1]  Active Problems:  There are no active Hospital Problems.       Procedures     Excision of left Atrial Myxoma via right mini thoracotomy   Right femoral cutdown with direct cannulation of femoral artery and vein   Primary closure of right femoral artery and vein       Surgeons   Rosaura Morales MD      Resident/Fellow/Other Assistant:  Surgeons and Role:     * Chuck Billy MD - Assisting - There was no qualified resident available to assist with the case.  Dr. Billy assisted in all aspects of the case.      Procedure Summary  Anesthesia: General  ASA: IV  Anesthesia Staff:   Anesthesiologist: Albert Salguero MD  C-AA: LUIS Potter  Perfusionist: Juvenal Chavez  ABIDA: Jose Polanco    Estimated Blood Loss: 250mL    Specimen: No specimens collected     Staff:   Circulator: Areli Fung RN  Relief Circulator: Terri Marshall RN  Relief Scrub: Negar Campbell RN  Scrub Person: Rowena Shrestha        Findings: complete excision of atrial myxoma     Indications: Maribel Lakhani is an 64 y.o. female with hx of left atrial myxoma with plan for minimally invasive excision of the mass.     The risks benefits and alternatives were discussed with the patient and include but are not limited to, bleeding, infection, injury to other structures, need for re-operation, arrhythmia, respiratory failure, prolonged intubation, stroke, and death.  These were discussed with the patient in detail, all questions were answered and informed consent was obtained.      Procedure Details:   The patient was taken to the operating room by anesthesia, placed supine on the operating  table, intubated with a double lumen tube and placed under general anesthesia.  An a-line and central line were placed.  A small groin crease incision was made overlying the right femoral artery and vein.  These were cannulated using a 17-Algerian arterial and 25-Algerian venous cannula under FABIOLA guidance. A right mini thoracotomy was made.  The right lung was deflated and cardiopulmonary bypass was started after full anticoagulation. The pericardium was opened and the aortic DeTach cross-clamp was applied and DelNido cardioplegia was infused antegradely into the aortic root, resulting in prompt electromechanical arrest of the heart.     Left atriotomy was performed following dissection of Sondergaard groove.  Retraction sutures were place in the septum and the mass was exposed.  Sharp dissection was used to remove the attachment at the level of the septum resection a small portion of the septum en block with the mass.  Once removed the mass was sent to pathology.  The septum was closed with interrupted 4-0 prolene pledged sutures.  The heart was filled and the closure was assessed for completeness.  Examination of the mitral valve revealed normal structure and function.   A ventricular pacing wire was placed.  Following de-airing with the aid of CO2, the aortic cross-clamp was removed.  The heart resumed activity in sinus rhythm.  Cardiopulmonary bypass was weaned uneventfully and FABIOLA demonstrated excellent result with no defect in the atrial septum and normal mitral function.   Protamine was administered to reverse systemic anticoagulation and hemostasis was secured.  One Walt drain was inserted into the pleural cavity and the wound was closed using 1 figure-of-eight size 5 Ethibond pericostal suture and closure in layers using vicryl and subcuticular skin closure.     After decannulation, the femoral artery and vein were repaired using 5-0 Prolene sutures.  The groin wound was closed in layers using Vicryl and  subcuticular skin closure.     The patient remained stable and was transferred to the Cardiothoracic Intensive Care Unit in a stable cardiorespiratory condition.  I was available for all aspects of the procedure preoperatively and postoperatively.                      Rosaura Morales MD  Cardiac Surgery  08/08/24  8:51 AM

## 2024-08-08 NOTE — HH CARE COORDINATION
Home Care received a Referral for Nursing and Physical Therapy. We have processed the referral for a Start of Care on 8/9-8/10.     If you have any questions or concerns, please feel free to contact us at [phone]. Follow the prompts, enter your five digit zip code, and you will be directed to your care team on [facility].

## 2024-08-08 NOTE — CARE PLAN
The patient's goals for the shift include      Problem: Skin  Goal: Decreased wound size/increased tissue granulation at next dressing change  Outcome: Progressing  Goal: Participates in plan/prevention/treatment measures  Outcome: Progressing  Goal: Prevent/manage excess moisture  Outcome: Progressing  Goal: Prevent/minimize sheer/friction injuries  Outcome: Progressing  Goal: Promote/optimize nutrition  Outcome: Progressing  Goal: Promote skin healing  Outcome: Progressing     Problem: Pain - Adult  Goal: Verbalizes/displays adequate comfort level or baseline comfort level  Outcome: Progressing     Problem: Safety - Adult  Goal: Free from fall injury  Outcome: Progressing     The clinical goals for the shift include Pt will remain hemodynamically stable this shift

## 2024-08-08 NOTE — CARE PLAN
The patient's goals for the shift include pain control    The clinical goals for the shift include pain control    Problem: Skin  Goal: Decreased wound size/increased tissue granulation at next dressing change  Outcome: Progressing  Goal: Participates in plan/prevention/treatment measures  Outcome: Progressing  Goal: Prevent/manage excess moisture  Outcome: Progressing  Goal: Prevent/minimize sheer/friction injuries  Outcome: Progressing  Goal: Promote/optimize nutrition  Outcome: Progressing  Goal: Promote skin healing  Outcome: Progressing     Problem: Pain - Adult  Goal: Verbalizes/displays adequate comfort level or baseline comfort level  Outcome: Progressing     Problem: Safety - Adult  Goal: Free from fall injury  Outcome: Progressing     Problem: Discharge Planning  Goal: Discharge to home or other facility with appropriate resources  Outcome: Progressing     Problem: Chronic Conditions and Co-morbidities  Goal: Patient's chronic conditions and co-morbidity symptoms are monitored and maintained or improved  Outcome: Progressing     Problem: Meds/Post-op Pain  Goal: Pain controlled to tolerate pain level  Outcome: Progressing  Goal: Tolerates prescribed medication  Outcome: Progressing     Problem: DVT/VTE Prevention/Activity  Goal: No decrease in circulation/sensation  Outcome: Progressing  Goal: Prevent skin breakdown  Outcome: Progressing  Goal: Return to preop oxygenation status  Outcome: Progressing  Goal: Tolerates optimal activity  Outcome: Progressing  Goal: Increase self care and/or family involvement in 24 hrs.  Outcome: Progressing     Problem: Wound care/infection prevention  Goal: No signs of infection in 24 hrs.  Outcome: Progressing  Goal: No unexpected bleeding from incision this shift  Outcome: Progressing     Problem: Diet/fluid balance  Goal: Adequate urinary output  Outcome: Progressing  Goal: Free from nausea/vomiting  Outcome: Progressing  Goal: Return in bowel function  Outcome:  Progressing  Goal: Tolerates prescribed diet  Outcome: Progressing     Problem: Other goals  Goal: No change in neurological status  Outcome: Progressing  Goal: Stabilize vital signs (return to 10% of baseline)  Outcome: Progressing

## 2024-08-08 NOTE — DISCHARGE SUMMARY
Discharge Diagnosis  Myxoma of heart ([facility]-H[facility])    Issues Requiring Follow-Up  N/a    Test Results Pending At Discharge  Pending Labs       Order Current Status    Magnesium In process    Renal Function Panel In process    Surgical Pathology Exam In process            Hospital Course  IMPRESSION & PLAN:  POD # 7 s/p excision of right atrial Myxoma with Dr. Morales   - Encourage activity/ambulation; PT/OT  - Cardiac rehab referral   - Continue cardiac meds: ASA, BB  --- No indication for statin     - 2v CXR reviewed with some vascular congestion otherwise clear  - No indication for post op ECHO   - Epicardial wire cut yesterday without complication    SR with runs of SVT  - 8/3: Overnight: Tele showed frequent periods of 3-5 beat runs of SVT; treated with metoprolol, magnesium, potassium - resolved   - Tele: SR 60's to 70's; no episodes of SVT   - Continue BB: Metoprolol tartrate 12.5mg BID      Acute Blood Loss Anemia - stable            Recent Labs     08/07/24  0722 08/06/24  0850 08/05/24  0751 08/04/24  0404 08/03/24  0323 08/02/24  1328 07/31/24  1459   HGB 9.2* 8.8* 8.3* 9.7* 9.9* 10.8* 12.2   HCT 28.0* 26.6* 25.4* 30.2* 30.4* 33.0* 36.3   - MV, PO Iron x1mo  - Daily labs, transfuse as indicated     Thrombocytopenia- resolved            Recent Labs     08/07/24  0722 08/06/24  0850 08/05/24  0751 08/04/24  0404 08/03/24  0323 08/02/24  1328 07/31/24  1459    152 115* 127* 126* 128* 217   Etiology likely postop/CPB related  - Trend with daily CBCs      Hyperlipidemia: home meds: Zetia 10mg nightly         Lab Results   Component Value Date     CHOL 191 08/28/2023     HDL 64.3 08/28/2023     VLDL 14 08/28/2023     TRIG 71 08/28/2023   - continue home Zetia   - follow up lipid panel with PCP/ cardiologist for ongoing lipid management     Anxiety/Depression: Home meds: Trazodone 100mg daily; Effexor 37.5mg daily   -continue home medications as above   - Home Trazodone nightly  - Home Effexor daily  -sleep/ wake  cycle hygiene     Arthritis: Home Meds: Voltaren Topical Gel   - Continue home Voltaren PRN   - PT/OT   - Hot/cold packs      Acute Postop Pain   - Acute Pain consulted, appreciate recs: - will follow up on plans for dc'ing catheter  --- 8/3: R ARIS with catheter blocks placed/Ambit ball with Ropivacaine 0.2%/NaCl 0.9% 500mL, Rate 10 cc/hr bilaterally - removed 8/5/24  --- Rest of the pain medication as per primary team  - Oral toradol x3 days on discharge  - DC home on gabapentin 100mg TID x 5 days  - DC with tylenol     Pertinent Physical Exam At Time of Discharge  Physical Exam  Constitutional:       General: She is not in acute distress.     Appearance: She is not toxic-appearing.   Eyes:      Pupils: Pupils are equal, round, and reactive to light.   Cardiovascular:      Rate and Rhythm: Normal rate and regular rhythm.      Pulses: Normal pulses.      Comments: Thoracotomy site well approximated and stable to palpation without erythema or drainage  Pulmonary:      Effort: Pulmonary effort is normal.   Abdominal:      General: Abdomen is flat. There is no distension.      Palpations: Abdomen is soft.      Tenderness: There is no abdominal tenderness.   Musculoskeletal:         General: Normal range of motion.      Right lower leg: No edema.      Left lower leg: No edema.   Skin:     General: Skin is warm and dry.   Neurological:      General: No focal deficit present.      Mental Status: She is alert and oriented to person, place, and time.   Psychiatric:         Mood and Affect: Mood normal.         Behavior: Behavior normal.         Home Medications     Medication List      START taking these medications     acetaminophen 325 mg tablet; Commonly known as: Tylenol; Take 2 tablets   (650 mg) by mouth every 6 hours if needed for mild pain (1 - 3).   aspirin 81 mg EC tablet; Take 1 tablet (81 mg) by mouth once daily.   gabapentin 100 mg capsule; Commonly known as: Neurontin; Take 1 capsule   (100 mg) by mouth every  8 hours for 14 doses.   ketorolac 10 mg tablet; Commonly known as: Toradol; Take 1 tablet (10   mg) by mouth every 6 hours if needed for moderate pain (4 - 6) for up to 3   days.   lidocaine 4 % patch; Place 1 patch over 12 hours on the skin once daily.   Remove & discard patch within 12 hours or as directed by MD.   methocarbamol 750 mg tablet; Commonly known as: Robaxin; Take 1 tablet   (750 mg) by mouth every 8 hours for 2 days.   metoprolol tartrate 25 mg tablet; Commonly known as: Lopressor; Take 0.5   tablets (12.5 mg) by mouth 2 times a day.   oxyCODONE 5 mg immediate release tablet; Commonly known as: Roxicodone;   Take 1 tablet (5 mg) by mouth every 6 hours if needed for severe pain (7 -   10) for up to 3 days.   polyethylene glycol 17 gram packet; Commonly known as: Glycolax,   Miralax; Take 17 g by mouth 2 times a day as needed (constipation).     CONTINUE taking these medications     ezetimibe 10 mg tablet; Commonly known as: Zetia; Take 1 tablet (10 mg)   by mouth once daily.   traZODone 100 mg tablet; Commonly known as: Desyrel; Take 1 tablet (100   mg) by mouth once daily.   venlafaxine XR 37.5 mg 24 hr capsule; Commonly known as: Effexor-XR;   Take 1 capsule (37.5 mg) by mouth once daily. Do not crush or chew.   WOMEN'S 50 PLUS MULTIVITAMIN ORAL     STOP taking these medications     chlorhexidine 0.12 % solution; Commonly known as: Peridex   chlorhexidine 4 % external liquid; Commonly known as: Hibiclens   diclofenac 75 mg EC tablet; Commonly known as: Voltaren       Outpatient Follow-Up  Future Appointments   Date Time Provider Department Center   8/29/2024  9:40 AM CARDS[facility] [id number] NURSE [id number] [facility]   9/6/2024  9:30 AM OBINNA Hearn [id number] [facility]   9/18/2024 10:20 AM Albert Berry MD [id number] [facility]   9/18/2024  2:15 PM Rosaura Morales MD [id number] [facility]   2/21/2025 10:00 AM Albert Berry MD [id number] [facility]       Ramu Mccloud PA-C

## 2024-08-10 ENCOUNTER — HOME CARE VISIT (OUTPATIENT)
Dept: HOME HEALTH SERVICES | Facility: HOME HEALTH | Age: 64
End: 2024-08-10
Payer: COMMERCIAL

## 2024-08-10 VITALS
BODY MASS INDEX: 23.19 KG/M2 | HEART RATE: 78 BPM | TEMPERATURE: 97.8 F | HEIGHT: 68 IN | DIASTOLIC BLOOD PRESSURE: 62 MMHG | OXYGEN SATURATION: 97 % | SYSTOLIC BLOOD PRESSURE: 118 MMHG | RESPIRATION RATE: 20 BRPM | WEIGHT: 153 LBS

## 2024-08-10 PROCEDURE — G0299 HHS/HOSPICE OF RN EA 15 MIN: HCPCS

## 2024-08-10 ASSESSMENT — ENCOUNTER SYMPTOMS
LOWEST PAIN SEVERITY IN PAST 24 HOURS: 2/10
LIMITED RANGE OF MOTION: 1
PAIN LOCATION - RELIEVING FACTORS: REST
PERSON REPORTING PAIN: PATIENT
DEPRESSION: 0
MUSCLE WEAKNESS: 1
FATIGUES EASILY: 1
PAIN LOCATION - PAIN QUALITY: SORE, ACHING
OCCASIONAL FEELINGS OF UNSTEADINESS: 0
PAIN: 1
PAIN SEVERITY GOAL: 0/10
HIGHEST PAIN SEVERITY IN PAST 24 HOURS: 4/10
CHANGE IN APPETITE: UNCHANGED
PAIN LOCATION - PAIN FREQUENCY: INFREQUENT
PAIN LOCATION: CHEST
FATIGUE: 1
LOWER EXTREMITY EDEMA: 1
PAIN LOCATION - PAIN DURATION: SINCE SURGERY
APPETITE LEVEL: GOOD
LOSS OF SENSATION IN FEET: 0
PAIN LOCATION - PAIN SEVERITY: 2/10

## 2024-08-10 ASSESSMENT — ACTIVITIES OF DAILY LIVING (ADL)
AMBULATION ASSISTANCE: STAND BY ASSIST
OASIS_M1830: 01
PHYSICAL TRANSFERS ASSESSED: 1
TOILETING: STAND BY ASSIST
TOILETING: 1
AMBULATION ASSISTANCE: 1
ENTERING_EXITING_HOME: STAND BY ASSIST
CURRENT_FUNCTION: STAND BY ASSIST

## 2024-08-10 NOTE — Clinical Note
Thank you      ----- Message -----  From: Batsheva Ocasio RN  Sent: 8/10/2024   8:25 PM EDT  To: Chacorta Douglas, PT; *      Patient agreeable to skilled nursing services, for homecare.  But declines PT.      To the office, for continuity of care, and seeing as we are only going to see this patient a few times, please keep me as the .    Sridevi ABURTO can see for blood work next week. Any day is good.

## 2024-08-12 ENCOUNTER — TELEPHONE (OUTPATIENT)
Dept: PRIMARY CARE | Facility: CLINIC | Age: 64
End: 2024-08-12
Payer: COMMERCIAL

## 2024-08-12 LAB
LABORATORY COMMENT REPORT: NORMAL
PATH REPORT.FINAL DX SPEC: NORMAL
PATH REPORT.GROSS SPEC: NORMAL
PATH REPORT.RELEVANT HX SPEC: NORMAL
PATH REPORT.TOTAL CANCER: NORMAL

## 2024-08-12 NOTE — TELEPHONE ENCOUNTER
TCM:Pt scheduled for hospital follow up on 8/16/24, Hospital f/u from heart surgery,dates of hospital stay was 8/2/24-8/8/24, yes to home care

## 2024-08-13 LAB
ATRIAL RATE: 80 BPM
P AXIS: 43 DEGREES
P OFFSET: 173 MS
P ONSET: 123 MS
PR INTERVAL: 178 MS
Q ONSET: 212 MS
QRS COUNT: 13 BEATS
QRS DURATION: 84 MS
QT INTERVAL: 394 MS
QTC CALCULATION(BAZETT): 454 MS
QTC FREDERICIA: 434 MS
R AXIS: -50 DEGREES
T AXIS: 50 DEGREES
T OFFSET: 409 MS
VENTRICULAR RATE: 80 BPM

## 2024-08-14 ENCOUNTER — HOME CARE VISIT (OUTPATIENT)
Dept: HOME HEALTH SERVICES | Facility: HOME HEALTH | Age: 64
End: 2024-08-14
Payer: COMMERCIAL

## 2024-08-14 ENCOUNTER — LAB (OUTPATIENT)
Dept: LAB | Facility: LAB | Age: 64
End: 2024-08-14
Payer: COMMERCIAL

## 2024-08-14 VITALS
RESPIRATION RATE: 18 BRPM | SYSTOLIC BLOOD PRESSURE: 116 MMHG | DIASTOLIC BLOOD PRESSURE: 60 MMHG | HEART RATE: 76 BPM | TEMPERATURE: 97.3 F | OXYGEN SATURATION: 98 %

## 2024-08-14 DIAGNOSIS — D15.1 MYXOMA OF HEART (HHS-HCC): ICD-10-CM

## 2024-08-14 LAB
ALBUMIN SERPL BCP-MCNC: 4.2 G/DL (ref 3.4–5)
ANION GAP SERPL CALC-SCNC: 14 MMOL/L (ref 10–20)
BUN SERPL-MCNC: 12 MG/DL (ref 6–23)
CALCIUM SERPL-MCNC: 10 MG/DL (ref 8.6–10.3)
CHLORIDE SERPL-SCNC: 102 MMOL/L (ref 98–107)
CO2 SERPL-SCNC: 28 MMOL/L (ref 21–32)
CREAT SERPL-MCNC: 0.68 MG/DL (ref 0.5–1.05)
EGFRCR SERPLBLD CKD-EPI 2021: >90 ML/MIN/1.73M*2
ERYTHROCYTE [DISTWIDTH] IN BLOOD BY AUTOMATED COUNT: 14.1 % (ref 11.5–14.5)
GLUCOSE SERPL-MCNC: 90 MG/DL (ref 74–99)
HCT VFR BLD AUTO: 38.7 % (ref 36–46)
HGB BLD-MCNC: 12.6 G/DL (ref 12–16)
MAGNESIUM SERPL-MCNC: 2.26 MG/DL (ref 1.6–2.4)
MCH RBC QN AUTO: 31.3 PG (ref 26–34)
MCHC RBC AUTO-ENTMCNC: 32.6 G/DL (ref 32–36)
MCV RBC AUTO: 96 FL (ref 80–100)
NRBC BLD-RTO: 0 /100 WBCS (ref 0–0)
PHOSPHATE SERPL-MCNC: 4.6 MG/DL (ref 2.5–4.9)
PLATELET # BLD AUTO: 523 X10*3/UL (ref 150–450)
POTASSIUM SERPL-SCNC: 4.5 MMOL/L (ref 3.5–5.3)
RBC # BLD AUTO: 4.02 X10*6/UL (ref 4–5.2)
SODIUM SERPL-SCNC: 139 MMOL/L (ref 136–145)
WBC # BLD AUTO: 6.6 X10*3/UL (ref 4.4–11.3)

## 2024-08-14 PROCEDURE — G0300 HHS/HOSPICE OF LPN EA 15 MIN: HCPCS

## 2024-08-14 PROCEDURE — 36415 COLL VENOUS BLD VENIPUNCTURE: CPT

## 2024-08-14 ASSESSMENT — PAIN DESCRIPTION - PAIN TYPE: TYPE: SURGICAL PAIN

## 2024-08-14 NOTE — TELEPHONE ENCOUNTER
Transition of Care    Inpatient facility: [facility]   Discharge diagnosis: Myxoma of Heart  Discharged to: home   Discharge date: 08/08/2024  Initial Call date: 08/12/2024  Spoke with patient/caregiver: patient                                                                      Do you need assistance  visits prior to your PCP visit: No  Home health care ordered: Yes  Have you been contacted by home care and have a start of care date: Yes  Are you taking medications as prescribed at discharge: Yes    Referral to [facility] Pharmacist: No  Patient advised to bring all medications to PCP follow-up appointment.  Patient advised to follow discharge instructions until provider follow-up.  TCM visit date: 08/16/2024    TCM provider visit with: Albert Berry MD

## 2024-08-15 ENCOUNTER — APPOINTMENT (OUTPATIENT)
Dept: HOME HEALTH SERVICES | Facility: HOME HEALTH | Age: 64
End: 2024-08-15
Payer: COMMERCIAL

## 2024-08-15 SDOH — ECONOMIC STABILITY: GENERAL

## 2024-08-15 ASSESSMENT — PAIN SCALES - PAIN ASSESSMENT IN ADVANCED DEMENTIA (PAINAD)
NEGVOCALIZATION: 0
FACIALEXPRESSION: 0 - SMILING OR INEXPRESSIVE.
TOTALSCORE: 0
NEGVOCALIZATION: 0 - NONE.
BREATHING: 0
CONSOLABILITY: 0 - NO NEED TO CONSOLE.
BODYLANGUAGE: 0 - RELAXED.
BODYLANGUAGE: 0
CONSOLABILITY: 0
FACIALEXPRESSION: 0

## 2024-08-15 ASSESSMENT — ENCOUNTER SYMPTOMS
PERSON REPORTING PAIN: PATIENT
APPETITE LEVEL: GOOD
LAST BOWEL MOVEMENT: 67066
CHANGE IN APPETITE: UNCHANGED
DENIES PAIN: 1
BOWEL PATTERN NORMAL: 1
STOOL FREQUENCY: DAILY

## 2024-08-15 ASSESSMENT — ACTIVITIES OF DAILY LIVING (ADL): MONEY MANAGEMENT (EXPENSES/BILLS): INDEPENDENT

## 2024-08-16 ENCOUNTER — TELEPHONE (OUTPATIENT)
Dept: PRIMARY CARE | Facility: CLINIC | Age: 64
End: 2024-08-16

## 2024-08-16 ENCOUNTER — OFFICE VISIT (OUTPATIENT)
Dept: PRIMARY CARE | Facility: CLINIC | Age: 64
End: 2024-08-16
Payer: COMMERCIAL

## 2024-08-16 VITALS
OXYGEN SATURATION: 96 % | DIASTOLIC BLOOD PRESSURE: 80 MMHG | TEMPERATURE: 97.6 F | SYSTOLIC BLOOD PRESSURE: 118 MMHG | WEIGHT: 148 LBS | RESPIRATION RATE: 16 BRPM | HEART RATE: 83 BPM | BODY MASS INDEX: 22.5 KG/M2

## 2024-08-16 DIAGNOSIS — F32.1 CURRENT MODERATE EPISODE OF MAJOR DEPRESSIVE DISORDER WITHOUT PRIOR EPISODE (MULTI): Primary | ICD-10-CM

## 2024-08-16 DIAGNOSIS — D15.1 MYXOMA OF HEART (HHS-HCC): ICD-10-CM

## 2024-08-16 DIAGNOSIS — F51.01 PRIMARY INSOMNIA: ICD-10-CM

## 2024-08-16 DIAGNOSIS — C18.4 MALIGNANT NEOPLASM OF TRANSVERSE COLON (MULTI): ICD-10-CM

## 2024-08-16 RX ORDER — OXYCODONE HYDROCHLORIDE 5 MG/1
5 TABLET ORAL EVERY 6 HOURS PRN
Qty: 28 TABLET | Refills: 0 | Status: SHIPPED | OUTPATIENT
Start: 2024-08-16 | End: 2024-08-23

## 2024-08-16 NOTE — PROGRESS NOTES
Subjective   Patient ID: Maribel Lakhani is a 64 y.o. female who presents for Hospital Follow-up (Heart surgery).    TCM communication performed 8/14/24.  DC Summary dated 8/8/24 reviewed.   Meds reconciled.     8/2/24 S/p excision of heart myxoma. ([facility])    Trazodone and effexor for anxiety/depression.   Gabapentin 100 tid x 5 days for pain.     Feeling decreased pressure/pain in left face, improved swelling in left leg following heart surgery.   No SOB.  No CP other than at incision site.     Getting visiting nurse visits -- labs back to normal 8/14/24.     Quit smoking.  Has to get clearance from Dr Morales to go for colon surgery.       Objective   Visit Vitals  /80 (BP Location: Left arm, Patient Position: Sitting, BP Cuff Size: Adult)   Pulse 83   Temp 36.4 °C (97.6 °F) (Temporal)   Resp 16   Wt 67.1 kg (148 lb)   LMP  (LMP Unknown)   SpO2 96%   BMI 22.50 kg/m²   OB Status Postmenopausal   Smoking Status Former   BSA 1.79 m²      O: VSS AFEB Awake, Alert, NAD.  No intoxication, withdrawal, or sedation.  Chest CTA.  Heart RRR.  Ext no c/c/e.   Right lateral chest wall with intact surgical incision.     Lab Results   Component Value Date    WBC 6.6 08/14/2024    HGB 12.6 08/14/2024    HCT 38.7 08/14/2024    MCV 96 08/14/2024     (H) 08/14/2024       Chemistry    Lab Results   Component Value Date/Time     08/14/2024 1100    K 4.5 08/14/2024 1100     08/14/2024 1100    CO2 28 08/14/2024 1100    BUN 12 08/14/2024 1100    CREATININE 0.68 08/14/2024 1100    Lab Results   Component Value Date/Time    CALCIUM 10.0 08/14/2024 1100    ALKPHOS 63 07/31/2024 1459    AST 20 07/31/2024 1459    ALT 15 07/31/2024 1459    BILITOT 0.4 07/31/2024 1459          Lab Results   Component Value Date    CHOL 191 08/28/2023    CHOL 214 (H) 02/16/2023    CHOL 205 (H) 08/24/2022     Lab Results   Component Value Date    HDL 64.3 08/28/2023    HDL 63.0 02/16/2023    HDL 63.6 08/24/2022     No results found for:  "\"LDLCALC\"  Lab Results   Component Value Date    TRIG 71 08/28/2023    TRIG 107 02/16/2023    TRIG 73 08/24/2022     No components found for: \"CHOLHDL\"   Lab Results   Component Value Date    HGBA1C 4.9 07/31/2024       Assessment/Plan   Problem List Items Addressed This Visit       Insomnia    Overview     Failed melatonin and ambien 2018.               Current Assessment & Plan     Sleeping well on trazodone.          Current moderate episode of major depressive disorder without prior episode (Multi) - Primary    Current Assessment & Plan     On effexor and trazodone.          Malignant neoplasm of transverse colon (Multi)    Overview     6/13/24 COLONOSCOPY (Mehdi) Invasive adenoCA in transverse colon.   CEA low 3.4    COLORECTAL (Te[facility]or)         Current Assessment & Plan     To schedule colon resection when cleared by cardiology.          Other Visit Diagnoses       Myxoma of heart ([facility])                 "

## 2024-08-19 ENCOUNTER — OFFICE VISIT (OUTPATIENT)
Dept: CARDIOLOGY | Facility: CLINIC | Age: 64
End: 2024-08-19
Payer: COMMERCIAL

## 2024-08-19 ENCOUNTER — TELEPHONE (OUTPATIENT)
Dept: SURGERY | Facility: CLINIC | Age: 64
End: 2024-08-19

## 2024-08-19 VITALS
HEIGHT: 68 IN | WEIGHT: 145 LBS | DIASTOLIC BLOOD PRESSURE: 83 MMHG | RESPIRATION RATE: 16 BRPM | HEART RATE: 77 BPM | SYSTOLIC BLOOD PRESSURE: 121 MMHG | BODY MASS INDEX: 21.98 KG/M2 | OXYGEN SATURATION: 98 %

## 2024-08-19 DIAGNOSIS — Z01.810 PREOP CARDIOVASCULAR EXAM: Primary | ICD-10-CM

## 2024-08-19 DIAGNOSIS — D15.1 MYXOMA OF HEART (HHS-HCC): ICD-10-CM

## 2024-08-19 DIAGNOSIS — C18.4 MALIGNANT NEOPLASM OF TRANSVERSE COLON (MULTI): ICD-10-CM

## 2024-08-19 PROCEDURE — 3008F BODY MASS INDEX DOCD: CPT | Performed by: STUDENT IN AN ORGANIZED HEALTH CARE EDUCATION/TRAINING PROGRAM

## 2024-08-19 PROCEDURE — 99214 OFFICE O/P EST MOD 30 MIN: CPT | Performed by: STUDENT IN AN ORGANIZED HEALTH CARE EDUCATION/TRAINING PROGRAM

## 2024-08-19 ASSESSMENT — PATIENT HEALTH QUESTIONNAIRE - PHQ9
SUM OF ALL RESPONSES TO PHQ9 QUESTIONS 1 AND 2: 0
2. FEELING DOWN, DEPRESSED OR HOPELESS: NOT AT ALL
1. LITTLE INTEREST OR PLEASURE IN DOING THINGS: NOT AT ALL

## 2024-08-19 NOTE — PROGRESS NOTES
Primary Care Physician: Albert Berry MD   Date of Visit: 2024  9:00 AM EDT  Type of Visit: new      Chief Complaint:  Chief Complaint   Patient presents with    Follow-up     From heart surgery        HPI  Maribel Lakhani 64 y.o. female with PMHx of anxiety, depression, lumbar spondylosis, insomnia, high cholesterol, myxoma of the heart, and malignant neoplasm of the transverse colon found to have LT atrial myxoma, no CA on cath, underwent Mini Thoracotomy and Excision of left Atrial Myxoma on 24 here to establish care post op    She has intermittent chest wall pain, takes prn pain killers    She is breathing well.   Not using spirometry    No bleeding issues  No le edema   No palpitations or syncope  No bleeding issues                 Review of Systems   Review of Systems   12 points review of systems are negative expect for the above    Social History:  Social History     Socioeconomic History    Marital status:      Spouse name: Not on file    Number of children: Not on file    Years of education: Not on file    Highest education level: Not on file   Occupational History    Not on file   Tobacco Use    Smoking status: Former     Current packs/day: 0.00     Average packs/day: 0.5 packs/day for 15.0 years (7.5 ttl pk-yrs)     Types: Cigarettes     Quit date: 7/15/2024     Years since quittin.0     Passive exposure: Past    Smokeless tobacco: Never   Vaping Use    Vaping status: Never Used   Substance and Sexual Activity    Alcohol use: Yes     Alcohol/week: 4.0 standard drinks of alcohol     Types: 4 Glasses of wine per week    Drug use: Never    Sexual activity: Not Currently   Other Topics Concern    Not on file   Social History Narrative    Not on file     Social Determinants of Health     Financial Resource Strain: Not on file   Food Insecurity: Not on file   Transportation Needs: No Transportation Needs (8/10/2024)    OASIS : Transportation     Lack of Transportation (Medical):  No     Lack of Transportation (Non-Medical): No     Patient Unable or Declines to Respond: No   Physical Activity: Not on file   Stress: Not on file   Social Connections: Feeling Socially Integrated (8/10/2024)    OASIS : Social Isolation     Frequency of experiencing loneliness or isolation: Never   Intimate Partner Violence: Not on file   Housing Stability: Not on file        Past Medical History:  Past Medical History:   Diagnosis Date    Abnormal finding on mammography 2023    Adhesive capsulitis of left shoulder 2019    Adhesive capsulitis of left shoulder    Adhesive capsulitis of right shoulder 2015    Adhesive capsulitis of right shoulder    Anxiety and depression 2023    # Hx Decreased moods c/w major depression and increased anxiety -- settled down.  stable off all SSRIs.    Bitten by cat, initial encounter 2015    Cat bite    Colon cancer (Multi)     Dry skin dermatitis 2023    Fatigue 2023    Hemangioma of skin and subcutaneous tissue 2022    Hemorrhoidal skin tag 2023    Hyperlipidemia     Melanocytic nevi of trunk 2022    Melanocytic nevi of unspecified lower limb, including hip 2022    Melanocytic nevi of unspecified part of face 2022    Melanocytic nevi of unspecified upper limb, including shoulder 2022    Neoplasm of uncertain behavior of skin 2022    Other conditions influencing health status     Menstruation    Other melanin hyperpigmentation 2022    Other seborrheic keratosis 2022    Other shoulder lesions, unspecified shoulder 2015    Rotator cuff tendonitis    Other viral warts 2022    Pain in left wrist 2021    Left wrist pain    Pain in right shoulder 2016    Right shoulder pain    Personal history of other complications of pregnancy, childbirth and the puerperium     History of spontaneous     Personal history of other diseases of the circulatory system  05/05/2014    History of varicose veins    Personal history of other diseases of the female genital tract     Vaginal delivery    Personal history of other infectious and parasitic diseases     History of varicella    Personal history of other specified conditions 05/05/2014    History of urinary urgency    Petechiae 08/29/2023    Primary focal hyperhidrosis, 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 Passive rom exercises demonstrated. No longer using tramadol.    Unspecified disorder of nose and nasal sinuses 05/05/2014    Sinus problem    Unspecified visual loss 05/05/2014    Vision problems    Xerosis cutis 08/31/2022       Past Surgical History:  Past Surgical History:   Procedure Laterality Date    CARDIAC CATHETERIZATION N/A 7/26/2024    Procedure: Left Heart Cath;  Surgeon: Dariel Dumont MD;  [facility];  Service: Cardiovascular;  Laterality: N/A;    DILATION AND CURETTAGE OF UTERUS  05/05/2014    Dilation And Curettage    FOOT SURGERY  03/11/2014    Foot Surgery    OTHER SURGICAL HISTORY  03/11/2014    Oral Surgery       Family History:  Family History   Problem Relation Name Age of Onset    Lupus Mother      Sjogren's syndrome Mother      Fibromyalgia Mother      Cancer Father JASON Mercado     Hearing loss Father JASON Mercado     Cancer Maternal Grandmother Catia Kiet         Objective:       8/8/2024     5:46 AM 8/8/2024     9:04 AM 8/10/2024    11:00 AM 8/14/2024    10:52 AM 8/16/2024     2:18 PM 8/19/2024     8:46 AM 8/19/2024     8:47 AM   Vitals   Systolic  109 118 116 118  121   Diastolic  68 62 60 80  83   Heart Rate  68 78 76 83  77   Temp  36.6 °C (97.9 °F) 36.6 °C (97.8 °F) 36.3 °C (97.3 °F) 36.4 °C  "(97.6 °F)     Resp  18 20 18 16  16   Height (in)   1.727 m (5' 8\")   1.727 m (5' 8\")    Weight (lb) 156.4  153  148  145   BMI 23.78 kg/m2  23.26 kg/m2  22.5 kg/m2 22.5 kg/m2 22.05 kg/m2   BSA (m2) 1.84 m2  1.82 m2  1.79 m2 1.79 m2 1.78 m2   Visit Report     Report Report Report      Constitutional:       Appearance: Healthy appearance. Not in distress.   Neck:      Vascular: No JVR. JVD normal.   Pulmonary:      Effort: Pulmonary effort is normal.      Breath sounds: Normal breath sounds. No wheezing. No rhonchi. No rales.   Chest:      Chest wall: Not tender to palpatation.   Cardiovascular:      PMI at left midclavicular line. Normal rate. Regular rhythm. Normal S1. Normal S2.  Well healed wounds     Murmurs: There is no murmur.      No gallop.  No click. No rub.   Pulses:     Intact distal pulses.   Edema:     Peripheral edema absent.   Abdominal:      General: Bowel sounds are normal.      Palpations: Abdomen is soft.      Tenderness: There is no abdominal tenderness.   Musculoskeletal: Normal range of motion.         General: No tenderness.   Skin:     General: Skin is warm and dry.   Neurological:      General: No focal deficit present.      Mental Status: Alert and oriented to person, place and time.     Allergies:  Allergies   Allergen Reactions    Adhesive Tape-Silicones Itching    Ampicillin Rash    Cefdinir Rash    [id number] Antimigraine Agents Anxiety       Medications:  Current Outpatient Medications   Medication Instructions    acetaminophen (TYLENOL) 650 mg, oral, Every 6 hours PRN    aspirin 81 mg, oral, Daily    ezetimibe (ZETIA) 10 mg, oral, Daily    metoprolol tartrate (LOPRESSOR) 12.5 mg, oral, 2 times daily    mv-mn/folic ac/calcium/vit K1 (WOMEN'S 50 PLUS MULTIVITAMIN ORAL) 1 tablet, oral, Daily    oxyCODONE (ROXICODONE) 5 mg, oral, Every 6 hours PRN    traZODone (DESYREL) 100 mg, oral, Daily    venlafaxine XR (EFFEXOR-XR) 37.5 mg, oral, Daily, Do not crush or chew.        Labs and " Imaging:     Lab Results   Component Value Date    WBC 6.6 08/14/2024    HGB 12.6 08/14/2024    HCT 38.7 08/14/2024     (H) 08/14/2024    CHOL 191 08/28/2023    TRIG 71 08/28/2023    HDL 64.3 08/28/2023    ALT 15 07/31/2024    AST 20 07/31/2024     08/14/2024    K 4.5 08/14/2024     08/14/2024    CREATININE 0.68 08/14/2024    BUN 12 08/14/2024    CO2 28 08/14/2024    TSH 1.15 08/12/2020    INR 1.2 (H) 08/02/2024    HGBA1C 4.9 07/31/2024         Echocardiogram: No results found for this or any previous visit from the past 1825 days.    Stress Testing: No results found for this or any previous visit from the past 1825 days.    Cardiac Catheterization: No results found for this or any previous visit from the past 1825 days.    Cardiac Scoring: No results found for this or any previous visit from the past 1825 days.    AAA : No results found for this or any previous visit from the past 1825 days.    OTHER: No results found for this or any previous visit from the past 1825 days.      The 10-year ASCVD risk score (Noemi ORR, et al., 2019) is: 5.4%    Values used to calculate the score:      Age: 64 years      Sex: Female      Is Non- : No      Diabetic: No      Tobacco smoker: No      Systolic Blood Pressure: 121 mmHg      Is BP treated: Yes      HDL Cholesterol: 64.3 mg/dL      Total Cholesterol: 191 mg/dL     Assessment/Plan:   1. Myxoma of heart ([facility])  Referral to Cardiology         Maribel Lakhani 64 y.o. female with PMHx of anxiety, depression, lumbar spondylosis, insomnia, high cholesterol, myxoma of the heart, and malignant neoplasm of the transverse colon found to have LT atrial myxoma, no CA on cath, underwent Mini Thoracotomy and Excision of left Atrial Myxoma on 8/2/24 here to establish care post op  Recovering well     She can undergo colon surgery if needed to be done, she is intermediate risk. I advised to wait 30 days from the day of surgery     Plan  Refer to  cardiac rehab, pending cardiac surgery clearance   Continue metoprolol, was started for short runs of SVT per notes, no symptoms recurrence   Continue ASA and zetia   RTC 6 months with echo    Time Spent: I spent  minutes reviewing medical testing, obtaining medical history and counselling and educating on diagnosis and documenting clinical encounter.         ____________________________________________________________  Dianne Coppola MD   of Medicine  Division of Cardiovascular Medicine   [facility] [place]  [facility]

## 2024-08-19 NOTE — ADDENDUM NOTE
Addendum  created 08/19/24 1109 by Maria Isabel Chavez    Attestation recorded in Intraprocedure (Perfusion), Intraprocedure Attestations filed (Perfusion)

## 2024-08-19 NOTE — TELEPHONE ENCOUNTER
Attempted to reach patient to schedule LX extended right colectomy with Dr. Cotter.    Message left that Dr. Cotter asked me to contact you to schedule a procedure.  I invited her to please contact me at [phone].  Will need PAT and cardiac clearance and cardiology to advise on anticoagulation cessation.  Elissa Meade RN

## 2024-08-20 ENCOUNTER — HOME CARE VISIT (OUTPATIENT)
Dept: HOME HEALTH SERVICES | Facility: HOME HEALTH | Age: 64
End: 2024-08-20
Payer: COMMERCIAL

## 2024-08-20 VITALS
TEMPERATURE: 97.3 F | RESPIRATION RATE: 18 BRPM | OXYGEN SATURATION: 95 % | HEART RATE: 73 BPM | DIASTOLIC BLOOD PRESSURE: 74 MMHG | SYSTOLIC BLOOD PRESSURE: 114 MMHG

## 2024-08-20 PROCEDURE — G0300 HHS/HOSPICE OF LPN EA 15 MIN: HCPCS

## 2024-08-20 RX ORDER — GABAPENTIN 100 MG/1
CAPSULE ORAL
Qty: 3 CAPSULE | Refills: 0 | Status: SHIPPED | OUTPATIENT
Start: 2024-08-20

## 2024-08-20 RX ORDER — METRONIDAZOLE 250 MG/1
TABLET ORAL
Qty: 3 TABLET | Refills: 0 | Status: SHIPPED | OUTPATIENT
Start: 2024-08-20

## 2024-08-20 RX ORDER — NEOMYCIN SULFATE 500 MG/1
TABLET ORAL
Qty: 6 TABLET | Refills: 0 | Status: SHIPPED | OUTPATIENT
Start: 2024-08-20

## 2024-08-20 SDOH — ECONOMIC STABILITY: GENERAL

## 2024-08-20 ASSESSMENT — PAIN SCALES - PAIN ASSESSMENT IN ADVANCED DEMENTIA (PAINAD)
CONSOLABILITY: 0
NEGVOCALIZATION: 0 - NONE.
CONSOLABILITY: 0 - NO NEED TO CONSOLE.
FACIALEXPRESSION: 0 - SMILING OR INEXPRESSIVE.
BREATHING: 0
FACIALEXPRESSION: 0
TOTALSCORE: 0
BODYLANGUAGE: 0
BODYLANGUAGE: 0 - RELAXED.
NEGVOCALIZATION: 0

## 2024-08-20 ASSESSMENT — PAIN DESCRIPTION - PAIN TYPE: TYPE: SURGICAL PAIN

## 2024-08-20 ASSESSMENT — ENCOUNTER SYMPTOMS
STOOL FREQUENCY: DAILY
BOWEL PATTERN NORMAL: 1
PAIN: 1
LAST BOWEL MOVEMENT: 67072
PERSON REPORTING PAIN: PATIENT

## 2024-08-20 ASSESSMENT — ACTIVITIES OF DAILY LIVING (ADL): MONEY MANAGEMENT (EXPENSES/BILLS): INDEPENDENT

## 2024-08-21 ENCOUNTER — CLINICAL SUPPORT (OUTPATIENT)
Dept: PREADMISSION TESTING | Facility: HOSPITAL | Age: 64
End: 2024-08-21
Payer: COMMERCIAL

## 2024-08-21 ENCOUNTER — TELEPHONE (OUTPATIENT)
Dept: PREADMISSION TESTING | Facility: HOSPITAL | Age: 64
End: 2024-08-21
Payer: COMMERCIAL

## 2024-08-21 DIAGNOSIS — C18.4 MALIGNANT NEOPLASM OF TRANSVERSE COLON (MULTI): ICD-10-CM

## 2024-08-23 ENCOUNTER — LAB (OUTPATIENT)
Dept: LAB | Facility: LAB | Age: 64
End: 2024-08-23
Payer: COMMERCIAL

## 2024-08-23 ENCOUNTER — DOCUMENTATION (OUTPATIENT)
Dept: PREADMISSION TESTING | Facility: HOSPITAL | Age: 64
End: 2024-08-23

## 2024-08-23 ENCOUNTER — PRE-ADMISSION TESTING (OUTPATIENT)
Dept: PREADMISSION TESTING | Facility: HOSPITAL | Age: 64
End: 2024-08-23
Payer: COMMERCIAL

## 2024-08-23 VITALS
SYSTOLIC BLOOD PRESSURE: 116 MMHG | TEMPERATURE: 96.8 F | RESPIRATION RATE: 18 BRPM | OXYGEN SATURATION: 98 % | WEIGHT: 144.4 LBS | HEART RATE: 83 BPM | BODY MASS INDEX: 21.89 KG/M2 | DIASTOLIC BLOOD PRESSURE: 87 MMHG | HEIGHT: 68 IN

## 2024-08-23 DIAGNOSIS — Z01.818 PREOP EXAMINATION: ICD-10-CM

## 2024-08-23 DIAGNOSIS — Z01.818 PREOP EXAMINATION: Primary | ICD-10-CM

## 2024-08-23 LAB
ABO GROUP (TYPE) IN BLOOD: NORMAL
ANTIBODY SCREEN: NORMAL
RH FACTOR (ANTIGEN D): NORMAL

## 2024-08-23 PROCEDURE — 36415 COLL VENOUS BLD VENIPUNCTURE: CPT

## 2024-08-23 PROCEDURE — 86900 BLOOD TYPING SEROLOGIC ABO: CPT

## 2024-08-23 PROCEDURE — 86850 RBC ANTIBODY SCREEN: CPT

## 2024-08-23 PROCEDURE — 87081 CULTURE SCREEN ONLY: CPT | Mod: AHULAB | Performed by: NURSE PRACTITIONER

## 2024-08-23 PROCEDURE — 86901 BLOOD TYPING SEROLOGIC RH(D): CPT

## 2024-08-23 PROCEDURE — 99204 OFFICE O/P NEW MOD 45 MIN: CPT | Performed by: NURSE PRACTITIONER

## 2024-08-23 RX ORDER — CHLORHEXIDINE GLUCONATE ORAL RINSE 1.2 MG/ML
15 SOLUTION DENTAL 2 TIMES DAILY
Qty: 473 ML | Refills: 0 | Status: SHIPPED | OUTPATIENT
Start: 2024-08-23

## 2024-08-23 ASSESSMENT — ENCOUNTER SYMPTOMS
GASTROINTESTINAL NEGATIVE: 1
MUSCULOSKELETAL NEGATIVE: 1
NEUROLOGICAL NEGATIVE: 1
NECK NEGATIVE: 1
ENDOCRINE NEGATIVE: 1
CONSTITUTIONAL NEGATIVE: 1
RESPIRATORY NEGATIVE: 1

## 2024-08-23 NOTE — PREPROCEDURE INSTRUCTIONS
Medication List            Accurate as of August 23, 2024  9:23 AM. Always use your most recent med list.                acetaminophen 325 mg tablet  Commonly known as: Tylenol  Take 2 tablets (650 mg) by mouth every 6 hours if needed for mild pain (1 - 3).  Medication Adjustments for Surgery: Take morning of surgery with sip of water, no other fluids     aspirin 81 mg EC tablet  Take 1 tablet (81 mg) by mouth once daily.  Medication Adjustments for Surgery: Take morning of surgery with sip of water, no other fluids     chlorhexidine 0.12 % solution  Commonly known as: Peridex  Use 15 mL in the mouth or throat 2 times a day. Use night before surgery and morning of surgery Swish and spit     ezetimibe 10 mg tablet  Commonly known as: Zetia  Take 1 tablet (10 mg) by mouth once daily.  Medication Adjustments for Surgery: Stop 1 day before surgery     gabapentin 100 mg capsule  Commonly known as: Neurontin  Take one capsule by mouth starting three days before surgery at bedtime.  Medication Adjustments for Surgery: Take morning of surgery with sip of water, no other fluids     metoprolol tartrate 25 mg tablet  Commonly known as: Lopressor  Take 0.5 tablets (12.5 mg) by mouth 2 times a day.  Medication Adjustments for Surgery: Take morning of surgery with sip of water, no other fluids     metroNIDAZOLE 250 mg tablet  Commonly known as: Flagyl  Take one tablet at 6p, 7p, and 11p the night before surgery.  Medication Adjustments for Surgery: Take morning of surgery with sip of water, no other fluids     neomycin 500 mg tablet  Commonly known as: Mycifradin  Take two tabs by mouth at 6p, 7pm, and 11p the night before surgery.  Medication Adjustments for Surgery: Take morning of surgery with sip of water, no other fluids     oxyCODONE 5 mg immediate release tablet  Commonly known as: Roxicodone  Take 1 tablet (5 mg) by mouth every 6 hours if needed for severe pain (7 - 10) for up to 7 days.  Medication Adjustments for  Surgery: Take morning of surgery with sip of water, no other fluids     traZODone 100 mg tablet  Commonly known as: Desyrel  Take 1 tablet (100 mg) by mouth once daily.  Medication Adjustments for Surgery: Take morning of surgery with sip of water, no other fluids     venlafaxine XR 37.5 mg 24 hr capsule  Commonly known as: Effexor-XR  Take 1 capsule (37.5 mg) by mouth once daily. Do not crush or chew.  Medication Adjustments for Surgery: Take morning of surgery with sip of water, no other fluids     WOMEN'S 50 PLUS MULTIVITAMIN ORAL  Medication Adjustments for Surgery: Stop 7 days before surgery                      **Concerning above medication instructions, if medication is normally taken at night, continue normal schedule.**  **DO NOT TAKE NIGHT PRIOR AND MORNING OF SURGERY**    CONTACT SURGEON'S OFFICE IF YOU DEVELOP:  * Fever = 100.4 F   * New respiratory symptoms (e.g. cough, shortness of breath, respiratory distress, sore throat)  * Recent loss of taste or smell  *Flu like symptoms such as headache, fatigue or gastrointestinal symptoms  * You develop any open sores, shingles, burning or painful urination   AND/OR:  * You no longer wish to have the surgery.  * Any other personal circumstances change that may lead to the need to cancel or defer this surgery.  *You were admitted to any hospital within one week of your planned procedure.    SMOKING:  *Quitting smoking can make a huge difference to your health and recovery from surgery.    *If you need help with quitting, call 5-473-QUIT-NOW.    THE DAY BEFORE SURGERY:  *Do not eat any food after midnight the night before surgery.   *You are permitted to drink clear liquids (i.e. water, black coffee (no milk or cream), tea, apple juice and electrolyte drinks (gatorade)) up to 13.5 ounces, up to 2 hours before your arrival time.  *You may chew gum until 2 hours before your surgery    SURGICAL TIME  *You will be contacted between 2 p.m. and 6 p.m. the business day  before your surgery with your arrival time.  *If you haven't received a call by 6pm, call [phone].  *Scheduled surgery times may change and you will be notified if this occurs-check your personal voicemail for any updates.    ON THE MORNING OF SURGERY:  *Wear comfortable, loose fitting clothing.   *Do not use moisturizers, creams, lotions or perfume.  *All jewelry and valuables should be left at home.  *Prosthetic devices such as contact lenses, hearing aids, dentures, eyelash extensions, hairpins and body piercing must be removed before surgery.    BRING WITH YOU:  *Photo ID and insurance card  *Current list of medicines and allergies  *Pacemaker/Defibrillator/Heart stent cards  *CPAP machine and mask  *Slings/splints/crutches  *Copy of your complete Advanced Directive/DHPOA-if applicable  *Neurostimulator implant remote    PARKING AND ARRIVAL:  *Check in at the Main Entrance desk and let them know you are here for surgery.  *You will be directed to the 2nd floor surgical waiting area.    AFTER OUTPATIENT SURGERY:  *A responsible adult MUST accompany you at the time of discharge and stay with you for 24 hours after your surgery.  *You may NOT drive yourself home after surgery.  *You may use a taxi or ride sharing service ([employer], Uber) to return home ONLY if you are accompanied by a friend or family member.  *Instructions for resuming your medications will be provided by your surgeon.                 Patient Information: Oral/Dental Rinse  **This is a prescription; pick it up at your preferred local pharmacy **  What is oral/dental rinse?   It is a mouthwash. It is a way of cleaning the mouth with a germ killing solution before your surgery.  The solution contains chlorhexidine, commonly known as CHG.   It is used inside the mouth to kill a bacteria known as Staphylococcus aureus.  Let your doctor know if you are allergic to Chlorhexidine.    Why do I need to use CHG oral/dental rinse?  The CHG oral/dental rinse  helps to kill a bacteria in your mouth known a Staphylococcus aureus.     This reduces the risk of infection at the surgical site.      Using your CHG oral/dental rinse  STEPS:  Use your CHG oral/dental rinse after you brush your teeth the night before (at bedtime) and the morning of your surgery.  Follow all directions on your prescription label.    Use the cap on the container to measure 15ml (fill cap to fill line)  Swish (gargle if you can) the mouthwash in your mouth for at least 30 seconds, (do not to swallow) spit out  After you use your CHG rinse, do not rinse your mouth with water, drink or eat.  Please refer to prescription label for the appropriate time to resume oral intake  Dental rinse comes in one size bottle: 473ml ~16oz.  You will have leftover    rinse, discard after this use.    What side effects might I have using the CHG oral/dental rinse?  CHG rinse will stick to plaque on the teeth.  Brush and floss just before use.  Teeth brushing will help avoid staining of plaque during use.    Who should I contact if I have questions about the CHG oral/dental rinse?  Please call [facility], Preadmission Testing at [phone] if you have any questions             Home Preoperative Antibacterial Shower     What is a home preoperative antibacterial shower?  This shower is a way of cleaning the skin with a germ killing soap before surgery.  The soap contains chlorhexidine, commonly known as CHG.  CHG is a soap for your skin with germ killing ability.  Let your doctor know if you are allergic to chlorhexidine.    Why do I need to take a preoperative antibacterial shower?  Skin is not sterile.  It is best to try to make your skin as free of germs as possible before surgery.  Proper cleansing with a germ killing soap before surgery can lower the number of germs on your skin.  This helps to reduce the risk of infection at the surgical site.  Following the instructions listed below  will help you prepare your skin for surgery.      How do I use the CHG skin cleanser?  Steps:  Begin using your CHG soap five days before your scheduled surgery on ________________________.    Days 1-4 Shower before bed:  Wash your face and genitals with your normal soap and rinse.    Wash and rinse your hair using the CHG soap. Rinse completely, do not condition your   Hair.          3.    Apply the CHG soap to a clean wet washcloth.  Turn the water off or move away                From the water spray to avoid premature rinsing of the CHG soap as you are applying.     4.   Lather your entire body from the neck down.  Do not use on your face or genitals.   Pay special attention to the area(s) where your incision(s) will be located unless they are on your face.  Avoid scrubbing your skin too hard.  The important point is to have the CHG soap sit on your skin for 3 minutes.    When the 3 minutes are up, turn on the water and rinse the CHG soap off your body completely.   Pat yourself dry with a clean, freshly-laundered towel.  Dress in clean, freshly laundered night clothes.    Be sure to sleep with clean, freshly laundered sheets.  Day 5:  Last shower is the morning before surgery: Follow above Instructions.    NOTE:    *Hair extensions should be removed    *Keep CHG soap out of eyes and ear canals   *DO NOT wash with regular soap on your body after you have used the CHG        soap solution  *DO NOT apply powders, lotions, or perfume.  *Deodorant may be used days 1-4, BUT NOT the day of surgery    Who should I contact if I have any questions regarding the use of CHG soap?  Call the [facility], Preadmission Testing at [phone] if you have any questions.              Patient Information: Pre-Operative Infection Prevention Measures     Why did I have my nose, under my arms and groin swabbed?  The purpose of the swab is to identify Staphylococcus aureus inside your nose or on your  skin.  The swab was sent to the laboratory for culture.  A positive swab/culture for Staphylococcus aureus is called colonization or carriage.      What is Staphylococcus aureus?  Staphylococcus aureus, also known as “staph”, is a germ found on the skin or in the nose of healthy people.  Sometimes Staphylococcus aureus can get into the body and cause an infection.  This can be minor (such as pimples, boils or other skin problems).  It might also be serious (such as blood infection, pneumonia or a surgical site infection).    What is Staphylococcus aureus colonization or carriage?  Colonization or carriage means that a person has the germ but is not sick from it.  These bacteria can be spread on the hands or when breathing or sneezing.    How is Staphylococcus aureus spread?  It is most often spread by close contact with a person or item that carries it.    What happens if my culture is positive for Staphylococcus aureus?  Your doctor/medical team will use this information to guide any antibiotic treatment which may be necessary.  Regardless of the culture results, we will clean the inside of your nose with a betadine swab just before you have your surgery.      Will I get an infection if I have Staphylococcus aureus in my nose or on my skin?  Anyone can get an infection with Staphylococcus aureus.  However, the best way to reduce your risk of infection is to follow the instructions provided to you for the use of your CHG soap and dental rinse.        Who should I contact if I have any questions?  Call the [facility], Preadmission Testing at [phone] if you have any questions.

## 2024-08-23 NOTE — CPM/PAT NURSE NOTE
CPM/PAT Nurse Note      Name: Maribel Lakhani (Maribel Lakhani)  /Age: 1960/64 y.o.       Past Medical History:   Diagnosis Date    Anxiety and depression     Cardiology follow-up encounter 2024    Dianne Coppola MD    Encounter for pre-operative cardiovascular clearance 2024    Dianne Coppola MD- She can undergo colon surgery if needed to be done, she is intermediate risk. I advised to wait 30 days from the day of surgery    H/O transesophageal echocardiography (FABIOLA) for monitoring 2024    CONCLUSIONS:  1. The left ventricular systolic function is normal, with a visually estimated ejection fraction of 65%.  2. Left ventricular diastolic filling was indeterminate.  3. Left ventricular cavity size is decreased.  4. There is normal right ventricular global systolic function.  5. Aortic valve stenosis is not present.  6. Mild aortic valve regurgitation.    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    Family History   Problem Relation Name Age of Onset    Lupus Mother      Sjogren's syndrome Mother      Fibromyalgia Mother      Cancer Father CASEYPako Joby Mercado     Hearing loss Father CASEYPako Joby Mercado     Cancer  Maternal Grandmother Catia Santa        Allergies   Allergen Reactions    Adhesive Tape-Silicones Itching    Ampicillin Rash    Cefdinir Rash    [id number] Antimigraine Agents Anxiety       Prior to Admission medications    Medication Sig Start Date End Date Taking? Authorizing Provider   acetaminophen (Tylenol) 325 mg tablet Take 2 tablets (650 mg) by mouth every 6 hours if needed for mild pain (1 - 3). 8/7/24   Ramu Mccloud PA-C   aspirin 81 mg EC tablet Take 1 tablet (81 mg) by mouth once daily. 8/7/24 9/6/24  Ramu Mccloud PA-C   chlorhexidine (Peridex) 0.12 % solution Use 15 mL in the mouth or throat 2 times a day. Use night before surgery and morning of surgery Swish and spit 8/23/24   CARRI Zhou-CNP   ezetimibe (Zetia) 10 mg tablet Take 1 tablet (10 mg) by mouth once daily. 2/20/24   Albert Berry MD   gabapentin (Neurontin) 100 mg capsule Take one capsule by mouth starting three days before surgery at bedtime.  Patient not taking: Reported on 8/23/2024 8/20/24   Yann Cotter MD   metoprolol tartrate (Lopressor) 25 mg tablet Take 0.5 tablets (12.5 mg) by mouth 2 times a day. 8/7/24 9/6/24  Ramu Mccloud PA-C   metroNIDAZOLE (Flagyl) 250 mg tablet Take one tablet at 6p, 7p, and 11p the night before surgery.  Patient not taking: Reported on 8/23/2024 8/20/24   Yann Cotter MD   mv-mn/folic ac/calcium/vit K1 (WOMEN'S 50 PLUS MULTIVITAMIN ORAL) Take 1 tablet by mouth once daily.    Historical Provider, MD   neomycin (Mycifradin) 500 mg tablet Take two tabs by mouth at 6p, 7pm, and 11p the night before surgery.  Patient not taking: Reported on 8/23/2024 8/20/24   Yann Cotter MD   oxyCODONE (Roxicodone) 5 mg immediate release tablet Take 1 tablet (5 mg) by mouth every 6 hours if needed for severe pain (7 - 10) for up to 7 days. 8/16/24 8/23/24  Albert Berry MD   traZODone (Desyrel) 100 mg tablet Take 1 tablet (100 mg) by mouth once daily. 2/20/24  2/19/25  Albert Berry MD   venlafaxine XR (Effexor-XR) 37.5 mg 24 hr capsule Take 1 capsule (37.5 mg) by mouth once daily. Do not crush or chew. 5/1/24 5/1/25  Albert Berry MD   methocarbamol (Robaxin) 750 mg tablet Take 1 tablet (750 mg) by mouth every 8 hours for 2 days. 8/7/24 8/19/24  RYLIE Ricks     DASI Risk Score    No data to display       Caprini DVT Assessment    No data to display       Modified Frailty Index    No data to display       CHADS2 Stroke Risk  Current as of 2 hours ago        N/A 3 to 100%: High Risk   2 to < 3%: Medium Risk   0 to < 2%: Low Risk     Last Change: N/A          This score determines the patient's risk of having a stroke if the patient has atrial fibrillation.        This score is not applicable to this patient. Components are not calculated.          Revised Cardiac Risk Index    No data to display       Apfel Simplified Score    No data to display       Risk Analysis Index Results This Encounter    No data found in the last 1 encounters.       After Visit Summary (AVS) reviewed and patient verbalized good understanding of medications and NPO instructions.  Pre-op infection prevention measures:  CHG showers and mouthwash reviewed, understanding voiced.  CHG soap given and patient verbalized need to pick CHG mouthwash at their preferred local pharmacy.     Nurse Plan of Action:

## 2024-08-23 NOTE — CPM/PAT H&P
[facility] Evaluation       Name: Maribel Lakhani (Maribel Lakhain)  /Age: 1960/64 y.o.     In-Person           HPI        Date of Consult: 24    Referring Provider: Dr. Cotter    Surgery, Date, and Length: Laparoscopic Extended Right Colectomy; Ileocolic Anastomosis, 24    Maribel Lakhani is a 64 year-old female who presents to the [facility] for perioperative risk assessment prior to surgery.    Patient presents with a primary diagnosis of colon cancer. CT CAP   IMPRESSION: A large filling defect seen in the left atrium which may represent  myxoma versus thrombus. Echocardiogram advised. No features of metastatic disease to the chest. Reported history of colonic mass. Stool obscures evaluation of the colon. Slight hepatic steatosis. Senescent changes detected.    This note was created in part upon personal review of patient's medical records.      Patient is scheduled to have Laparoscopic Extended Right Colectomy; Ileocolic Anastomosis      Pt denies any past history of anesthetic complications such as PONV, awareness, prolonged sedation, dental damage, aspiration, cardiac arrest, difficult intubation, difficult I.V. access or unexpected hospital admissions.  NO malignant hyperthermia and or pseudocholinesterase deficiency.  No history of blood transfusions     STOP BANG 2    The patient is not a Jew and will accept blood and blood products if medically indicated.   Type and screen sent.     Past Medical History:   Diagnosis Date    Anxiety and depression     Cardiology follow-up encounter 2024    Dianne Coppola MD    Encounter for pre-operative cardiovascular clearance 2024    Dianne Coppola MD- She can undergo colon surgery if needed to be done, she is intermediate risk. I advised to wait 30 days from the day of surgery    H/O transesophageal echocardiography (FABIOLA) for monitoring 2024    CONCLUSIONS:  1. The left ventricular systolic function is normal, with a visually  "estimated ejection fraction of 65%.  2. Left ventricular diastolic filling was indeterminate.  3. Left ventricular cavity size is decreased.  4. There is normal right ventricular global systolic function.  5. Aortic valve stenosis is not present.  6. Mild aortic valve regurgitation.    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    CARDIAC CATHETERIZATION N/A 2024    Procedure: Left Heart Cath;  Surgeon: Dariel Dumont MD;  Location: [facility] Cardiac [facility] Lab;  Service: Cardiovascular;  Laterality: N/A;    DILATION AND CURETTAGE OF UTERUS      FOOT SURGERY Left     MOUTH SURGERY  1984    tooth extraction       Social History     Tobacco Use    Smoking status: Former     Current packs/day: 0.00     Average packs/day: 0.5 packs/day for 15.0 years (7.5 ttl pk-yrs)     Types: Cigarettes     Quit date: 7/15/2024     Years since quittin.1     Passive exposure: Past    Smokeless tobacco: Never   Vaping Use    Vaping status: Never Used   Substance Use Topics    Alcohol use: Yes     Alcohol/week: 4.0 standard drinks of alcohol     Types: 4 Glasses of wine per week    Drug use: Never        Family History   Problem Relation Name Age of Onset    Lupus Mother      Sjogren's syndrome Mother      Fibromyalgia Mother      Cancer Father CASEYPako Joby Mercado     Hearing loss Father CASEYPako Joby Mercado     Cancer Maternal Grandmother Catia Santa      Heart Rate:  [83]   Temp:  [36 °C (96.8 °F)]   Resp:  [18]   BP: (116)/(87)   Height:  [172.7 cm (5' 7.99\")]   Weight:  [65.5 kg (144 lb 6.4 oz)] "   SpO2:  [98 %]      Allergies   Allergen Reactions    Adhesive Tape-Silicones Itching    Ampicillin Rash    Cefdinir Rash    Norpiwfj-5-Zn2 Antimigraine Agents Anxiety       Current Outpatient Medications:     acetaminophen (Tylenol) 325 mg tablet, Take 2 tablets (650 mg) by mouth every 6 hours if needed for mild pain (1 - 3)., Disp: , Rfl:     aspirin 81 mg EC tablet, Take 1 tablet (81 mg) by mouth once daily., Disp: 30 tablet, Rfl: 0    ezetimibe (Zetia) 10 mg tablet, Take 1 tablet (10 mg) by mouth once daily., Disp: 90 tablet, Rfl: 3    metoprolol tartrate (Lopressor) 25 mg tablet, Take 0.5 tablets (12.5 mg) by mouth 2 times a day., Disp: 30 tablet, Rfl: 0    mv-mn/folic ac/calcium/vit K1 (WOMEN'S 50 PLUS MULTIVITAMIN ORAL), Take 1 tablet by mouth once daily., Disp: , Rfl:     oxyCODONE (Roxicodone) 5 mg immediate release tablet, Take 1 tablet (5 mg) by mouth every 6 hours if needed for severe pain (7 - 10) for up to 7 days., Disp: 28 tablet, Rfl: 0    traZODone (Desyrel) 100 mg tablet, Take 1 tablet (100 mg) by mouth once daily., Disp: 90 tablet, Rfl: 3    venlafaxine XR (Effexor-XR) 37.5 mg 24 hr capsule, Take 1 capsule (37.5 mg) by mouth once daily. Do not crush or chew., Disp: 30 capsule, Rfl: 11    chlorhexidine (Peridex) 0.12 % solution, Use 15 mL in the mouth or throat 2 times a day. Use night before surgery and morning of surgery Swish and spit, Disp: 473 mL, Rfl: 0    gabapentin (Neurontin) 100 mg capsule, Take one capsule by mouth starting three days before surgery at bedtime. (Patient not taking: Reported on 8/23/2024), Disp: 3 capsule, Rfl: 0    metroNIDAZOLE (Flagyl) 250 mg tablet, Take one tablet at 6p, 7p, and 11p the night before surgery. (Patient not taking: Reported on 8/23/2024), Disp: 3 tablet, Rfl: 0    neomycin (Mycifradin) 500 mg tablet, Take two tabs by mouth at 6p, 7pm, and 11p the night before surgery. (Patient not taking: Reported on 8/23/2024), Disp: 6 tablet, Rfl: 0        PAT ROS:    Constitutional:   neg    Neuro/Psych:   neg    Eyes:    Wears glasses  Ears:   neg    Nose:   neg    Mouth:   neg    Throat:   neg    Neck:   neg    Cardio:    Functional 2 mets. Physical activity limited due to patient recently excision of myxoma 8/2/24 Patient not cleared for cardiac rehab   Respiratory:   neg    Endocrine:   neg    GI:   neg    :   neg    Musculoskeletal:   neg    Hematologic:   neg    Skin:   Right chest wall bandage to cover incision      Physical Exam  Vitals reviewed. Physical exam within normal limits.          PAT AIRWAY:   Airway:     Mallampati::  II    Neck ROM::  Full  normal        Lab Results   Component Value Date    GLUCOSE 90 08/14/2024    CALCIUM 10.0 08/14/2024     08/14/2024    K 4.5 08/14/2024    CO2 28 08/14/2024     08/14/2024    BUN 12 08/14/2024    CREATININE 0.68 08/14/2024      Lab Results   Component Value Date    WBC 6.6 08/14/2024    HGB 12.6 08/14/2024    HCT 38.7 08/14/2024    MCV 96 08/14/2024     (H) 08/14/2024        Encounter Date: 08/02/24   Electrocardiogram 12-lead PRN for arrhythmia   Result Value    Ventricular Rate 57    Atrial Rate 57    TX Interval 172    QRS Duration 84    QT Interval 438    QTC Calculation(Bazett) 426    R Axis 66    T Axis 42    QRS Count 10    Q Onset 224    P Onset 138    P Offset 202    T Offset 443    QTC Fredericia 430    Narrative    Sinus bradycardia  Otherwise normal ECG  When compared with ECG of 02-AUG-2024 14:18,  QRS axis Shifted right  Criteria for Septal infarct are no longer Present  ST elevation now present in Anterior leads  Confirmed by Philippe Kwan ([id number]) on 8/8/2024 2:35:52 PM      PHYSICIAN INTERPRETATION:  FABIOLA Details: The FABIOLA probe used was X*-2t. Technically adequate omniplane transesophageal echocardiogram performed. Color flow Doppler and agitated saline contrast echo was performed to assess for the presence of a patent foramen ovale.  FABIOLA Medication: Anesthesia IOTEE was used to sedate  the patient for this exam.  FABIOLA Procedure: The probe was passed without difficulty. Complications encountered during procedure: Patient tolerated the procedure well without any apparent complications.  Left Ventricle: The left ventricular systolic function is normal, with a visually estimated ejection fraction of 65%. The left ventricular cavity size is decreased. The left ventricular septal wall thickness is normal. Left ventricular diastolic filling was indeterminate. (See post study for additional findings).  Left Atrium: The left atrium was not well visualized. There is no evidence of a patent foramen ovale. There is a normal sized left atrial appendage, the left atrial appendage Doppler velocities are normal, there is no thrombus visualized in the left atrial appendage and there is spontaneous contrast noted in the left atrial appendage. A large left atrial mass consistent with a myxoma, measuring 4.17 cm x 3.25 cm in the largest diameters was noted with the stalk adherent to the interatrial septum. The mass was noted to impinge upon the mitral valve but with no noted attachment to the valve.  Right Ventricle: The right ventricle is normal in size. There is normal right ventricular global systolic function.  Right Atrium: The right atrium is normal in size.  Aortic Valve: The aortic valve appears structurally normal. There is no evidence of aortic valve stenosis. There is mild aortic valve regurgitation.  Mitral Valve: The mitral valve was not well visualized. There is not well visualized. Mitral valve regurgitation was not assessed. (See post study for additional findings).  Tricuspid Valve: The tricuspid valve is structurally normal. There is mild tricuspid regurgitation.  Pulmonic Valve: The pulmonic valve was not assessed. Pulmonic valve regurgitation was not assessed.  Pericardium: There is no pericardial effusion noted.  Aorta: The aortic root is normal. There is no evidence of aortic dissection. The  "descending aorta has normal intimal thickness <2mm.  In comparison to the previous echocardiogram(s): Not performed on intraoperative study.        CONCLUSIONS:   1. The left ventricular systolic function is normal, with a visually estimated ejection fraction of 65%.   2. Left ventricular diastolic filling was indeterminate.   3. Left ventricular cavity size is decreased.   4. There is normal right ventricular global systolic function.   5. Aortic valve stenosis is not present.   6. Mild aortic valve regurgitation.     POST CARDIOPULMONARY BYPASS REPORT:  Patient has a normal sinus rhythm. LV function is unchanged from pre-pump exam. RV function is unchanged from pre-pump exam. Previously noted left atrial mass, no longer visible. No evidence of mitral regurgitation or stenosis. No PFO by color doppler or saline contrast study. No evidence of post aortic cannulation dissection. All transesophageal echocardiogram findings discussed with surgeon. Please see MV comments section.     QUANTITATIVE DATA SUMMARY:  LV SYSTOLIC FUNCTION BY 2D PLANIMETRY (MOD):                       Normal Ranges:  EF-Visual:      65 %  LV EF Reported: 65 %      Assessment and Plan:         Patient is a 64-year-old female scheduled for a with Dr. Cotter on  9/11/24.  Patient has no active cardiac symptoms.   Patient denies any chest pain, tightness, heaviness, pressure, radiating pain, palpitations, irregular heartbeats, lightheadedness, cough, congestion, shortness of breath, WOOD, PND, near syncope, weight loss or gain.     RCRI  1  , 6 % Risk of MACE    Cardiology:  -- History of myxoma of the heart s/p excision 8/2/24. RECEIVED CARDIAC CLEARANCE FROM DR. CELESTE FLORES 8/20/24 \"She can undergo colon surgery if needed to be done, she is intermediate risk. I advised to wait 30 days from the day of surgery\"       Hematology:  Patient instructed to ambulate as soon as possible postoperatively to decrease thromboembolic risk.   Initiate " mechanical DVT prophylaxis as soon as possible and initiate chemical prophylaxis when deemed safe from a bleeding standpoint post surgery.   -- recent labs demonstrate thrombocytosis. No further testing required at this time      Caprini: 8    Risk assessment complete.  Patient is scheduled for a low surgical risk procedure.        Preoperative medication instructions were provided and reviewed with the patient.  Any additional testing or evaluation was explained to the patient.  Nothing by mouth instructions were discussed and patient's questions were answered prior to conclusion to this encounter.  Patient verbalized understanding of preoperative instructions given in preadmission testing; discharge instructions available in EMR.    This note was dictated by a speech recognition.  Minor errors may have been detected in a speech recognition.

## 2024-08-23 NOTE — H&P (VIEW-ONLY)
[facility] Evaluation       Name: Maribel Lakhani (Maribel Lakhani)  /Age: 1960/64 y.o.     In-Person           HPI        Date of Consult: 24    Referring Provider: Dr. Cotter    Surgery, Date, and Length: Laparoscopic Extended Right Colectomy; Ileocolic Anastomosis, 24    Maribel Lakhani is a 64 year-old female who presents to the [facility] for perioperative risk assessment prior to surgery.    Patient presents with a primary diagnosis of colon cancer. CT CAP   IMPRESSION: A large filling defect seen in the left atrium which may represent  myxoma versus thrombus. Echocardiogram advised. No features of metastatic disease to the chest. Reported history of colonic mass. Stool obscures evaluation of the colon. Slight hepatic steatosis. Senescent changes detected.    This note was created in part upon personal review of patient's medical records.      Patient is scheduled to have Laparoscopic Extended Right Colectomy; Ileocolic Anastomosis      Pt denies any past history of anesthetic complications such as PONV, awareness, prolonged sedation, dental damage, aspiration, cardiac arrest, difficult intubation, difficult I.V. access or unexpected hospital admissions.  NO malignant hyperthermia and or pseudocholinesterase deficiency.  No history of blood transfusions     STOP BANG 2    The patient is not a Mu-ism and will accept blood and blood products if medically indicated.   Type and screen sent.     Past Medical History:   Diagnosis Date    Anxiety and depression     Cardiology follow-up encounter 2024    Dianne Coppola MD    Encounter for pre-operative cardiovascular clearance 2024    Dianne Coppola MD- She can undergo colon surgery if needed to be done, she is intermediate risk. I advised to wait 30 days from the day of surgery    H/O transesophageal echocardiography (FABIOLA) for monitoring 2024    CONCLUSIONS:  1. The left ventricular systolic function is normal, with a visually  "estimated ejection fraction of 65%.  2. Left ventricular diastolic filling was indeterminate.  3. Left ventricular cavity size is decreased.  4. There is normal right ventricular global systolic function.  5. Aortic valve stenosis is not present.  6. Mild aortic valve regurgitation.    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    CARDIAC CATHETERIZATION N/A 2024    Procedure: Left Heart Cath;  Surgeon: Dariel Dumont MD;  Location: [facility] Cardiac Cath Lab;  Service: Cardiovascular;  Laterality: N/A;    DILATION AND CURETTAGE OF UTERUS      FOOT SURGERY Left     MOUTH SURGERY  1984    tooth extraction       Social History     Tobacco Use    Smoking status: Former     Current packs/day: 0.00     Average packs/day: 0.5 packs/day for 15.0 years (7.5 ttl pk-yrs)     Types: Cigarettes     Quit date: 7/15/2024     Years since quittin.1     Passive exposure: Past    Smokeless tobacco: Never   Vaping Use    Vaping status: Never Used   Substance Use Topics    Alcohol use: Yes     Alcohol/week: 4.0 standard drinks of alcohol     Types: 4 Glasses of wine per week    Drug use: Never        Family History   Problem Relation Name Age of Onset    Lupus Mother      Sjogren's syndrome Mother      Fibromyalgia Mother      Cancer Father CASEYPako Joby Mercado     Hearing loss Father CASEYPako Joby Mercado     Cancer Maternal Grandmother Catia Santa      Heart Rate:  [83]   Temp:  [36 °C (96.8 °F)]   Resp:  [18]   BP: (116)/(87)   Height:  [172.7 cm (5' 7.99\")]   Weight:  [65.5 kg (144 lb 6.4 oz)] "   SpO2:  [98 %]      Allergies   Allergen Reactions    Adhesive Tape-Silicones Itching    Ampicillin Rash    Cefdinir Rash    [id number]-7-[id number] Antimigraine Agents Anxiety       Current Outpatient Medications:     acetaminophen (Tylenol) 325 mg tablet, Take 2 tablets (650 mg) by mouth every 6 hours if needed for mild pain (1 - 3)., Disp: , Rfl:     aspirin 81 mg EC tablet, Take 1 tablet (81 mg) by mouth once daily., Disp: 30 tablet, Rfl: 0    ezetimibe (Zetia) 10 mg tablet, Take 1 tablet (10 mg) by mouth once daily., Disp: 90 tablet, Rfl: 3    metoprolol tartrate (Lopressor) 25 mg tablet, Take 0.5 tablets (12.5 mg) by mouth 2 times a day., Disp: 30 tablet, Rfl: 0    mv-mn/folic ac/calcium/vit K1 (WOMEN'S 50 PLUS MULTIVITAMIN ORAL), Take 1 tablet by mouth once daily., Disp: , Rfl:     oxyCODONE (Roxicodone) 5 mg immediate release tablet, Take 1 tablet (5 mg) by mouth every 6 hours if needed for severe pain (7 - 10) for up to 7 days., Disp: 28 tablet, Rfl: 0    traZODone (Desyrel) 100 mg tablet, Take 1 tablet (100 mg) by mouth once daily., Disp: 90 tablet, Rfl: 3    venlafaxine XR (Effexor-XR) 37.5 mg 24 hr capsule, Take 1 capsule (37.5 mg) by mouth once daily. Do not crush or chew., Disp: 30 capsule, Rfl: 11    chlorhexidine (Peridex) 0.12 % solution, Use 15 mL in the mouth or throat 2 times a day. Use night before surgery and morning of surgery Swish and spit, Disp: 473 mL, Rfl: 0    gabapentin (Neurontin) 100 mg capsule, Take one capsule by mouth starting three days before surgery at bedtime. (Patient not taking: Reported on 8/23/2024), Disp: 3 capsule, Rfl: 0    metroNIDAZOLE (Flagyl) 250 mg tablet, Take one tablet at 6p, 7p, and 11p the night before surgery. (Patient not taking: Reported on 8/23/2024), Disp: 3 tablet, Rfl: 0    neomycin (Mycifradin) 500 mg tablet, Take two tabs by mouth at 6p, 7pm, and 11p the night before surgery. (Patient not taking: Reported on 8/23/2024), Disp: 6 tablet, Rfl: 0        PAT ROS:    Constitutional:   neg    Neuro/Psych:   neg    Eyes:    Wears glasses  Ears:   neg    Nose:   neg    Mouth:   neg    Throat:   neg    Neck:   neg    Cardio:    Functional 2 mets. Physical activity limited due to patient recently excision of myxoma 8/2/24 Patient not cleared for cardiac rehab   Respiratory:   neg    Endocrine:   neg    GI:   neg    :   neg    Musculoskeletal:   neg    Hematologic:   neg    Skin:   Right chest wall bandage to cover incision      Physical Exam  Vitals reviewed. Physical exam within normal limits.          PAT AIRWAY:   Airway:     Mallampati::  II    Neck ROM::  Full  normal        Lab Results   Component Value Date    GLUCOSE 90 08/14/2024    CALCIUM 10.0 08/14/2024     08/14/2024    K 4.5 08/14/2024    CO2 28 08/14/2024     08/14/2024    BUN 12 08/14/2024    CREATININE 0.68 08/14/2024      Lab Results   Component Value Date    WBC 6.6 08/14/2024    HGB 12.6 08/14/2024    HCT 38.7 08/14/2024    MCV 96 08/14/2024     (H) 08/14/2024        Encounter Date: 08/02/24   Electrocardiogram 12-lead PRN for arrhythmia   Result Value    Ventricular Rate 57    Atrial Rate 57    IN Interval 172    QRS Duration 84    QT Interval 438    QTC Calculation(Bazett) 426    R Axis 66    T Axis 42    QRS Count 10    Q Onset 224    P Onset 138    P Offset 202    T Offset 443    QTC Fredericia 430    Narrative    Sinus bradycardia  Otherwise normal ECG  When compared with ECG of 02-AUG-2024 14:18,  QRS axis Shifted right  Criteria for Septal infarct are no longer Present  ST elevation now present in Anterior leads  Confirmed by Philippe Kwan ([id number]) on 8/8/2024 2:35:52 PM      PHYSICIAN INTERPRETATION:  FABIOLA Details: The FABIOLA probe used was X*-2t. Technically adequate omniplane transesophageal echocardiogram performed. Color flow Doppler and agitated saline contrast echo was performed to assess for the presence of a patent foramen ovale.  FABIOLA Medication: Anesthesia IOTEE was used to sedate  the patient for this exam.  FABIOLA Procedure: The probe was passed without difficulty. Complications encountered during procedure: Patient tolerated the procedure well without any apparent complications.  Left Ventricle: The left ventricular systolic function is normal, with a visually estimated ejection fraction of 65%. The left ventricular cavity size is decreased. The left ventricular septal wall thickness is normal. Left ventricular diastolic filling was indeterminate. (See post study for additional findings).  Left Atrium: The left atrium was not well visualized. There is no evidence of a patent foramen ovale. There is a normal sized left atrial appendage, the left atrial appendage Doppler velocities are normal, there is no thrombus visualized in the left atrial appendage and there is spontaneous contrast noted in the left atrial appendage. A large left atrial mass consistent with a myxoma, measuring 4.17 cm x 3.25 cm in the largest diameters was noted with the stalk adherent to the interatrial septum. The mass was noted to impinge upon the mitral valve but with no noted attachment to the valve.  Right Ventricle: The right ventricle is normal in size. There is normal right ventricular global systolic function.  Right Atrium: The right atrium is normal in size.  Aortic Valve: The aortic valve appears structurally normal. There is no evidence of aortic valve stenosis. There is mild aortic valve regurgitation.  Mitral Valve: The mitral valve was not well visualized. There is not well visualized. Mitral valve regurgitation was not assessed. (See post study for additional findings).  Tricuspid Valve: The tricuspid valve is structurally normal. There is mild tricuspid regurgitation.  Pulmonic Valve: The pulmonic valve was not assessed. Pulmonic valve regurgitation was not assessed.  Pericardium: There is no pericardial effusion noted.  Aorta: The aortic root is normal. There is no evidence of aortic dissection. The  "descending aorta has normal intimal thickness <2mm.  In comparison to the previous echocardiogram(s): Not performed on intraoperative study.        CONCLUSIONS:   1. The left ventricular systolic function is normal, with a visually estimated ejection fraction of 65%.   2. Left ventricular diastolic filling was indeterminate.   3. Left ventricular cavity size is decreased.   4. There is normal right ventricular global systolic function.   5. Aortic valve stenosis is not present.   6. Mild aortic valve regurgitation.     POST CARDIOPULMONARY BYPASS REPORT:  Patient has a normal sinus rhythm. LV function is unchanged from pre-pump exam. RV function is unchanged from pre-pump exam. Previously noted left atrial mass, no longer visible. No evidence of mitral regurgitation or stenosis. No PFO by color doppler or saline contrast study. No evidence of post aortic cannulation dissection. All transesophageal echocardiogram findings discussed with surgeon. Please see MV comments section.     QUANTITATIVE DATA SUMMARY:  LV SYSTOLIC FUNCTION BY 2D PLANIMETRY (MOD):                       Normal Ranges:  EF-Visual:      65 %  LV EF Reported: 65 %      Assessment and Plan:         Patient is a 64-year-old female scheduled for a with Dr. Cotter on  9/11/24.  Patient has no active cardiac symptoms.   Patient denies any chest pain, tightness, heaviness, pressure, radiating pain, palpitations, irregular heartbeats, lightheadedness, cough, congestion, shortness of breath, WOOD, PND, near syncope, weight loss or gain.     RCRI  1  , 6 % Risk of MACE    Cardiology:  -- History of myxoma of the heart s/p excision 8/2/24. RECEIVED CARDIAC CLEARANCE FROM DR. CELESTE FLORES 8/20/24 \"She can undergo colon surgery if needed to be done, she is intermediate risk. I advised to wait 30 days from the day of surgery\"       Hematology:  Patient instructed to ambulate as soon as possible postoperatively to decrease thromboembolic risk.   Initiate " mechanical DVT prophylaxis as soon as possible and initiate chemical prophylaxis when deemed safe from a bleeding standpoint post surgery.   -- recent labs demonstrate thrombocytosis. No further testing required at this time      Caprini: 8    Risk assessment complete.  Patient is scheduled for a low surgical risk procedure.        Preoperative medication instructions were provided and reviewed with the patient.  Any additional testing or evaluation was explained to the patient.  Nothing by mouth instructions were discussed and patient's questions were answered prior to conclusion to this encounter.  Patient verbalized understanding of preoperative instructions given in preadmission testing; discharge instructions available in EMR.    This note was dictated by a speech recognition.  Minor errors may have been detected in a speech recognition.

## 2024-08-23 NOTE — CPM/PAT NURSE NOTE
CPM/PAT Nurse Note      Name: Maribel Lakhani (Maribel Lakhani)  /Age: 1960/64 y.o.       Past Medical History:   Diagnosis Date    Anxiety and depression     Cardiology follow-up encounter 2024    Dianne Coppola MD    Encounter for pre-operative cardiovascular clearance 2024    Dianne Coppola MD- She can undergo colon surgery if needed to be done, she is intermediate risk. I advised to wait 30 days from the day of surgery    H/O transesophageal echocardiography (FABIOLA) for monitoring 2024    CONCLUSIONS:  1. The left ventricular systolic function is normal, with a visually estimated ejection fraction of 65%.  2. Left ventricular diastolic filling was indeterminate.  3. Left ventricular cavity size is decreased.  4. There is normal right ventricular global systolic function.  5. Aortic valve stenosis is not present.  6. Mild aortic valve regurgitation.    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    Family History   Problem Relation Name Age of Onset    Lupus Mother      Sjogren's syndrome Mother      Fibromyalgia Mother      Cancer Father CASEYPako Joby Mercado     Hearing loss Father CASEYPako Joby Mercado     Cancer  Maternal Grandmother Catia Santa        Allergies   Allergen Reactions    Adhesive Tape-Silicones Itching    Ampicillin Rash    Cefdinir Rash    [id number] Antimigraine Agents Anxiety       Prior to Admission medications    Medication Sig Start Date End Date Taking? Authorizing Provider   acetaminophen (Tylenol) 325 mg tablet Take 2 tablets (650 mg) by mouth every 6 hours if needed for mild pain (1 - 3). 8/7/24   Ramu Mccloud PA-C   aspirin 81 mg EC tablet Take 1 tablet (81 mg) by mouth once daily. 8/7/24 9/6/24  Ramu Mccloud PA-C   chlorhexidine (Peridex) 0.12 % solution Use 15 mL in the mouth or throat 2 times a day. Use night before surgery and morning of surgery Swish and spit 8/23/24   CARRI Zhou-CNP   ezetimibe (Zetia) 10 mg tablet Take 1 tablet (10 mg) by mouth once daily. 2/20/24   Albert Berry MD   gabapentin (Neurontin) 100 mg capsule Take one capsule by mouth starting three days before surgery at bedtime.  Patient not taking: Reported on 8/23/2024 8/20/24   Yann Cotter MD   metoprolol tartrate (Lopressor) 25 mg tablet Take 0.5 tablets (12.5 mg) by mouth 2 times a day. 8/7/24 9/6/24  Ramu Mccloud PA-C   metroNIDAZOLE (Flagyl) 250 mg tablet Take one tablet at 6p, 7p, and 11p the night before surgery.  Patient not taking: Reported on 8/23/2024 8/20/24   Yann Cotter MD   mv-mn/folic ac/calcium/vit K1 (WOMEN'S 50 PLUS MULTIVITAMIN ORAL) Take 1 tablet by mouth once daily.    Historical Provider, MD   neomycin (Mycifradin) 500 mg tablet Take two tabs by mouth at 6p, 7pm, and 11p the night before surgery.  Patient not taking: Reported on 8/23/2024 8/20/24   Yann Cotter MD   oxyCODONE (Roxicodone) 5 mg immediate release tablet Take 1 tablet (5 mg) by mouth every 6 hours if needed for severe pain (7 - 10) for up to 7 days. 8/16/24 8/23/24  Albert Berry MD   traZODone (Desyrel) 100 mg tablet Take 1 tablet (100 mg) by mouth once daily. 2/20/24  2/19/25  Albert Berry MD   venlafaxine XR (Effexor-XR) 37.5 mg 24 hr capsule Take 1 capsule (37.5 mg) by mouth once daily. Do not crush or chew. 5/1/24 5/1/25  Albert Berry MD   methocarbamol (Robaxin) 750 mg tablet Take 1 tablet (750 mg) by mouth every 8 hours for 2 days. 8/7/24 8/19/24  RYLIE Ricks     DASI Risk Score    No data to display       Caprini DVT Assessment    No data to display       Modified Frailty Index    No data to display       CHADS2 Stroke Risk  Current as of 2 hours ago        N/A 3 to 100%: High Risk   2 to < 3%: Medium Risk   0 to < 2%: Low Risk     Last Change: N/A          This score determines the patient's risk of having a stroke if the patient has atrial fibrillation.        This score is not applicable to this patient. Components are not calculated.          Revised Cardiac Risk Index    No data to display       Apfel Simplified Score    No data to display       Risk Analysis Index Results This Encounter    No data found in the last 1 encounters.       After Visit Summary (AVS) reviewed and patient verbalized good understanding of medications and NPO instructions.     Nurse Plan of Action:

## 2024-08-25 LAB — STAPHYLOCOCCUS SPEC CULT: NORMAL

## 2024-08-27 DIAGNOSIS — C18.9 COLON CANCER (MULTI): Primary | ICD-10-CM

## 2024-08-27 RX ORDER — METRONIDAZOLE 500 MG/100ML
500 INJECTION, SOLUTION INTRAVENOUS ONCE
OUTPATIENT
Start: 2024-08-27 | End: 2024-08-27

## 2024-08-27 RX ORDER — SODIUM CHLORIDE, SODIUM LACTATE, POTASSIUM CHLORIDE, CALCIUM CHLORIDE 600; 310; 30; 20 MG/100ML; MG/100ML; MG/100ML; MG/100ML
20 INJECTION, SOLUTION INTRAVENOUS CONTINUOUS
OUTPATIENT
Start: 2024-08-27

## 2024-08-27 RX ORDER — HEPARIN SODIUM 5000 [USP'U]/ML
5000 INJECTION, SOLUTION INTRAVENOUS; SUBCUTANEOUS ONCE
OUTPATIENT
Start: 2024-08-27 | End: 2024-08-27

## 2024-08-27 RX ORDER — LEVOFLOXACIN 5 MG/ML
500 INJECTION, SOLUTION INTRAVENOUS ONCE
OUTPATIENT
Start: 2024-08-27 | End: 2024-08-27

## 2024-08-29 ENCOUNTER — TELEMEDICINE CLINICAL SUPPORT (OUTPATIENT)
Dept: CARDIAC SURGERY | Facility: HOSPITAL | Age: 64
End: 2024-08-29
Payer: COMMERCIAL

## 2024-08-29 DIAGNOSIS — D15.1 MYXOMA OF HEART (HHS-HCC): Primary | ICD-10-CM

## 2024-08-29 NOTE — PROGRESS NOTES
Patient is 27 days s/p minimally invasive myxoma excision with Dr. Morales. Patient denies chest pain, shortness of breath, and swelling of lower extremities.  Patient states her right break is sore and numb. Provided DME phone number for patient to order surgical bra. She is taking Tylenol and Oxycodone as needed. Patient states that incision looks to be healing well, but a stitch remains under her arm. I advised her Dr. Morales will examine the incision and remaining stitch next week during post-op visit. Denies bleeding/draining/odor coming from incision. Appetite has increased since discharge. Denies any complications with constipation. Patient is ambulating often, with rest periods in between. Continues to use breathing exercise devices as instructed at discharge. Reviewed medications and future follow up visits with patient including chest xray to be done prior to P/OP visit.     Patient educated on the importance of increasing level of activity with rest periods in between. Advised patient she may drive only when she is no longer taking Oxycodone and is pain free. Encouraged continued use of breathing exercise devices to increase lung expansion. Reviewed patient's medications and dosing schedule. Confirmed knowledge of future follow up doctor appointments.    It was pleasure speaking with MsPako Kar today.

## 2024-08-30 ENCOUNTER — HOME CARE VISIT (OUTPATIENT)
Dept: HOME HEALTH SERVICES | Facility: HOME HEALTH | Age: 64
End: 2024-08-30
Payer: COMMERCIAL

## 2024-08-30 ENCOUNTER — APPOINTMENT (OUTPATIENT)
Dept: HOME HEALTH SERVICES | Facility: HOME HEALTH | Age: 64
End: 2024-08-30
Payer: COMMERCIAL

## 2024-08-30 VITALS
DIASTOLIC BLOOD PRESSURE: 62 MMHG | RESPIRATION RATE: 18 BRPM | HEART RATE: 68 BPM | SYSTOLIC BLOOD PRESSURE: 120 MMHG | OXYGEN SATURATION: 97 % | TEMPERATURE: 97 F

## 2024-08-30 PROCEDURE — G0299 HHS/HOSPICE OF RN EA 15 MIN: HCPCS

## 2024-08-30 ASSESSMENT — ACTIVITIES OF DAILY LIVING (ADL)
AMBULATION ASSISTANCE: 1
TOILETING: 1
TOILETING: INDEPENDENT
OASIS_M1830: 00
HOME_HEALTH_OASIS: 00
AMBULATION ASSISTANCE: INDEPENDENT

## 2024-08-30 NOTE — HOME HEALTH
Helped with bowel prep instructions for upcoming surgery 9.11  and the chlorhexidine wash/scrub that she has to do prior to the surgery five days.    Also assisted her ensure surgery and confirmed the instructions with Dr. Cotter her planned surgeon.

## 2024-08-30 NOTE — CASE COMMUNICATION
Patient discharged from homecare at this time. No further disciplines remaining in.  All goals are achieved, there are no further skilled needs.    She was supposed to be discharged last week, but she was seen by LPN and requested additional visit by this RN for discharge, education and assessment.

## 2024-08-31 ENCOUNTER — APPOINTMENT (OUTPATIENT)
Dept: HOME HEALTH SERVICES | Facility: HOME HEALTH | Age: 64
End: 2024-08-31
Payer: COMMERCIAL

## 2024-09-03 DIAGNOSIS — D15.1 MYXOMA OF HEART (HHS-HCC): ICD-10-CM

## 2024-09-04 ENCOUNTER — HOSPITAL ENCOUNTER (OUTPATIENT)
Dept: RADIOLOGY | Facility: HOSPITAL | Age: 64
Discharge: HOME | End: 2024-09-04
Payer: COMMERCIAL

## 2024-09-04 ENCOUNTER — OFFICE VISIT (OUTPATIENT)
Dept: CARDIAC SURGERY | Facility: HOSPITAL | Age: 64
End: 2024-09-04
Payer: COMMERCIAL

## 2024-09-04 VITALS
OXYGEN SATURATION: 95 % | DIASTOLIC BLOOD PRESSURE: 78 MMHG | SYSTOLIC BLOOD PRESSURE: 113 MMHG | HEIGHT: 68 IN | HEART RATE: 84 BPM | WEIGHT: 144.8 LBS | BODY MASS INDEX: 21.95 KG/M2

## 2024-09-04 DIAGNOSIS — D15.1 MYXOMA OF HEART (HHS-HCC): Primary | ICD-10-CM

## 2024-09-04 DIAGNOSIS — D15.1 MYXOMA OF HEART (HHS-HCC): ICD-10-CM

## 2024-09-04 PROCEDURE — 3008F BODY MASS INDEX DOCD: CPT | Performed by: STUDENT IN AN ORGANIZED HEALTH CARE EDUCATION/TRAINING PROGRAM

## 2024-09-04 PROCEDURE — 71046 X-RAY EXAM CHEST 2 VIEWS: CPT

## 2024-09-04 PROCEDURE — 99211 OFF/OP EST MAY X REQ PHY/QHP: CPT | Performed by: STUDENT IN AN ORGANIZED HEALTH CARE EDUCATION/TRAINING PROGRAM

## 2024-09-04 PROCEDURE — 71046 X-RAY EXAM CHEST 2 VIEWS: CPT | Performed by: RADIOLOGY

## 2024-09-04 RX ORDER — METOPROLOL TARTRATE 25 MG/1
12.5 TABLET, FILM COATED ORAL 2 TIMES DAILY
Qty: 90 TABLET | Refills: 3 | Status: SHIPPED | OUTPATIENT
Start: 2024-09-04 | End: 2025-09-04

## 2024-09-04 ASSESSMENT — PAIN SCALES - GENERAL: PAINLEVEL: 4

## 2024-09-04 NOTE — PROGRESS NOTES
Chief Complaint      HPI:   Ms. Maribel Lakhani is a 64 y.o. female, who presents for post-operative evaluation.   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  She has resumed normal activities and her appetite is returned to normal.  She has no chest pain, no shortness of breath and denies palpitations, dizziness, or syncope.  She does have some numbness on her skin at the incision site and has some teathering of the skin at the incision site.      Past Medical History:   Diagnosis Date    Anxiety and depression     Cardiology follow-up encounter 08/19/2024    Dianne Coppola MD    Encounter for pre-operative cardiovascular clearance 08/19/2024    Dianne Coppola MD- She can undergo colon surgery if needed to be done, she is intermediate risk. I advised to wait 30 days from the day of surgery    H/O transesophageal echocardiography (FABIOLA) for monitoring 08/02/2024    CONCLUSIONS:  1. The left ventricular systolic function is normal, with a visually estimated ejection fraction of 65%.  2. Left ventricular diastolic filling was indeterminate.  3. Left ventricular cavity size is decreased.  4. There is normal right ventricular global systolic function.  5. Aortic valve stenosis is not present.  6. Mild aortic valve regurgitation.    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    CARDIAC CATHETERIZATION N/A 07/26/2024    Procedure: Left Heart Cath;  Surgeon: Dariel Dumont MD;  Location: [facility] Cardiac [facility];  Service: Cardiovascular;  Laterality: N/A;     DILATION AND CURETTAGE OF UTERUS      FOOT SURGERY Left     MOUTH SURGERY  1984    tooth extraction       Family History   Problem Relation Name Age of Onset    Lupus Mother      Sjogren's syndrome Mother      Fibromyalgia Mother      Cancer Father JASON Mercado     Hearing loss Father JASON Mercado     Cancer Maternal Grandmother Catia Santa        Social History     Socioeconomic History    Marital status:      Spouse name: Not on file    Number of children: Not on file    Years of education: Not on file    Highest education level: Not on file   Occupational History    Not on file   Tobacco Use    Smoking status: Former     Current packs/day: 0.00     Average packs/day: 0.5 packs/day for 15.0 years (7.5 ttl pk-yrs)     Types: Cigarettes     Quit date: 7/15/2024     Years since quittin.1     Passive exposure: Past    Smokeless tobacco: Never   Vaping Use    Vaping status: Never Used   Substance and Sexual Activity    Alcohol use: Yes     Alcohol/week: 4.0 standard drinks of alcohol     Types: 4 Glasses of wine per week    Drug use: Never    Sexual activity: Defer   Other Topics Concern    Not on file   Social History Narrative    Not on file     Social Determinants of Health     Financial Resource Strain: Not on file   Food Insecurity: Not on file   Transportation Needs: No Transportation Needs (2024)    OASIS : Transportation     Lack of Transportation (Medical): No     Lack of Transportation (Non-Medical): No     Patient Unable or Declines to Respond: No   Physical Activity: Not on file   Stress: Not on file   Social Connections: Feeling Socially Integrated (2024)    OASIS : Social Isolation     Frequency of experiencing loneliness or isolation: Never   Intimate Partner Violence: Not on file   Housing Stability: Not on file       Allergies   Allergen Reactions    Adhesive Tape-Silicones Itching    Ampicillin Rash    Cefdinir Rash    [id number] Antimigraine Agents  Anxiety       Outpatient Encounter Medications as of 9/4/2024   Medication Sig Dispense Refill    acetaminophen (Tylenol) 325 mg tablet Take 2 tablets (650 mg) by mouth every 6 hours if needed for mild pain (1 - 3).      aspirin 81 mg EC tablet Take 1 tablet (81 mg) by mouth once daily. 30 tablet 0    chlorhexidine (Peridex) 0.12 % solution Use 15 mL in the mouth or throat 2 times a day. Use night before surgery and morning of surgery Swish and spit 473 mL 0    ezetimibe (Zetia) 10 mg tablet Take 1 tablet (10 mg) by mouth once daily. 90 tablet 3    gabapentin (Neurontin) 100 mg capsule Take one capsule by mouth starting three days before surgery at bedtime. 3 capsule 0    metoprolol tartrate (Lopressor) 25 mg tablet Take 0.5 tablets (12.5 mg) by mouth 2 times a day. 90 tablet 3    metroNIDAZOLE (Flagyl) 250 mg tablet Take one tablet at 6p, 7p, and 11p the night before surgery. 3 tablet 0    mv-mn/folic ac/calcium/vit K1 (WOMEN'S 50 PLUS MULTIVITAMIN ORAL) Take 1 tablet by mouth once daily.      neomycin (Mycifradin) 500 mg tablet Take two tabs by mouth at 6p, 7pm, and 11p the night before surgery. 6 tablet 0    traZODone (Desyrel) 100 mg tablet Take 1 tablet (100 mg) by mouth once daily. 90 tablet 3    venlafaxine XR (Effexor-XR) 37.5 mg 24 hr capsule Take 1 capsule (37.5 mg) by mouth once daily. Do not crush or chew. 30 capsule 11    [DISCONTINUED] metoprolol tartrate (Lopressor) 25 mg tablet Take 0.5 tablets (12.5 mg) by mouth 2 times a day. 30 tablet 0     No facility-administered encounter medications on file as of 9/4/2024.       Physical Exam   General: no acute distress  Cardiovascular: Regular rate and rhythm  Respiratory: symmetrical chest rise and fall, no increased work of breathing  Wound: incisions well healed, mild teathering of the incision to the underlying muscle  Extremities: no edema       Results: CXR clear without infiltrate or effusion     Assessment and Plan:    Ms. Maribel Lakhani is a 64 y.o.  female, who is recovering well after surgery.  Path reviewed and demonstrates atrial myxoma.     Referral in place for cardiac rehab  Ok to drive, ok to return to normal activities   She also has a know colon cancer and is scheduled for colon resection next week.  Cleared for that surgery from my standpoint.   Will see Dr. Dumont but has not had an appointment yet.  Does not need to return to clinic, but was instructed to call if any issues arise.    If her incision remains tethered and it is bothersome to her, I am happy to see her back for revision of the incision.       Rosaura Morales MD  09/04/24  5:33 PM

## 2024-09-08 NOTE — PROGRESS NOTES
Referred by Dr. Coppola for No chief complaint on file.     History Of Present Illness:    Maribel Lakhani is a 64 y.o. female presenting with new finding of atrial mass.  Patient was undergoing CT scan for workup of newly diagnosed colon cancer and the mass was identified. She denies any current symptoms of shortness of breath or chest pain.      Past Medical History:  She has a past medical history of Anxiety and depression, Cardiology follow-up encounter (08/19/2024), Encounter for pre-operative cardiovascular clearance (08/19/2024), H/O transesophageal echocardiography (FABIOLA) for monitoring (08/02/2024), Hemorrhoidal skin tag (08/29/2023), History of cardiac cath (07/26/2024), Hyperlipidemia, Insomnia, Lumbar spondylosis, Malignant neoplasm of transverse colon (Multi), and Myxoma.    Past Surgical History:  She has a past surgical history that includes Foot surgery (Left); Mouth surgery (1984); Dilation and curettage of uterus; Cardiac catheterization (N/A, 07/26/2024); and Excision atrial myxoma (08/02/2024).      Social History:  She reports that she quit smoking about 7 weeks ago. Her smoking use included cigarettes. She has a 7.5 pack-year smoking history. She has been exposed to tobacco smoke. She has never used smokeless tobacco. She reports current alcohol use of about 4.0 standard drinks of alcohol per week. She reports that she does not use drugs.    Family History:  Family History   Problem Relation Name Age of Onset    Lupus Mother      Sjogren's syndrome Mother      Fibromyalgia Mother      Cancer Father CASEYPako Joby Mercado     Hearing loss Father CASEYPako Joby Mercado     Cancer Maternal Grandmother Catia Santa         Allergies:  Adhesive tape-silicones, Ampicillin, Cefdinir, and [id number] antimigraine agents    Outpatient Medications:  Current Outpatient Medications   Medication Instructions    acetaminophen (TYLENOL) 650 mg, oral, Every 6 hours PRN    chlorhexidine (Peridex) 0.12 % solution 15  "mL, Mouth/Throat, 2 times daily, Use night before surgery and morning of surgery Swish and spit    ezetimibe (ZETIA) 10 mg, oral, Daily    gabapentin (Neurontin) 100 mg capsule Take one capsule by mouth starting three days before surgery at bedtime.    metoprolol tartrate (LOPRESSOR) 12.5 mg, oral, 2 times daily    metroNIDAZOLE (Flagyl) 250 mg tablet Take one tablet at 6p, 7p, and 11p the night before surgery.    mv-mn/folic ac/calcium/vit K1 (WOMEN'S 50 PLUS MULTIVITAMIN ORAL) 1 tablet, oral, Daily    neomycin (Mycifradin) 500 mg tablet Take two tabs by mouth at 6p, 7pm, and 11p the night before surgery.    traZODone (DESYREL) 100 mg, oral, Daily    venlafaxine XR (EFFEXOR-XR) 37.5 mg, oral, Daily, Do not crush or chew.        Last Recorded Vitals:  Vitals:    07/17/24 1305   BP: 124/80   BP Location: Left arm   Patient Position: Sitting   Pulse: 77   SpO2: 96%   Weight: 67.1 kg (148 lb)   Height: 1.727 m (5' 8\")       Physical Exam:  General: no acute distress  Cardiovascular: Regular rate and rhythm  Respiratory: symmetrical chest rise and fall, no increased work of breathing  Extremities: no edema          Last Labs:  CBC -  Lab Results   Component Value Date    WBC 6.6 08/14/2024    HGB 12.6 08/14/2024    HCT 38.7 08/14/2024    MCV 96 08/14/2024     (H) 08/14/2024       CMP -  Lab Results   Component Value Date    CALCIUM 10.0 08/14/2024    PHOS 4.6 08/14/2024    PROT 6.9 07/31/2024    ALBUMIN 4.2 08/14/2024    AST 20 07/31/2024    ALT 15 07/31/2024    ALKPHOS 63 07/31/2024    BILITOT 0.4 07/31/2024       LIPID PANEL -   Lab Results   Component Value Date    CHOL 191 08/28/2023    TRIG 71 08/28/2023    HDL 64.3 08/28/2023    CHHDL 3.0 08/28/2023    LDLF 113 (H) 08/28/2023    VLDL 14 08/28/2023       RENAL FUNCTION PANEL -   Lab Results   Component Value Date    GLUCOSE 90 08/14/2024     08/14/2024    K 4.5 08/14/2024     08/14/2024    CO2 28 08/14/2024    ANIONGAP 14 08/14/2024    BUN 12 " 08/14/2024    CREATININE 0.68 08/14/2024    CALCIUM 10.0 08/14/2024    PHOS 4.6 08/14/2024    ALBUMIN 4.2 08/14/2024        Lab Results   Component Value Date    HGBA1C 4.9 07/31/2024       Last Cardiology Tests:    Echo:  Atrial myxoma of the left atrium, prolapsing into the left ventricle    Ejection Fractions:  EF   Date/Time Value Ref Range Status   08/02/2024 07:15 AM 65 %          Assessment/Plan   64 yr/old female with newly diagnosed colon cancer and incidental finding of large left atrial mass most consistent on imaging with an atrial myxoma.  Appears to be attached at the septum but is large and prolapsing beyond the mitral valve.      Plan is for OR for mass excision via a right mini thoracotomy   Will need cardiac cath prior to OR  Pre op labs and tests ordered    The risks benefits and alternatives were discussed with the patient and include but are not limited to, bleeding, infection, injury to other structures, need for re-operation, arrhythmia, respiratory failure, prolonged intubation, stroke, and death.  These were discussed with the patient in detail, all questions were answered and informed consent was obtained.            Rosaura Morales MD

## 2024-09-11 ENCOUNTER — ANESTHESIA EVENT (OUTPATIENT)
Dept: OPERATING ROOM | Facility: HOSPITAL | Age: 64
End: 2024-09-11
Payer: COMMERCIAL

## 2024-09-11 ENCOUNTER — ANESTHESIA (OUTPATIENT)
Dept: OPERATING ROOM | Facility: HOSPITAL | Age: 64
End: 2024-09-11
Payer: COMMERCIAL

## 2024-09-11 ENCOUNTER — HOSPITAL ENCOUNTER (INPATIENT)
Facility: HOSPITAL | Age: 64
DRG: 330 | End: 2024-09-11
Attending: SURGERY | Admitting: SURGERY
Payer: COMMERCIAL

## 2024-09-11 DIAGNOSIS — D37.2: ICD-10-CM

## 2024-09-11 DIAGNOSIS — C18.4 MALIGNANT NEOPLASM OF TRANSVERSE COLON (MULTI): ICD-10-CM

## 2024-09-11 DIAGNOSIS — C18.9 COLON CANCER (MULTI): Primary | ICD-10-CM

## 2024-09-11 DIAGNOSIS — Z74.09 MOBILITY IMPAIRED: ICD-10-CM

## 2024-09-11 LAB
ABO GROUP (TYPE) IN BLOOD: NORMAL
RH FACTOR (ANTIGEN D): NORMAL

## 2024-09-11 PROCEDURE — 7100000001 HC RECOVERY ROOM TIME - INITIAL BASE CHARGE: Performed by: SURGERY

## 2024-09-11 PROCEDURE — 1100000001 HC PRIVATE ROOM DAILY

## 2024-09-11 PROCEDURE — 88313 SPECIAL STAINS GROUP 2: CPT | Performed by: PATHOLOGY

## 2024-09-11 PROCEDURE — 2500000004 HC RX 250 GENERAL PHARMACY W/ HCPCS (ALT 636 FOR OP/ED)

## 2024-09-11 PROCEDURE — 2500000001 HC RX 250 WO HCPCS SELF ADMINISTERED DRUGS (ALT 637 FOR MEDICARE OP): Performed by: SURGERY

## 2024-09-11 PROCEDURE — 2500000004 HC RX 250 GENERAL PHARMACY W/ HCPCS (ALT 636 FOR OP/ED): Mod: JZ | Performed by: SURGERY

## 2024-09-11 PROCEDURE — 2500000004 HC RX 250 GENERAL PHARMACY W/ HCPCS (ALT 636 FOR OP/ED): Performed by: SURGERY

## 2024-09-11 PROCEDURE — 3600000009 HC OR TIME - EACH INCREMENTAL 1 MINUTE - PROCEDURE LEVEL FOUR: Performed by: SURGERY

## 2024-09-11 PROCEDURE — 2500000005 HC RX 250 GENERAL PHARMACY W/O HCPCS

## 2024-09-11 PROCEDURE — 44204 LAPARO PARTIAL COLECTOMY: CPT | Performed by: SURGERY

## 2024-09-11 PROCEDURE — 9420000001 HC RT PATIENT EDUCATION 5 MIN

## 2024-09-11 PROCEDURE — 7100000002 HC RECOVERY ROOM TIME - EACH INCREMENTAL 1 MINUTE: Performed by: SURGERY

## 2024-09-11 PROCEDURE — 2720000007 HC OR 272 NO HCPCS: Performed by: SURGERY

## 2024-09-11 PROCEDURE — 2500000004 HC RX 250 GENERAL PHARMACY W/ HCPCS (ALT 636 FOR OP/ED): Performed by: ANESTHESIOLOGY

## 2024-09-11 PROCEDURE — 2500000005 HC RX 250 GENERAL PHARMACY W/O HCPCS: Performed by: ANESTHESIOLOGY

## 2024-09-11 PROCEDURE — 3700000001 HC GENERAL ANESTHESIA TIME - INITIAL BASE CHARGE: Performed by: SURGERY

## 2024-09-11 PROCEDURE — 88309 TISSUE EXAM BY PATHOLOGIST: CPT | Mod: TC,AHULAB,WESLAB | Performed by: SURGERY

## 2024-09-11 PROCEDURE — 88309 TISSUE EXAM BY PATHOLOGIST: CPT | Performed by: PATHOLOGY

## 2024-09-11 PROCEDURE — 3600000004 HC OR TIME - INITIAL BASE CHARGE - PROCEDURE LEVEL FOUR: Performed by: SURGERY

## 2024-09-11 PROCEDURE — 0DTF4ZZ RESECTION OF RIGHT LARGE INTESTINE, PERCUTANEOUS ENDOSCOPIC APPROACH: ICD-10-PCS | Performed by: SURGERY

## 2024-09-11 PROCEDURE — 3700000002 HC GENERAL ANESTHESIA TIME - EACH INCREMENTAL 1 MINUTE: Performed by: SURGERY

## 2024-09-11 PROCEDURE — 36415 COLL VENOUS BLD VENIPUNCTURE: CPT | Performed by: SURGERY

## 2024-09-11 PROCEDURE — 2500000005 HC RX 250 GENERAL PHARMACY W/O HCPCS: Performed by: SURGERY

## 2024-09-11 RX ORDER — LIDOCAINE HYDROCHLORIDE 10 MG/ML
0.1 INJECTION, SOLUTION EPIDURAL; INFILTRATION; INTRACAUDAL; PERINEURAL ONCE
Status: DISCONTINUED | OUTPATIENT
Start: 2024-09-11 | End: 2024-09-11

## 2024-09-11 RX ORDER — PANTOPRAZOLE SODIUM 40 MG/10ML
20 INJECTION, POWDER, LYOPHILIZED, FOR SOLUTION INTRAVENOUS
Status: DISCONTINUED | OUTPATIENT
Start: 2024-09-11 | End: 2024-09-16 | Stop reason: HOSPADM

## 2024-09-11 RX ORDER — OXYCODONE HYDROCHLORIDE 5 MG/1
5 TABLET ORAL EVERY 4 HOURS PRN
Status: DISCONTINUED | OUTPATIENT
Start: 2024-09-11 | End: 2024-09-11

## 2024-09-11 RX ORDER — ROCURONIUM BROMIDE 10 MG/ML
INJECTION, SOLUTION INTRAVENOUS AS NEEDED
Status: DISCONTINUED | OUTPATIENT
Start: 2024-09-11 | End: 2024-09-11

## 2024-09-11 RX ORDER — OXYCODONE HYDROCHLORIDE 5 MG/1
5 TABLET ORAL EVERY 4 HOURS PRN
Status: DISCONTINUED | OUTPATIENT
Start: 2024-09-11 | End: 2024-09-16 | Stop reason: HOSPADM

## 2024-09-11 RX ORDER — ENOXAPARIN SODIUM 100 MG/ML
40 INJECTION SUBCUTANEOUS EVERY 24 HOURS
Status: DISCONTINUED | OUTPATIENT
Start: 2024-09-11 | End: 2024-09-16 | Stop reason: HOSPADM

## 2024-09-11 RX ORDER — PROMETHAZINE HYDROCHLORIDE 25 MG/1
25 SUPPOSITORY RECTAL EVERY 12 HOURS PRN
Status: DISCONTINUED | OUTPATIENT
Start: 2024-09-11 | End: 2024-09-16 | Stop reason: HOSPADM

## 2024-09-11 RX ORDER — VENLAFAXINE HYDROCHLORIDE 37.5 MG/1
37.5 CAPSULE, EXTENDED RELEASE ORAL DAILY
Status: DISCONTINUED | OUTPATIENT
Start: 2024-09-11 | End: 2024-09-16 | Stop reason: HOSPADM

## 2024-09-11 RX ORDER — PROMETHAZINE HYDROCHLORIDE 25 MG/1
25 TABLET ORAL EVERY 6 HOURS PRN
Status: DISCONTINUED | OUTPATIENT
Start: 2024-09-11 | End: 2024-09-16 | Stop reason: HOSPADM

## 2024-09-11 RX ORDER — METRONIDAZOLE 500 MG/100ML
500 INJECTION, SOLUTION INTRAVENOUS ONCE
Status: COMPLETED | OUTPATIENT
Start: 2024-09-11 | End: 2024-09-11

## 2024-09-11 RX ORDER — HEPARIN SODIUM 5000 [USP'U]/ML
5000 INJECTION, SOLUTION INTRAVENOUS; SUBCUTANEOUS ONCE
Status: COMPLETED | OUTPATIENT
Start: 2024-09-11 | End: 2024-09-11

## 2024-09-11 RX ORDER — NALOXONE HYDROCHLORIDE 1 MG/ML
0.2 INJECTION INTRAMUSCULAR; INTRAVENOUS; SUBCUTANEOUS EVERY 5 MIN PRN
Status: DISCONTINUED | OUTPATIENT
Start: 2024-09-11 | End: 2024-09-16 | Stop reason: HOSPADM

## 2024-09-11 RX ORDER — SODIUM CHLORIDE, SODIUM LACTATE, POTASSIUM CHLORIDE, CALCIUM CHLORIDE 600; 310; 30; 20 MG/100ML; MG/100ML; MG/100ML; MG/100ML
20 INJECTION, SOLUTION INTRAVENOUS CONTINUOUS
Status: DISCONTINUED | OUTPATIENT
Start: 2024-09-11 | End: 2024-09-12

## 2024-09-11 RX ORDER — LEVOFLOXACIN 5 MG/ML
500 INJECTION, SOLUTION INTRAVENOUS ONCE
Status: DISCONTINUED | OUTPATIENT
Start: 2024-09-11 | End: 2024-09-11

## 2024-09-11 RX ORDER — TALC
5 POWDER (GRAM) TOPICAL NIGHTLY PRN
Status: DISCONTINUED | OUTPATIENT
Start: 2024-09-11 | End: 2024-09-16 | Stop reason: HOSPADM

## 2024-09-11 RX ORDER — OXYCODONE HYDROCHLORIDE 5 MG/1
10 TABLET ORAL EVERY 4 HOURS PRN
Status: DISCONTINUED | OUTPATIENT
Start: 2024-09-11 | End: 2024-09-16 | Stop reason: HOSPADM

## 2024-09-11 RX ORDER — ONDANSETRON HYDROCHLORIDE 2 MG/ML
INJECTION, SOLUTION INTRAVENOUS AS NEEDED
Status: DISCONTINUED | OUTPATIENT
Start: 2024-09-11 | End: 2024-09-11

## 2024-09-11 RX ORDER — CEFAZOLIN 1 G/1
INJECTION, POWDER, FOR SOLUTION INTRAVENOUS AS NEEDED
Status: DISCONTINUED | OUTPATIENT
Start: 2024-09-11 | End: 2024-09-11

## 2024-09-11 RX ORDER — HYDROMORPHONE HYDROCHLORIDE 1 MG/ML
INJECTION, SOLUTION INTRAMUSCULAR; INTRAVENOUS; SUBCUTANEOUS AS NEEDED
Status: DISCONTINUED | OUTPATIENT
Start: 2024-09-11 | End: 2024-09-11

## 2024-09-11 RX ORDER — LIDOCAINE HYDROCHLORIDE 20 MG/ML
INJECTION, SOLUTION EPIDURAL; INFILTRATION; INTRACAUDAL; PERINEURAL AS NEEDED
Status: DISCONTINUED | OUTPATIENT
Start: 2024-09-11 | End: 2024-09-11

## 2024-09-11 RX ORDER — ONDANSETRON HYDROCHLORIDE 2 MG/ML
4 INJECTION, SOLUTION INTRAVENOUS ONCE AS NEEDED
Status: DISCONTINUED | OUTPATIENT
Start: 2024-09-11 | End: 2024-09-11

## 2024-09-11 RX ORDER — GABAPENTIN 300 MG/1
300 CAPSULE ORAL 3 TIMES DAILY
Status: DISCONTINUED | OUTPATIENT
Start: 2024-09-11 | End: 2024-09-16 | Stop reason: HOSPADM

## 2024-09-11 RX ORDER — PROPOFOL 10 MG/ML
INJECTION, EMULSION INTRAVENOUS AS NEEDED
Status: DISCONTINUED | OUTPATIENT
Start: 2024-09-11 | End: 2024-09-11

## 2024-09-11 RX ORDER — SODIUM CHLORIDE 0.9 G/100ML
IRRIGANT IRRIGATION AS NEEDED
Status: DISCONTINUED | OUTPATIENT
Start: 2024-09-11 | End: 2024-09-11 | Stop reason: HOSPADM

## 2024-09-11 RX ORDER — METOPROLOL TARTRATE 25 MG/1
12.5 TABLET, FILM COATED ORAL 2 TIMES DAILY
Status: DISCONTINUED | OUTPATIENT
Start: 2024-09-11 | End: 2024-09-16 | Stop reason: HOSPADM

## 2024-09-11 RX ORDER — HYDROMORPHONE HYDROCHLORIDE 0.2 MG/ML
0.2 INJECTION INTRAMUSCULAR; INTRAVENOUS; SUBCUTANEOUS EVERY 2 HOUR PRN
Status: DISCONTINUED | OUTPATIENT
Start: 2024-09-11 | End: 2024-09-16 | Stop reason: HOSPADM

## 2024-09-11 RX ORDER — ACETAMINOPHEN 325 MG/1
650 TABLET ORAL EVERY 4 HOURS PRN
Status: DISCONTINUED | OUTPATIENT
Start: 2024-09-11 | End: 2024-09-12

## 2024-09-11 RX ORDER — SODIUM CHLORIDE 9 MG/ML
40 INJECTION, SOLUTION INTRAVENOUS CONTINUOUS
Status: DISCONTINUED | OUTPATIENT
Start: 2024-09-11 | End: 2024-09-14

## 2024-09-11 RX ORDER — FENTANYL CITRATE 50 UG/ML
INJECTION, SOLUTION INTRAMUSCULAR; INTRAVENOUS AS NEEDED
Status: DISCONTINUED | OUTPATIENT
Start: 2024-09-11 | End: 2024-09-11

## 2024-09-11 RX ORDER — SODIUM CHLORIDE, SODIUM LACTATE, POTASSIUM CHLORIDE, CALCIUM CHLORIDE 600; 310; 30; 20 MG/100ML; MG/100ML; MG/100ML; MG/100ML
100 INJECTION, SOLUTION INTRAVENOUS CONTINUOUS
Status: DISCONTINUED | OUTPATIENT
Start: 2024-09-11 | End: 2024-09-11 | Stop reason: HOSPADM

## 2024-09-11 RX ORDER — TRAZODONE HYDROCHLORIDE 50 MG/1
100 TABLET ORAL DAILY
Status: DISCONTINUED | OUTPATIENT
Start: 2024-09-11 | End: 2024-09-16 | Stop reason: HOSPADM

## 2024-09-11 RX ORDER — MIDAZOLAM HYDROCHLORIDE 1 MG/ML
INJECTION INTRAMUSCULAR; INTRAVENOUS AS NEEDED
Status: DISCONTINUED | OUTPATIENT
Start: 2024-09-11 | End: 2024-09-11

## 2024-09-11 SDOH — SOCIAL STABILITY: SOCIAL INSECURITY: ABUSE: ADULT

## 2024-09-11 SDOH — SOCIAL STABILITY: SOCIAL INSECURITY: HAVE YOU HAD ANY THOUGHTS OF HARMING ANYONE ELSE?: NO

## 2024-09-11 SDOH — SOCIAL STABILITY: SOCIAL INSECURITY: DOES ANYONE TRY TO KEEP YOU FROM HAVING/CONTACTING OTHER FRIENDS OR DOING THINGS OUTSIDE YOUR HOME?: NO

## 2024-09-11 SDOH — SOCIAL STABILITY: SOCIAL INSECURITY: DO YOU FEEL ANYONE HAS EXPLOITED OR TAKEN ADVANTAGE OF YOU FINANCIALLY OR OF YOUR PERSONAL PROPERTY?: NO

## 2024-09-11 SDOH — SOCIAL STABILITY: SOCIAL INSECURITY: DO YOU FEEL UNSAFE GOING BACK TO THE PLACE WHERE YOU ARE LIVING?: NO

## 2024-09-11 SDOH — HEALTH STABILITY: MENTAL HEALTH: CURRENT SMOKER: 0

## 2024-09-11 SDOH — SOCIAL STABILITY: SOCIAL INSECURITY: HAS ANYONE EVER THREATENED TO HURT YOUR FAMILY OR YOUR PETS?: NO

## 2024-09-11 SDOH — SOCIAL STABILITY: SOCIAL INSECURITY: ARE THERE ANY APPARENT SIGNS OF INJURIES/BEHAVIORS THAT COULD BE RELATED TO ABUSE/NEGLECT?: NO

## 2024-09-11 SDOH — SOCIAL STABILITY: SOCIAL INSECURITY: HAVE YOU HAD THOUGHTS OF HARMING ANYONE ELSE?: NO

## 2024-09-11 SDOH — SOCIAL STABILITY: SOCIAL INSECURITY: ARE YOU OR HAVE YOU BEEN THREATENED OR ABUSED PHYSICALLY, EMOTIONALLY, OR SEXUALLY BY ANYONE?: NO

## 2024-09-11 SDOH — SOCIAL STABILITY: SOCIAL INSECURITY: WERE YOU ABLE TO COMPLETE ALL THE BEHAVIORAL HEALTH SCREENINGS?: YES

## 2024-09-11 ASSESSMENT — LIFESTYLE VARIABLES
HOW OFTEN DO YOU HAVE A DRINK CONTAINING ALCOHOL: NEVER
SKIP TO QUESTIONS 9-10: 1
AUDIT-C TOTAL SCORE: 0
AUDIT-C TOTAL SCORE: 0
HOW OFTEN DO YOU HAVE 6 OR MORE DRINKS ON ONE OCCASION: NEVER
HOW MANY STANDARD DRINKS CONTAINING ALCOHOL DO YOU HAVE ON A TYPICAL DAY: PATIENT DOES NOT DRINK

## 2024-09-11 ASSESSMENT — COGNITIVE AND FUNCTIONAL STATUS - GENERAL
TURNING FROM BACK TO SIDE WHILE IN FLAT BAD: A LITTLE
CLIMB 3 TO 5 STEPS WITH RAILING: A LITTLE
MOBILITY SCORE: 18
STANDING UP FROM CHAIR USING ARMS: A LITTLE
MOVING FROM LYING ON BACK TO SITTING ON SIDE OF FLAT BED WITH BEDRAILS: A LITTLE
WALKING IN HOSPITAL ROOM: A LITTLE
DRESSING REGULAR LOWER BODY CLOTHING: A LITTLE
PATIENT BASELINE BEDBOUND: NO
MOVING TO AND FROM BED TO CHAIR: A LITTLE
DAILY ACTIVITIY SCORE: 23

## 2024-09-11 ASSESSMENT — PATIENT HEALTH QUESTIONNAIRE - PHQ9
1. LITTLE INTEREST OR PLEASURE IN DOING THINGS: NOT AT ALL
2. FEELING DOWN, DEPRESSED OR HOPELESS: NOT AT ALL
SUM OF ALL RESPONSES TO PHQ9 QUESTIONS 1 & 2: 0

## 2024-09-11 ASSESSMENT — PAIN SCALES - GENERAL
PAINLEVEL_OUTOF10: 9
PAINLEVEL_OUTOF10: 2
PAINLEVEL_OUTOF10: 4
PAINLEVEL_OUTOF10: 5 - MODERATE PAIN
PAINLEVEL_OUTOF10: 5 - MODERATE PAIN
PAINLEVEL_OUTOF10: 7

## 2024-09-11 ASSESSMENT — ACTIVITIES OF DAILY LIVING (ADL)
LACK_OF_TRANSPORTATION: NO
HEARING - RIGHT EAR: FUNCTIONAL
TOILETING: NEEDS ASSISTANCE
PATIENT'S MEMORY ADEQUATE TO SAFELY COMPLETE DAILY ACTIVITIES?: YES
FEEDING YOURSELF: INDEPENDENT
DRESSING YOURSELF: INDEPENDENT
JUDGMENT_ADEQUATE_SAFELY_COMPLETE_DAILY_ACTIVITIES: YES
ADEQUATE_TO_COMPLETE_ADL: YES
WALKS IN HOME: NEEDS ASSISTANCE
BATHING: INDEPENDENT
HEARING - LEFT EAR: FUNCTIONAL
GROOMING: INDEPENDENT

## 2024-09-11 ASSESSMENT — PAIN - FUNCTIONAL ASSESSMENT
PAIN_FUNCTIONAL_ASSESSMENT: 0-10

## 2024-09-11 ASSESSMENT — COLUMBIA-SUICIDE SEVERITY RATING SCALE - C-SSRS
2. HAVE YOU ACTUALLY HAD ANY THOUGHTS OF KILLING YOURSELF?: NO
1. IN THE PAST MONTH, HAVE YOU WISHED YOU WERE DEAD OR WISHED YOU COULD GO TO SLEEP AND NOT WAKE UP?: NO
6. HAVE YOU EVER DONE ANYTHING, STARTED TO DO ANYTHING, OR PREPARED TO DO ANYTHING TO END YOUR LIFE?: NO

## 2024-09-11 ASSESSMENT — PAIN DESCRIPTION - ORIENTATION: ORIENTATION: RIGHT

## 2024-09-11 ASSESSMENT — PAIN DESCRIPTION - LOCATION: LOCATION: ABDOMEN

## 2024-09-11 NOTE — ANESTHESIA POSTPROCEDURE EVALUATION
Patient: Maribel Lakhani    Procedure Summary       Date: 09/11/24 Room / Location: [facility] / [facility]    Anesthesia Start: 1454 Anesthesia Stop: 1717    Procedure: Laparoscopic Extended Right Colectomy; Ileocolic Anastomosis Diagnosis:       Malignant neoplasm of transverse colon (Multi)      (Malignant neoplasm of transverse colon (Multi) [C18.4])    Surgeons: Yann Cotter MD Responsible Provider: Taqueria Dailey MD    Anesthesia Type: general ASA Status: 3            Anesthesia Type: general    Vitals Value Taken Time   /98 09/11/24 1716   Temp 36.8 °C (98.2 °F) 09/11/24 1715   Pulse 84 09/11/24 1721   Resp 16 09/11/24 1715   SpO2 97 % 09/11/24 1721   Vitals shown include unfiled device data.    Anesthesia Post Evaluation    Patient location during evaluation: bedside  Patient participation: complete - patient participated  Level of consciousness: awake  Pain management: adequate  Airway patency: patent  Cardiovascular status: acceptable  Respiratory status: acceptable  Hydration status: acceptable  Postoperative Nausea and Vomiting: none        No notable events documented.

## 2024-09-11 NOTE — ANESTHESIA PROCEDURE NOTES
Airway  Date/Time: 9/11/2024 3:11 PM  Urgency: elective    Airway not difficult    Staffing  Performed: LUIS   Authorized by: Devonte Moreland MD    Performed by: LUIS Porter  Patient location during procedure: OR    Indications and Patient Condition  Indications for airway management: anesthesia  Spontaneous Ventilation: absent  Sedation level: deep  Preoxygenated: yes  Patient position: sniffing  Mask difficulty assessment: 1 - vent by mask  No planned trial extubation    Final Airway Details  Final airway type: endotracheal airway      Successful airway: ETT  Cuffed: yes   Successful intubation technique: direct laryngoscopy  Facilitating devices/methods: intubating stylet  Endotracheal tube insertion site: oral  Blade: Elsie  Blade size: #3  ETT size (mm): 7.0  Cormack-Lehane Classification: grade I - full view of glottis  Placement verified by: chest auscultation and capnometry   Cuff volume (mL): 7  Measured from: lips  ETT to lips (cm): 22  Number of attempts at approach: 1

## 2024-09-11 NOTE — ANESTHESIA PREPROCEDURE EVALUATION
Patient: Maribel Lakhani    Procedure Information       Date/Time: 09/11/24 1130    Procedure: Laparoscopic Extended Right Colectomy; Ileocolic Anastomosis    Location: [facility] / [facility]    Surgeons: Yann Cotter MD            Relevant Problems   Cardiac   (+) High cholesterol      Neuro   (+) Anxiety   (+) Current moderate episode of major depressive disorder without prior episode (Multi)      GI   (+) Colon cancer (Multi)   (+) Malignant neoplasm of transverse colon (Multi)      Liver   (+) Colon cancer (Multi)   (+) Malignant neoplasm of transverse colon (Multi)      Musculoskeletal   (+) Lumbar spondylosis       Clinical information reviewed:   Tobacco  Allergies  Meds   Med Hx  Surg Hx   Fam Hx  Soc Hx        NPO Detail:  NPO/Void Status  Carbohydrate Drink Given Prior to Surgery? : Y  Date of Last Liquid: 09/11/24  Time of Last Liquid: 0800  Date of Last Solid: 09/09/24  Time of Last Solid: 2000  Last Intake Type: Clear fluids  Time of Last Void: 1022         Physical Exam    Airway  Mallampati: I  TM distance: >3 FB  Neck ROM: full     Cardiovascular - normal exam     Dental - normal exam     Pulmonary - normal exam     Abdominal - normal exam           Anesthesia Plan    History of general anesthesia?: yes  History of complications of general anesthesia?: no    ASA 3     general     The patient is not a current smoker.  Patient was not previously instructed to abstain from smoking on day of procedure.  Patient did not smoke on day of procedure.    intravenous induction   Postoperative administration of opioids is intended.  Anesthetic plan and risks discussed with patient.  Use of blood products discussed with patient who.    Plan discussed with [facility].

## 2024-09-11 NOTE — ANESTHESIA PROCEDURE NOTES
Peripheral IV  Date/Time: 9/11/2024 3:30 PM      Placement  Needle size: 18 G  Laterality: right  Location: wrist  Local anesthetic: none  Site prep: alcohol  Technique: anatomical landmarks  Attempts: 1

## 2024-09-12 ENCOUNTER — APPOINTMENT (OUTPATIENT)
Dept: CARDIOLOGY | Facility: HOSPITAL | Age: 64
DRG: 330 | End: 2024-09-12
Payer: COMMERCIAL

## 2024-09-12 LAB
ANION GAP SERPL CALC-SCNC: 12 MMOL/L (ref 10–20)
BUN SERPL-MCNC: 8 MG/DL (ref 6–23)
CALCIUM SERPL-MCNC: 8.9 MG/DL (ref 8.6–10.3)
CHLORIDE SERPL-SCNC: 106 MMOL/L (ref 98–107)
CO2 SERPL-SCNC: 24 MMOL/L (ref 21–32)
CREAT SERPL-MCNC: 0.53 MG/DL (ref 0.5–1.05)
EGFRCR SERPLBLD CKD-EPI 2021: >90 ML/MIN/1.73M*2
ERYTHROCYTE [DISTWIDTH] IN BLOOD BY AUTOMATED COUNT: 13.2 % (ref 11.5–14.5)
GLUCOSE SERPL-MCNC: 99 MG/DL (ref 74–99)
HCT VFR BLD AUTO: 39.3 % (ref 36–46)
HGB BLD-MCNC: 13 G/DL (ref 12–16)
MCH RBC QN AUTO: 31.5 PG (ref 26–34)
MCHC RBC AUTO-ENTMCNC: 33.1 G/DL (ref 32–36)
MCV RBC AUTO: 95 FL (ref 80–100)
NRBC BLD-RTO: 0 /100 WBCS (ref 0–0)
PLATELET # BLD AUTO: 223 X10*3/UL (ref 150–450)
POTASSIUM SERPL-SCNC: 4.4 MMOL/L (ref 3.5–5.3)
RBC # BLD AUTO: 4.13 X10*6/UL (ref 4–5.2)
SODIUM SERPL-SCNC: 138 MMOL/L (ref 136–145)
WBC # BLD AUTO: 7.8 X10*3/UL (ref 4.4–11.3)

## 2024-09-12 PROCEDURE — 97161 PT EVAL LOW COMPLEX 20 MIN: CPT | Mod: GP

## 2024-09-12 PROCEDURE — 97535 SELF CARE MNGMENT TRAINING: CPT | Mod: GO

## 2024-09-12 PROCEDURE — 36415 COLL VENOUS BLD VENIPUNCTURE: CPT | Performed by: SURGERY

## 2024-09-12 PROCEDURE — 99232 SBSQ HOSP IP/OBS MODERATE 35: CPT

## 2024-09-12 PROCEDURE — 97165 OT EVAL LOW COMPLEX 30 MIN: CPT | Mod: GO

## 2024-09-12 PROCEDURE — 93005 ELECTROCARDIOGRAM TRACING: CPT

## 2024-09-12 PROCEDURE — 85027 COMPLETE CBC AUTOMATED: CPT | Performed by: SURGERY

## 2024-09-12 PROCEDURE — 2500000004 HC RX 250 GENERAL PHARMACY W/ HCPCS (ALT 636 FOR OP/ED): Performed by: SURGERY

## 2024-09-12 PROCEDURE — 1100000001 HC PRIVATE ROOM DAILY

## 2024-09-12 PROCEDURE — 2500000001 HC RX 250 WO HCPCS SELF ADMINISTERED DRUGS (ALT 637 FOR MEDICARE OP): Performed by: SURGERY

## 2024-09-12 PROCEDURE — 9420000001 HC RT PATIENT EDUCATION 5 MIN

## 2024-09-12 PROCEDURE — 2500000001 HC RX 250 WO HCPCS SELF ADMINISTERED DRUGS (ALT 637 FOR MEDICARE OP)

## 2024-09-12 PROCEDURE — 80048 BASIC METABOLIC PNL TOTAL CA: CPT | Performed by: SURGERY

## 2024-09-12 RX ORDER — METHOCARBAMOL 500 MG/1
500 TABLET, FILM COATED ORAL EVERY 6 HOURS SCHEDULED
Status: DISCONTINUED | OUTPATIENT
Start: 2024-09-12 | End: 2024-09-13

## 2024-09-12 RX ORDER — ACETAMINOPHEN 325 MG/1
975 TABLET ORAL EVERY 6 HOURS
Status: DISCONTINUED | OUTPATIENT
Start: 2024-09-12 | End: 2024-09-16 | Stop reason: HOSPADM

## 2024-09-12 ASSESSMENT — COGNITIVE AND FUNCTIONAL STATUS - GENERAL
STANDING UP FROM CHAIR USING ARMS: A LITTLE
DRESSING REGULAR LOWER BODY CLOTHING: A LOT
CLIMB 3 TO 5 STEPS WITH RAILING: A LITTLE
MOVING TO AND FROM BED TO CHAIR: A LITTLE
MOVING TO AND FROM BED TO CHAIR: A LITTLE
WALKING IN HOSPITAL ROOM: A LITTLE
DRESSING REGULAR LOWER BODY CLOTHING: A LITTLE
MOVING FROM LYING ON BACK TO SITTING ON SIDE OF FLAT BED WITH BEDRAILS: A LITTLE
DRESSING REGULAR LOWER BODY CLOTHING: A LITTLE
WALKING IN HOSPITAL ROOM: A LITTLE
MOVING FROM LYING ON BACK TO SITTING ON SIDE OF FLAT BED WITH BEDRAILS: A LITTLE
DRESSING REGULAR LOWER BODY CLOTHING: A LITTLE
CLIMB 3 TO 5 STEPS WITH RAILING: A LITTLE
DAILY ACTIVITIY SCORE: 23
MOBILITY SCORE: 18
MOBILITY SCORE: 18
MOVING TO AND FROM BED TO CHAIR: A LITTLE
STANDING UP FROM CHAIR USING ARMS: A LITTLE
CLIMB 3 TO 5 STEPS WITH RAILING: A LITTLE
CLIMB 3 TO 5 STEPS WITH RAILING: A LITTLE
TURNING FROM BACK TO SIDE WHILE IN FLAT BAD: A LITTLE
DRESSING REGULAR LOWER BODY CLOTHING: A LITTLE
MOVING FROM LYING ON BACK TO SITTING ON SIDE OF FLAT BED WITH BEDRAILS: A LITTLE
STANDING UP FROM CHAIR USING ARMS: A LITTLE
MOVING FROM LYING ON BACK TO SITTING ON SIDE OF FLAT BED WITH BEDRAILS: A LITTLE
WALKING IN HOSPITAL ROOM: A LITTLE
MOBILITY SCORE: 18
MOBILITY SCORE: 17
DAILY ACTIVITIY SCORE: 23
TURNING FROM BACK TO SIDE WHILE IN FLAT BAD: A LITTLE
STANDING UP FROM CHAIR USING ARMS: A LITTLE
TOILETING: A LITTLE
DAILY ACTIVITIY SCORE: 23
WALKING IN HOSPITAL ROOM: A LITTLE
TURNING FROM BACK TO SIDE WHILE IN FLAT BAD: A LITTLE
STANDING UP FROM CHAIR USING ARMS: A LITTLE
TURNING FROM BACK TO SIDE WHILE IN FLAT BAD: A LITTLE
CLIMB 3 TO 5 STEPS WITH RAILING: A LITTLE
MOVING TO AND FROM BED TO CHAIR: A LITTLE
DRESSING REGULAR LOWER BODY CLOTHING: A LITTLE
DAILY ACTIVITIY SCORE: 18
WALKING IN HOSPITAL ROOM: A LITTLE
DRESSING REGULAR UPPER BODY CLOTHING: A LITTLE
MOVING TO AND FROM BED TO CHAIR: A LITTLE
MOVING TO AND FROM BED TO CHAIR: A LITTLE
TURNING FROM BACK TO SIDE WHILE IN FLAT BAD: A LITTLE
WALKING IN HOSPITAL ROOM: A LITTLE
HELP NEEDED FOR BATHING: A LOT
MOBILITY SCORE: 18
MOBILITY SCORE: 18
MOVING FROM LYING ON BACK TO SITTING ON SIDE OF FLAT BED WITH BEDRAILS: A LITTLE
WALKING IN HOSPITAL ROOM: A LITTLE
MOVING FROM LYING ON BACK TO SITTING ON SIDE OF FLAT BED WITH BEDRAILS: A LITTLE
MOBILITY SCORE: 18
CLIMB 3 TO 5 STEPS WITH RAILING: A LITTLE
DAILY ACTIVITIY SCORE: 23
MOVING TO AND FROM BED TO CHAIR: A LITTLE
MOVING FROM LYING ON BACK TO SITTING ON SIDE OF FLAT BED WITH BEDRAILS: A LITTLE
CLIMB 3 TO 5 STEPS WITH RAILING: A LITTLE
DAILY ACTIVITIY SCORE: 23
MOVING TO AND FROM BED TO CHAIR: A LITTLE
TURNING FROM BACK TO SIDE WHILE IN FLAT BAD: A LITTLE
DRESSING REGULAR LOWER BODY CLOTHING: A LITTLE
DAILY ACTIVITIY SCORE: 23
MOBILITY SCORE: 18
STANDING UP FROM CHAIR USING ARMS: A LITTLE
DAILY ACTIVITIY SCORE: 23
TURNING FROM BACK TO SIDE WHILE IN FLAT BAD: A LITTLE
CLIMB 3 TO 5 STEPS WITH RAILING: A LOT
MOVING FROM LYING ON BACK TO SITTING ON SIDE OF FLAT BED WITH BEDRAILS: A LITTLE
TURNING FROM BACK TO SIDE WHILE IN FLAT BAD: A LITTLE
DRESSING REGULAR LOWER BODY CLOTHING: A LITTLE
WALKING IN HOSPITAL ROOM: A LITTLE

## 2024-09-12 ASSESSMENT — PAIN DESCRIPTION - LOCATION
LOCATION: ABDOMEN

## 2024-09-12 ASSESSMENT — PAIN SCALES - WONG BAKER: WONGBAKER_NUMERICALRESPONSE: HURTS EVEN MORE

## 2024-09-12 ASSESSMENT — PAIN - FUNCTIONAL ASSESSMENT
PAIN_FUNCTIONAL_ASSESSMENT: 0-10

## 2024-09-12 ASSESSMENT — ACTIVITIES OF DAILY LIVING (ADL)
ADL_ASSISTANCE: INDEPENDENT
ADL_ASSISTANCE: INDEPENDENT
HOME_MANAGEMENT_TIME_ENTRY: 15
LACK_OF_TRANSPORTATION: NO

## 2024-09-12 ASSESSMENT — PAIN SCALES - GENERAL
PAINLEVEL_OUTOF10: 7
PAINLEVEL_OUTOF10: 4
PAINLEVEL_OUTOF10: 0 - NO PAIN
PAINLEVEL_OUTOF10: 8
PAINLEVEL_OUTOF10: 6
PAINLEVEL_OUTOF10: 2
PAINLEVEL_OUTOF10: 8
PAINLEVEL_OUTOF10: 6
PAINLEVEL_OUTOF10: 0 - NO PAIN

## 2024-09-12 ASSESSMENT — PAIN DESCRIPTION - ORIENTATION
ORIENTATION: RIGHT
ORIENTATION: RIGHT

## 2024-09-12 NOTE — PROGRESS NOTES
Physical Therapy    Physical Therapy Evaluation    Patient Name: Maribel Lakhani  MRN: [id number]  Department: [place] [id number]  Room: [facility]  Today's Date: 9/12/2024   Time Calculation  Start Time: 0959  Stop Time: 1018  Time Calculation (min): 19 min    Assessment/Plan   PT Assessment  PT Assessment Results: Decreased strength, Impaired balance, Decreased mobility, Pain  Rehab Prognosis: Good  Barriers to Discharge: Pending stair training with steps to access home  Evaluation/Treatment Tolerance: Patient tolerated treatment well  Medical Staff Made Aware: Yes  Strengths: Ability to acquire knowledge, Premorbid level of function, Support and attitude of living partners  Barriers to Participation:  (None identified)  End of Session Communication: Bedside nurse  Assessment Comment: Pt presents today with decreased BLE strength, fair balance, and fair activity tolerance. Pt would benefit from continued PT to address/improve the above factors closer to the reported PLOF. Pt would also benefit from stair training to maximize independence with functional mobility at D/C. At D/C, pt would benefit from low intensity therapy.  End of Session Patient Position: Up in chair, Alarm on  IP OR SWING BED PT PLAN  Inpatient or Swing Bed: Inpatient  PT Plan  Treatment/Interventions: Bed mobility, Transfer training, Gait training, Stair training, Balance training, Strengthening, Therapeutic exercise, Therapeutic activity, Home exercise program, Postural re-education  PT Plan: Ongoing PT  PT Frequency: 4 times per week  PT Discharge Recommendations: Low intensity level of continued care  Equipment Recommended upon Discharge: Wheeled walker  PT Recommended Transfer Status: Contact guard, Assistive device  PT - OK to Discharge: Yes (PT POC intiated today)    Subjective   General Visit Information:  General  Reason for Referral: 65 y/o F s/p laparoscopic extended colectomy; ileocolic anastomosis on 9/11/24  Referred By: TERESO Cotter  Past Medical  History Relevant to Rehab:   Past Medical History:   Diagnosis Date    Anxiety and depression     Cardiology follow-up encounter 08/19/2024    Dianne Coppola MD    Encounter for pre-operative cardiovascular clearance 08/19/2024    Dianne Coppola MD- She can undergo colon surgery if needed to be done, she is intermediate risk. I advised to wait 30 days from the day of surgery    H/O transesophageal echocardiography (FABIOLA) for monitoring 08/02/2024    CONCLUSIONS:  1. The left ventricular systolic function is normal, with a visually estimated ejection fraction of 65%.  2. Left ventricular diastolic filling was indeterminate.  3. Left ventricular cavity size is decreased.  4. There is normal right ventricular global systolic function.  5. Aortic valve stenosis is not present.  6. Mild aortic valve regurgitation.    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 Cleared to participate with RN, and agreeable to PT evaluation.  Home Living:  Home Living  Type of Home: House  Lives With: Spouse  Home Layout: Two level, Able to live on main level with bedroom/bathroom  Home Access: Stairs to enter with rails  Entrance Stairs-Rails: Left  Entrance Stairs-Number of Steps: 10  Bathroom Shower/Tub: Walk-in shower  Bathroom Toilet: Handicapped height  Bathroom Equipment: Built-in shower seat  Home Living Comments: Pt reports craft room and storage are located on second level  Prior Level of Function:  Prior  Function Per Pt/Caregiver Report  Level of [place]: Independent with ADLs and functional transfers, Independent with homemaking with ambulation  Receives Help From: Family  ADL Assistance: Independent  Homemaking Assistance: Independent  Driving/Transportation: Family/Friend ( provides transporation. Pt reports ability to drive but license has  since last month)  Ambulatory Assistance: Independent (No AD)  Leisure: Pt enjoys making paper crafts  Hand Dominance: Right  Prior Function Comments: Pt denies recent falls  Precautions:  Precautions  Hearing/Visual Limitations: Pt reports wearing glasses while driving  Medical Precautions: Fall precautions, Abdominal precautions  Post-Surgical Precautions: Abdominal surgery precautions    Objective   Pain:  Pain Assessment  Pain Assessment: 0-10  0-10 (Numeric) Pain Score: 6  Pain Type: Surgical pain  Pain Location: Abdomen  Pain Interventions: Ambulation/increased activity  Response to Interventions: 3/10 pain reported  Cognition:  Cognition  Overall Cognitive Status: Within Functional Limits  Orientation Level: Oriented X4  Insight: Within function limits  Impulsive: Within functional limits    General Assessments:  Activity Tolerance  Endurance: Endurance does not limit participation in activity    Sensation  Light Touch: No apparent deficits    Coordination  Movements are Fluid and Coordinated: Yes    Postural Control  Head Control: Forward head in standing with gaze directed at the ground. Pt ableto correct with VC    Static Sitting Balance  Static Sitting-Balance Support: Bilateral upper extremity supported, Feet supported  Static Sitting-Level of Assistance: Distant supervision  Static Sitting-Comment/Number of Minutes: With posterior trunk support of recliner chair    Static Standing Balance  Static Standing-Balance Support: Bilateral upper extremity supported  Static Standing-Level of Assistance: Close supervision (Progressed from CGA)  Static  Standing-Comment/Number of Minutes: With FWW support  Dynamic Standing Balance  Dynamic Standing-Balance Support: Bilateral upper extremity supported  Dynamic Standing-Level of Assistance: Close supervision (Progressed from CGA)  Dynamic Standing-Comments: With FWW support  Functional Assessments:  Bed Mobility  Bed Mobility: No (Pt up in chair before and after session)    Transfers  Transfer: Yes  Transfer 1  Transfer From 1: Chair with arms to  Transfer to 1: Stand  Technique 1: Sit to stand  Transfer Device 1: Walker  Transfer Level of Assistance 1: Contact guard, Minimal verbal cues  Trials/Comments 1: VC for BUE push from the armrests of the chair to stand with return demonstration. Pt able to come to standing in a reasonable amount of time without overt LOB. Pt denies dizziness in standing.  Transfers 2  Transfer From 2: Stand to  Transfer to 2: Chair with arms  Technique 2: Stand to sit  Transfer Device 2: Walker  Transfer Level of Assistance 2: Contact guard, Minimal verbal cues  Trials/Comments 2: Good BLE positioning against the chair before attempting to sit. VC for BUE reach for the armrests of the chair when sitting. Fair control observed on descent to the chair.    Ambulation/Gait Training  Ambulation/Gait Training Performed: Yes  Ambulation/Gait Training 1  Surface 1: Level tile  Device 1: Rolling walker  Assistance 1: Close supervision, Minimal verbal cues (Progressed from CGA)  Quality of Gait 1: Narrow base of support, Forward flexed posture, Decreased step length, Diminished heel strike  Comments/Distance (ft) 1: 104 ft with a step-through pattern. Pt with gaze directed at the ground but able to correct with VC.Pt denies dizziness during trial.    Stairs  Stairs: No  Extremity/Trunk Assessments:  RLE   RLE : Exceptions to WFL  Strength RLE  R Hip Flexion: 3+/5  R Knee Extension: 4/5  R Ankle Dorsiflexion: 4/5  R Ankle Plantar Flexion: 3+/5  LLE   LLE : Exceptions to WFL  Strength LLE  L Hip  Flexion: 3+/5  L Knee Extension: 4/5  L Ankle Dorsiflexion: 4/5  L Ankle Plantar Flexion: 3+/5  Outcome Measures:  [facility] Basic Mobility  Turning from your back to your side while in a flat bed without using bedrails: A little  Moving from lying on your back to sitting on the side of a flat bed without using bedrails: A little  Moving to and from bed to chair (including a wheelchair): A little  Standing up from a chair using your arms (e.g. wheelchair or bedside chair): A little  To walk in hospital room: A little  Climbing 3-5 steps with railing: A lot  Basic Mobility - Total Score: 17    Encounter Problems       Encounter Problems (Active)       Balance       Goal 1 (Progressing)       Start:  09/12/24    Expected End:  09/26/24       Pt performs all sitting balance IND and standing balance mod IND using LRAD            Mobility       STG - Patient will ambulate (Progressing)       Start:  09/12/24    Expected End:  09/26/24       150 ft mod IND using LRAD         STG - Patient will ascend and descend a flight of stairs with L HR support mod IND (Not Progressing)       Start:  09/12/24    Expected End:  09/26/24               PT Transfers       STG - Patient to transfer to and from sit to supine (Not Progressing)       Start:  09/12/24    Expected End:  09/26/24       Mod IND using the log roll technique         STG - Patient will transfer sit to and from stand (Progressing)       Start:  09/12/24    Expected End:  09/26/24       Mod IND using LRAD              Education Documentation  Handouts, taught by Michele Berkowitz PT at 9/12/2024 11:08 AM.  Learner: Patient  Readiness: Acceptance  Method: Explanation, Demonstration, Handout  Response: Verbalizes Understanding    Precautions, taught by Michele Berkowitz PT at 9/12/2024 11:08 AM.  Learner: Patient  Readiness: Acceptance  Method: Explanation, Demonstration, Handout  Response: Verbalizes Understanding    Body Mechanics, taught by Michele Berkowitz PT at  9/12/2024 11:08 AM.  Learner: Patient  Readiness: Acceptance  Method: Explanation, Demonstration, Handout  Response: Verbalizes Understanding    Mobility Training, taught by Michele Berkowitz PT at 9/12/2024 11:08 AM.  Learner: Patient  Readiness: Acceptance  Method: Explanation, Demonstration, Handout  Response: Verbalizes Understanding    Education Comments  No comments found.

## 2024-09-12 NOTE — PROGRESS NOTES
"Maribel Lakhani is a 64 y.o. female on day 1 of admission presenting with Malignant neoplasm of transverse colon (Multi). Now POD#1 s/p Laparoscopic Extended Right Colectomy; Ileocolic Anastomosis.      Subjective   Patient c/o of fatigue and generalized weakness this morning, needs encouragement to get OOB bed. Denies N/V, able to tolerate some clears. Denies passing flatus.       Objective   PE:  Constitutional: A&Ox3, calm and cooperative  Eyes: , clear sclera   ENMT: Moist mucous membranes  Head/Neck: Neck supple  Cardiovascular: Normal rate and regular rhythm.  Respiratory/Thorax: Good symmetric chest expansion.   Gastrointestinal: Abdomen distended throughout lower abdomen, soft, appropriately tender, no peritoneal signs, laparotomy sites c/d/i, well approximate with Dermabond, no erythema or drainage    Genitourinary: Voiding independently   Musculoskeletal: ROM intact  Extremities: No peripheral edema  Neurological: A&Ox3  Psychological: Appropriate mood and behavior  Skin: Warm and dry    Last Recorded Vitals  Blood pressure 117/78, pulse 77, temperature 37.1 °C (98.7 °F), temperature source Temporal, resp. rate 18, height 1.727 m (5' 8\"), weight 60.6 kg (133 lb 9.6 oz), SpO2 97%.  Intake/Output last 3 Shifts:  I/O last 3 completed shifts:  In: 1851.3 (30.6 mL/kg) [P.O.:480; I.V.:1371.3 (22.6 mL/kg)]  Out: 610 (10.1 mL/kg) [Urine:600 (0.3 mL/kg/hr); Blood:10]  Weight: 60.6 kg     Relevant Results  Results for orders placed or performed during the hospital encounter of 09/11/24 (from the past 24 hour(s))   VERIFY ABO/Rh Group Test   Result Value Ref Range    ABO TYPE A     Rh TYPE POS    Basic Metabolic Panel   Result Value Ref Range    Glucose 99 74 - 99 mg/dL    Sodium 138 136 - 145 mmol/L    Potassium 4.4 3.5 - 5.3 mmol/L    Chloride 106 98 - 107 mmol/L    Bicarbonate 24 21 - 32 mmol/L    Anion Gap 12 10 - 20 mmol/L    Urea Nitrogen 8 6 - 23 mg/dL    Creatinine 0.53 0.50 - 1.05 mg/dL    eGFR >90 >60 " mL/min/1.73m*2    Calcium 8.9 8.6 - 10.3 mg/dL   CBC   Result Value Ref Range    WBC 7.8 4.4 - 11.3 x10*3/uL    nRBC 0.0 0.0 - 0.0 /100 WBCs    RBC 4.13 4.00 - 5.20 x10*6/uL    Hemoglobin 13.0 12.0 - 16.0 g/dL    Hematocrit 39.3 36.0 - 46.0 %    MCV 95 80 - 100 fL    MCH 31.5 26.0 - 34.0 pg    MCHC 33.1 32.0 - 36.0 g/dL    RDW 13.2 11.5 - 14.5 %    Platelets 223 150 - 450 x10*3/uL     No orders to display          Assessment/Plan   Assessment & Plan  Malignant neoplasm of transverse colon (Multi)    Colon cancer (Multi)    Maribel Lakhani is a 64 y.o. female on day 1 of admission presenting with Malignant neoplasm of transverse colon (Multi). Now POD#1 s/p Laparoscopic Extended Right Colectomy; Ileocolic Anastomosis.      GI: POD#1  Hx: Colon cancer, malignant neoplasm of transverse colon  - Continue CLD, continue IVF until able to tolerate PO  - ERAS protocol- nutritional supplements when Lucian  - Chewing gum   - Nutrition Consulted   - OOB/Up to chairs for meals  - Ambulate in spicer 5x day  - Antiemetics as needed    Neuro: Acute post operative pain, working on pain control   Hx: anxiety, MDD  - Adjustments made to pain medications  --> scheduled tylenol, robaxin, gabapentin    ---> PRN narcotics, minimize   - continue trazadone, venlafaxine   - 30 Day MME 7.00    Cardiac: RRR  Hx: HLD  - continue home Metoprolol     Respiratory: on RA, no increased WOB  - Encourage IS Q1 Hour  - Cough, deep breath    RENAL: creatinine 0.53  Voiding adequate amounts; lytes unremarkable  - Monitor and treat as needed  - repeat BMP in the AM  - remove messer catheter --> follow up post op void    HEME: h/h 13/39.3  -ERIC/SCD/Lovenox  - IV Iron while inpatient      MSK  Hx: lumbar spondylosis   - PT/OT to evaluate and treat    ID: WBC 7.8, Afebrile   -Monitor for s/sx of infections   - s/p post op antibiotics  - repeat CBC in the AM    Dispo: Discussed with Dr. Cotter, pain control, OOB as much as possible, awaiting RBF, will ADAT.    I  spent 35 minutes in the professional and overall care of this patient.      Estela Collier, APRN-CNP    Doing really well  Diet as tolerated  Discussed evaluation of distal duodenal/proximal jejunal lesion outpatient

## 2024-09-12 NOTE — OP NOTE
Laparoscopic Extended Right Colectomy; Ileocolic Anastomosis Operative Note     Date: 2024  OR Location: [facility] OR    Name: Maribel Lakhani, : 1960, Age: 64 y.o., MRN: [id number], Sex: female    Diagnosis  Pre-op Diagnosis      * Malignant neoplasm of transverse colon (Multi) [C18.4] Post-op Diagnosis     * Malignant neoplasm of transverse colon (Multi) [C18.4]     Procedures  Laparoscopic Extended Right Colectomy; Ileocolic Anastomosis  29287 - PA LAPAROSCOPY COLECTOMY PARTIAL W/ANASTOMOSIS      Surgeons      * Yann Cotter - Primary    Resident/Fellow/Other Assistant:  Surgeons and Role:  * No surgeons found with a matching role *    Procedure Summary  Anesthesia: General  ASA: III  Anesthesia Staff: Anesthesiologist: Taqueria Dailey MD; Peng Brizuela MD  C-AA: LUIS Porter  Estimated Blood Loss: 10 mL  Intra-op Medications:   Administrations occurring from 1130 to 1530 on 24:   Medication Name Total Dose   lactated Ringer's infusion Cannot be calculated   metroNIDAZOLE (Flagyl) 500 mg in sodium chloride (iso)  mL 500 mg              Anesthesia Record               Intraprocedure I/O Totals          Intake    lactated Ringer's infusion 1000.00 mL    Total Intake 1000 mL       Output    Est. Blood Loss 10 mL    Total Output 10 mL       Net    Net Volume 990 mL          Specimen:   ID Type Source Tests Collected by Time   1 : RIGHT COLON Tissue COLON - RIGHT HEMICOLECTOMY SURGICAL PATHOLOGY EXAM Yann Cotter MD 2024 1622        Staff:   Scrub Person: Carlota  Scrub Person: Alicia  Circulator: Miguel  Relief Circulator: Gloria  Relief Circulator: Marleena  Relief Scrub: Mary  Relief Circulator: Charito         Drains and/or Catheters:   Urethral Catheter Double-lumen 16 Fr. (Active)   Site Assessment Clean;Skin intact 24 2308   Collection Container Standard drainage bag 24 1715   Securement Method Securing device (Describe) 24 1715   Reason for Continuing Urinary  Catheterization surgical procedures: urological/gynecological, pelvic oncology, anal, prolonged surgical procedure 09/11/24 2000   Output (mL) 500 mL 09/12/24 0432       [REMOVED] NG/OG/Feeding Tube OG - Andreas sump 16 Fr Center mouth (Removed)   Tube Status Low intermittent suction 09/11/24 1521   Placement Verification Gastric contents 09/11/24 1521       Tourniquet Times:         Implants:     Findings: Hepatic flexure colon mass intraoperatively, very redundant transverse colon consistent with preop imaging.  Calcification versus growth at ligament of Treitz which was photographed and will need endoscopic workup    Indications: Maribel Lakhani is an 64 y.o. female who is having surgery for Malignant neoplasm of transverse colon (Multi) [C18.4].  Hepatic flexure mass with a very redundant transverse colon transverse colon biopsy confirmed adenocarcinoma.  No evidence of distant metastasis on imaging.  Atrial myxoma found on staging imaging requiring cardiac surgery intervention last month.    The patient was seen in the preoperative area. The risks, benefits, complications, treatment options, non-operative alternatives, expected recovery and outcomes were discussed with the patient. The possibilities of reaction to medication, pulmonary aspiration, injury to surrounding structures, bleeding, recurrent infection, the need for additional procedures, failure to diagnose a condition, and creating a complication requiring transfusion or operation were discussed with the patient. The patient concurred with the proposed plan, giving informed consent.  The site of surgery was properly noted/marked if necessary per policy. The patient has been actively warmed in preoperative area. Preoperative antibiotics have been ordered and given within 1 hours of incision. Venous thrombosis prophylaxis have been ordered including bilateral sequential compression devices and chemical prophylaxis    Procedure Details:   Patient was  identified in the preoperative area, transported to the operating room, and placed supine on the OR table.  General anesthesia was induced.  Arms were tucked and OG, Daugherty catheter, SCDs were placed.  Abdomen was prepped and draped in usual sterile fashion.  Time-out was performed confirming patient, procedure, relevant perioperative criteria.  Veress needle was inserted at oh's point with good drop test.  The abdomen was insufflated to 15 millimeters mercury.  5 millimeter Visiport was placed in the supraumbilical region.  Camera was inserted and no injury was identified.  5 millimeter ports were placed in the left upper and left lower quadrants under direct visualization.  Liver was evaluated with no obvious metastases.  Tattoo was present in the proximal transverse colon.  There was a significant amount of redundant transverse colon visualized.  Patient was positioned and attention was turned to the cecum.  Small bowel was packed out of the way.  The cecum was elevated the ileocolic artery was located.  Overlying peritoneum was scored and dissection was initiated in a medial to lateral fashion.  The duodenum was identified and protected. The ileocolic artery was burned twice proximally and then divided once distally. Medial dissection was then completed.  Attention was then turned to the lateral attachments.  The white line of Toldt was taken down from the cecum up to the hepatic flexure.  The omentum overlying the transverse colon was then elevated.  The gastrocolic ligaments were divided and the lesser sac was entered.  Additional attachments were taken down until the entire hepatic flexure was mobilized.  Attention was turned to the terminal ileum and lateral attachments were divided freely mobilizing small intestine.  The ureter was identified at this point and preserved.  Once the right colon was freely mobile, the cecum was grasped with a laparoscopic [place] and the supraumbilical incision was extended  around the umbilicus.  José Miguel wound retractor was placed and the right colon was then delivered through with a periumbilical incision.  A window was made in the terminal ileum and this was divided with a 75 millimeter blue load  FABIO stapler.  A window was made in the proximal transverse colon and this was divided.  Remaining mesentery was divided with energy device .  The stapled ends were then aligned confirming there was no twisting of the mesentery and small enterotomies were made.  A blue load FABIO stapler was then passed down the lumens and fired creating a side-to-side functional end anastomosis.  A 3 0 silk crotch stitch was placed.  Common enterotomy was then elevated and closed with a blue load stapler.  Mesenteric window was closed with 3 0 Vicryl.  Omentum was then placed over the common staple line.  Anastomosis was placed back in the abdomen.  Abdomen was re-insufflated and the anastomosis was seen in the right upper quadrant laparoscopically without any twisting of the mesentery.  During visualization of the mesentery we saw a 1 to 2 cm lesion on the serosa of the very proximal jejunum immediately at the ligament of Treitz.  This was soft and not an obvious malignancy but a possible calcification.  Given the location biopsy was not attempted at this time.  Four-quadrant laparoscopic tap block was performed with a total of 30 cc of half percent Marcaine with epinephrine.  Abdomen was irrigated. Gloves were changed.  Fascia was closed with a 2-0 PDS in a interrupted fashion.  Subcutaneous tissues were irrigated and closed with 3 0 Vicryl.  Skin and port sites were closed with 4 0 Monocryl.  Prior to complete closure all counts were confirmed correct.  Skin glue was then placed over the incisions.  Patient was extubated transported the PACU in stable condition.   Complications:  None; patient tolerated the procedure well.    Disposition: PACU - hemodynamically stable.  Condition: stable     Colon  Resection  Operation performed with curative intent Yes   Tumor Location Transverse colon   Extent of colon and vascular resection Right hemicolectomy - ileocolic, right colic (if present)          Additional Details:     Attending Attestation: I was present and scrubbed for the entire procedure.    Yann Cotter  Phone Number: [phone]

## 2024-09-12 NOTE — CARE PLAN
The patient's goals for the shift include      The clinical goals for the shift include Patient will remain comfortable throughout the shift.    Over the shift, the patient did not make progress toward the following goals. Barriers to progression include laparoscopic right colectomy with anastomosis. Recommendations to address these barriers include monitor vital signs.

## 2024-09-12 NOTE — PROGRESS NOTES
09/12/24 0951   Discharge Planning   Living Arrangements Spouse/significant other   Support Systems Spouse/significant other   Assistance Needed Independent prior to admission   Type of Residence Private residence   Who is requesting discharge planning? Other (Comment)  (TCC Assessment)   Home or Post Acute Services None   Expected Discharge Disposition Home   Does the patient need discharge transport arranged? No   Financial Resource Strain   How hard is it for you to pay for the very basics like food, housing, medical care, and heating? Not hard   Housing Stability   In the last 12 months, was there a time when you were not able to pay the mortgage or rent on time? N   In the past 12 months, how many times have you moved where you were living? 0   At any time in the past 12 months, were you homeless or living in a shelter (including now)? N   Transportation Needs   In the past 12 months, has lack of transportation kept you from medical appointments or from getting medications? no   In the past 12 months, has lack of transportation kept you from meetings, work, or from getting things needed for daily living? No     Patient is POD 1 for a colectomy.  PLAN/BARRIER: return of bowel function, pain control, tolerate diet  DISP: home with spouse and no homecare needs  ADOD: 2-3 days  Jayshree Stewart RN

## 2024-09-12 NOTE — PROGRESS NOTES
Occupational Therapy    Evaluation    Patient Name: Maribel Lakhani  MRN: [id number]  Department: [facility] A 7  Room: 73/734-A  Today's Date: 9/12/2024  Time Calculation  Start Time: 0925  Stop Time: 0950  Time Calculation (min): 25 min        Assessment:  OT Assessment: OT eval completed, pt s/p laparoscopic extended right colectomy; ileocolic anastomosis. Currently below baseline for ADLs and functional mobility. Pt limited by pain this date, however pain decreased with OOB activity. At this time anticipate pt continue to progress, recommend low intensity therapies.  Prognosis: Good  Evaluation/Treatment Tolerance: Patient tolerated treatment well  Medical Staff Made Aware: Yes  End of Session Communication: Bedside nurse  End of Session Patient Position: Up in chair, Alarm on  OT Assessment Results: Decreased ADL status, Decreased IADLs, Decreased functional mobility  Prognosis: Good  Evaluation/Treatment Tolerance: Patient tolerated treatment well  Medical Staff Made Aware: Yes  Strengths: Ability to acquire knowledge, Capable of completing ADLs semi/independent, Premorbid level of function  Barriers to Participation: Comorbidities  Plan:  Treatment Interventions: ADL retraining, Functional transfer training, Equipment evaluation/education  OT Frequency: 3 times per week  OT Discharge Recommendations: Low intensity level of continued care  OT - OK to Discharge: Yes (OT POC established this date)  Treatment Interventions: ADL retraining, Functional transfer training, Equipment evaluation/education    Subjective   Current Problem:  1. Colon cancer (Multi)  Insert and maintain peripheral IV    Saline lock IV    Admit to inpatient    Full code    Pulse oximetry, spot    metroNIDAZOLE (Flagyl) 500 mg in sodium chloride (iso)  mL    heparin (porcine) injection 5,000 Units    Insert and maintain peripheral IV    Saline lock IV    Admit to inpatient    Full code    Pulse oximetry, spot    DISCONTINUED: lactated Ringer's  infusion    DISCONTINUED: levoFLOXacin (Levaquin) 500 mg in dextrose 5%  mL    CANCELED: NPO Diet Except: Sips with meds; Effective now    CANCELED: Height and weight    CANCELED: Type And Screen    CANCELED: Apply sequential compression device    CANCELED: Vital Signs    CANCELED: Please follow ERAS protocol    CANCELED: POCT Glucose    CANCELED: NPO Diet Except: Sips with meds; Effective now    CANCELED: Height and weight    CANCELED: Type And Screen    CANCELED: Apply sequential compression device    CANCELED: Vital Signs    CANCELED: Please follow ERAS protocol    CANCELED: POCT Glucose      2. Malignant neoplasm of transverse colon (Multi)  Surgical Pathology Exam    Surgical Pathology Exam      3. Mobility impaired          General:  General  Reason for Referral: laparoscopic extended right colectomy; ileocolic anastomosis on 9/11  Referred By: Cyndee  Past Medical History Relevant to Rehab:   Past Medical History:   Diagnosis Date    Anxiety and depression     Cardiology follow-up encounter 08/19/2024    Dianne Coppola MD    Encounter for pre-operative cardiovascular clearance 08/19/2024    Dianne Coppola MD- She can undergo colon surgery if needed to be done, she is intermediate risk. I advised to wait 30 days from the day of surgery    H/O transesophageal echocardiography (FABIOLA) for monitoring 08/02/2024    CONCLUSIONS:  1. The left ventricular systolic function is normal, with a visually estimated ejection fraction of 65%.  2. Left ventricular diastolic filling was indeterminate.  3. Left ventricular cavity size is decreased.  4. There is normal right ventricular global systolic function.  5. Aortic valve stenosis is not present.  6. Mild aortic valve regurgitation.    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       Prior to Session Communication: Bedside nurse  Patient Position Received: Bed, 3 rail up, Alarm on  Preferred Learning Style: auditory, kinesthetic, visual  General Comment: pt supine in bed upon entry, agreeable to therapy  Precautions:  Medical Precautions: Fall precautions, Abdominal precautions  Post-Surgical Precautions: Abdominal surgery precautions    Vital Sign (Past 2hrs)        Date/Time Vitals Session Patient Position Pulse Resp SpO2 BP MAP (mmHg)    09/12/24 1238 --  --  73  18  96 %  120/80  93                         Pain:  Pain Assessment  Pain Assessment: 0-10  0-10 (Numeric) Pain Score: 6  Pain Type: Surgical pain  Pain Location: Abdomen  Pain Interventions: Ambulation/increased activity, Repositioned    Objective   Cognition:  Overall Cognitive Status: Within Functional Limits  Orientation Level: Oriented X4  Attention: Within Functional Limits  Memory: Within Funtional Limits  Safety/Judgement: Within Functional Limits  Insight: Within function limits  Impulsive: Within functional limits           Home Living:  Type of Home: House  Lives With: Spouse  Home Layout: Two level, Able to live on main level with bedroom/bathroom  Home Access: Stairs to enter with rails  Entrance Stairs-Rails: Left  Entrance Stairs-Number of Steps:  (6+6)  Bathroom Shower/Tub: Walk-in shower  Bathroom Toilet: Other (Comment) (raised)  Bathroom Equipment: Built-in shower seat  Prior Function:  Level of Reserve: Independent with ADLs and functional transfers, Independent with homemaking with ambulation  Receives Help From: Family  ADL Assistance: Independent  Homemaking Assistance: Independent  Ambulatory Assistance: Independent  Hand Dominance: Right  IADL History:  Homemaking Responsibilities: Yes  ADL:  Grooming Assistance: Stand by  Grooming Deficit: Supervision/safety, Wash/dry hands  LE Dressing  Assistance: Total  LE Dressing Deficit: Don/doff R sock, Don/doff L sock  Toileting Assistance with Device: Stand by  Toileting Deficit: Supervison/safety, Grab bar use  ADL Comments: increased time needed due to pain; cues for use of grab bars to assist with transfers in bathroom  Activity Tolerance:  Endurance: Endurance does not limit participation in activity  Bed Mobility/Transfers: Bed Mobility  Bed Mobility: Yes (supine to sit: CGA, bedrail to assist, cues for log roll. increased time needed due to pain)    Transfers  Transfer: Yes  Transfer 1  Technique 1: Sit to stand  Transfer Device 1: Walker  Transfer Level of Assistance 1: Contact guard  Trials/Comments 1: VC to push up from EOB  Transfers 2  Technique 2: Stand to sit  Transfer Device 2: Walker  Transfer Level of Assistance 2: Close supervision      Functional Mobility:  Functional Mobility  Functional Mobility Performed: Yes  Functional Mobility 1  Surface 1: Level tile  Device 1: Rolling walker  Assistance 1: Close supervision  Comments 1: to/from restroom  Sitting Balance:  Static Sitting Balance  Static Sitting-Balance Support: Feet supported  Static Sitting-Level of Assistance: Independent  Dynamic Sitting Balance  Dynamic Sitting-Balance Support: Feet supported  Dynamic Sitting-Level of Assistance: Distant supervision  Dynamic Sitting-Balance: Lateral lean  Dynamic Sitting-Comments: hao-care in sitting, no LOB noted  Standing Balance:  Static Standing Balance  Static Standing-Balance Support: Bilateral upper extremity supported  Static Standing-Level of Assistance: Close supervision  Dynamic Standing Balance  Dynamic Standing-Balance Support: Left upper extremity supported  Dynamic Standing-Level of Assistance: Close supervision  Dynamic Standing-Balance: Reaching for objects     Sensation:  Light Touch: No apparent deficits  Strength:  Strength Comments:  (grossly WFL)  Perception:     Coordination:  Movements are Fluid and Coordinated: Yes    Hand Function:  Gross Grasp: Functional  Coordination: Functional  Extremities: RUE   RUE : Within Functional Limits and LUE   LUE: Within Functional Limits        Outcome Measures:[facility] Daily Activity  Putting on and taking off regular lower body clothing: A lot  Bathing (including washing, rinsing, drying): A lot  Putting on and taking off regular upper body clothing: A little  Toileting, which includes using toilet, bedpan or urinal: A little  Taking care of personal grooming such as brushing teeth: None  Eating Meals: None  Daily Activity - Total Score: 18        Education Documentation  Body Mechanics, taught by Gertrudis Cardenas OT at 9/12/2024  1:31 PM.  Learner: Patient  Readiness: Acceptance  Method: Explanation  Response: Verbalizes Understanding    Precautions, taught by Gertrudis Cardenas OT at 9/12/2024  1:31 PM.  Learner: Patient  Readiness: Acceptance  Method: Explanation  Response: Verbalizes Understanding    ADL Training, taught by Gertrudis Cardenas OT at 9/12/2024  1:31 PM.  Learner: Patient  Readiness: Acceptance  Method: Explanation  Response: Verbalizes Understanding    Education Comments  No comments found.        OP EDUCATION:       Goals:  Encounter Problems       Encounter Problems (Active)       ADLs       Patient will perform UB and LB bathing with modified independent level of assistance and shower chair.       Start:  09/12/24    Expected End:  09/26/24            Patient with complete lower body dressing with modified independent level of assistance donning and doffing all LE clothes  with PRN adaptive equipment while edge of bed        Start:  09/12/24    Expected End:  09/26/24            Patient will complete daily grooming tasks brushing teeth and washing face/hair with modified independent level of assistance and PRN adaptive equipment while edge of bed .       Start:  09/12/24    Expected End:  09/26/24            Patient will complete toileting including hygiene clothing  management/hygiene with modified independent level of assistance and grab bars.       Start:  09/12/24    Expected End:  09/26/24               MOBILITY       Patient will perform Functional mobility min Household distances/Community Distances with modified independent level of assistance and least restrictive device in order to improve safety and functional mobility.       Start:  09/12/24    Expected End:  09/26/24               TRANSFERS       Patient will complete functional transfer to toilet with least restrictive device with modified independent level of assistance.       Start:  09/12/24    Expected End:  09/26/24

## 2024-09-13 ENCOUNTER — APPOINTMENT (OUTPATIENT)
Dept: CARDIOLOGY | Facility: HOSPITAL | Age: 64
DRG: 330 | End: 2024-09-13
Payer: COMMERCIAL

## 2024-09-13 LAB
ANION GAP SERPL CALC-SCNC: 11 MMOL/L (ref 10–20)
BUN SERPL-MCNC: 5 MG/DL (ref 6–23)
CALCIUM SERPL-MCNC: 8.5 MG/DL (ref 8.6–10.3)
CHLORIDE SERPL-SCNC: 107 MMOL/L (ref 98–107)
CO2 SERPL-SCNC: 26 MMOL/L (ref 21–32)
CREAT SERPL-MCNC: 0.51 MG/DL (ref 0.5–1.05)
EGFRCR SERPLBLD CKD-EPI 2021: >90 ML/MIN/1.73M*2
ERYTHROCYTE [DISTWIDTH] IN BLOOD BY AUTOMATED COUNT: 13.4 % (ref 11.5–14.5)
GLUCOSE SERPL-MCNC: 81 MG/DL (ref 74–99)
HCT VFR BLD AUTO: 36.9 % (ref 36–46)
HGB BLD-MCNC: 12.3 G/DL (ref 12–16)
MCH RBC QN AUTO: 32 PG (ref 26–34)
MCHC RBC AUTO-ENTMCNC: 33.3 G/DL (ref 32–36)
MCV RBC AUTO: 96 FL (ref 80–100)
NRBC BLD-RTO: 0 /100 WBCS (ref 0–0)
PLATELET # BLD AUTO: 186 X10*3/UL (ref 150–450)
POTASSIUM SERPL-SCNC: 3.5 MMOL/L (ref 3.5–5.3)
RBC # BLD AUTO: 3.84 X10*6/UL (ref 4–5.2)
SODIUM SERPL-SCNC: 140 MMOL/L (ref 136–145)
WBC # BLD AUTO: 5.8 X10*3/UL (ref 4.4–11.3)

## 2024-09-13 PROCEDURE — 93005 ELECTROCARDIOGRAM TRACING: CPT

## 2024-09-13 PROCEDURE — 2500000004 HC RX 250 GENERAL PHARMACY W/ HCPCS (ALT 636 FOR OP/ED): Performed by: SURGERY

## 2024-09-13 PROCEDURE — 36415 COLL VENOUS BLD VENIPUNCTURE: CPT | Performed by: SURGERY

## 2024-09-13 PROCEDURE — 85027 COMPLETE CBC AUTOMATED: CPT | Performed by: SURGERY

## 2024-09-13 PROCEDURE — 1100000001 HC PRIVATE ROOM DAILY

## 2024-09-13 PROCEDURE — 99221 1ST HOSP IP/OBS SF/LOW 40: CPT | Performed by: NURSE PRACTITIONER

## 2024-09-13 PROCEDURE — 99231 SBSQ HOSP IP/OBS SF/LOW 25: CPT | Performed by: NURSE PRACTITIONER

## 2024-09-13 PROCEDURE — 97530 THERAPEUTIC ACTIVITIES: CPT | Mod: GP,CQ

## 2024-09-13 PROCEDURE — 2500000001 HC RX 250 WO HCPCS SELF ADMINISTERED DRUGS (ALT 637 FOR MEDICARE OP): Performed by: SURGERY

## 2024-09-13 PROCEDURE — 2500000001 HC RX 250 WO HCPCS SELF ADMINISTERED DRUGS (ALT 637 FOR MEDICARE OP): Performed by: NURSE PRACTITIONER

## 2024-09-13 PROCEDURE — 97116 GAIT TRAINING THERAPY: CPT | Mod: GP,CQ

## 2024-09-13 PROCEDURE — 9420000001 HC RT PATIENT EDUCATION 5 MIN

## 2024-09-13 PROCEDURE — 82374 ASSAY BLOOD CARBON DIOXIDE: CPT | Performed by: SURGERY

## 2024-09-13 PROCEDURE — 2500000001 HC RX 250 WO HCPCS SELF ADMINISTERED DRUGS (ALT 637 FOR MEDICARE OP)

## 2024-09-13 PROCEDURE — 99222 1ST HOSP IP/OBS MODERATE 55: CPT | Performed by: INTERNAL MEDICINE

## 2024-09-13 RX ORDER — METHOCARBAMOL 500 MG/1
500 TABLET, FILM COATED ORAL EVERY 6 HOURS PRN
Status: DISCONTINUED | OUTPATIENT
Start: 2024-09-13 | End: 2024-09-16 | Stop reason: HOSPADM

## 2024-09-13 ASSESSMENT — COGNITIVE AND FUNCTIONAL STATUS - GENERAL
MOVING FROM LYING ON BACK TO SITTING ON SIDE OF FLAT BED WITH BEDRAILS: A LITTLE
STANDING UP FROM CHAIR USING ARMS: A LITTLE
DRESSING REGULAR LOWER BODY CLOTHING: A LITTLE
TURNING FROM BACK TO SIDE WHILE IN FLAT BAD: A LITTLE
TURNING FROM BACK TO SIDE WHILE IN FLAT BAD: A LITTLE
MOVING FROM LYING ON BACK TO SITTING ON SIDE OF FLAT BED WITH BEDRAILS: A LITTLE
DRESSING REGULAR LOWER BODY CLOTHING: A LITTLE
TURNING FROM BACK TO SIDE WHILE IN FLAT BAD: A LITTLE
WALKING IN HOSPITAL ROOM: A LITTLE
MOVING FROM LYING ON BACK TO SITTING ON SIDE OF FLAT BED WITH BEDRAILS: A LITTLE
MOBILITY SCORE: 18
CLIMB 3 TO 5 STEPS WITH RAILING: A LITTLE
MOVING FROM LYING ON BACK TO SITTING ON SIDE OF FLAT BED WITH BEDRAILS: A LITTLE
STANDING UP FROM CHAIR USING ARMS: A LITTLE
MOVING FROM LYING ON BACK TO SITTING ON SIDE OF FLAT BED WITH BEDRAILS: A LITTLE
CLIMB 3 TO 5 STEPS WITH RAILING: A LITTLE
MOVING TO AND FROM BED TO CHAIR: A LITTLE
MOBILITY SCORE: 18
MOVING FROM LYING ON BACK TO SITTING ON SIDE OF FLAT BED WITH BEDRAILS: A LITTLE
WALKING IN HOSPITAL ROOM: A LITTLE
STANDING UP FROM CHAIR USING ARMS: A LITTLE
DAILY ACTIVITIY SCORE: 22
WALKING IN HOSPITAL ROOM: A LITTLE
MOBILITY SCORE: 18
MOBILITY SCORE: 18
STANDING UP FROM CHAIR USING ARMS: A LITTLE
CLIMB 3 TO 5 STEPS WITH RAILING: A LITTLE
TURNING FROM BACK TO SIDE WHILE IN FLAT BAD: A LITTLE
MOVING TO AND FROM BED TO CHAIR: A LITTLE
WALKING IN HOSPITAL ROOM: A LITTLE
WALKING IN HOSPITAL ROOM: A LITTLE
CLIMB 3 TO 5 STEPS WITH RAILING: A LITTLE
DRESSING REGULAR LOWER BODY CLOTHING: A LITTLE
MOVING TO AND FROM BED TO CHAIR: A LITTLE
WALKING IN HOSPITAL ROOM: A LITTLE
WALKING IN HOSPITAL ROOM: A LITTLE
DRESSING REGULAR LOWER BODY CLOTHING: A LITTLE
DAILY ACTIVITIY SCORE: 23
TURNING FROM BACK TO SIDE WHILE IN FLAT BAD: A LITTLE
MOVING TO AND FROM BED TO CHAIR: A LITTLE
CLIMB 3 TO 5 STEPS WITH RAILING: A LITTLE
STANDING UP FROM CHAIR USING ARMS: A LITTLE
STANDING UP FROM CHAIR USING ARMS: A LITTLE
TURNING FROM BACK TO SIDE WHILE IN FLAT BAD: A LITTLE
MOBILITY SCORE: 18
MOVING TO AND FROM BED TO CHAIR: A LITTLE
TURNING FROM BACK TO SIDE WHILE IN FLAT BAD: A LITTLE
DRESSING REGULAR LOWER BODY CLOTHING: A LITTLE
MOVING FROM LYING ON BACK TO SITTING ON SIDE OF FLAT BED WITH BEDRAILS: A LITTLE
STANDING UP FROM CHAIR USING ARMS: A LITTLE
STANDING UP FROM CHAIR USING ARMS: A LITTLE
MOVING TO AND FROM BED TO CHAIR: A LITTLE
DAILY ACTIVITIY SCORE: 23
WALKING IN HOSPITAL ROOM: A LITTLE
CLIMB 3 TO 5 STEPS WITH RAILING: A LITTLE
MOVING TO AND FROM BED TO CHAIR: A LITTLE
DAILY ACTIVITIY SCORE: 23
MOVING TO AND FROM BED TO CHAIR: A LITTLE
DRESSING REGULAR UPPER BODY CLOTHING: A LITTLE
MOVING FROM LYING ON BACK TO SITTING ON SIDE OF FLAT BED WITH BEDRAILS: A LITTLE
DRESSING REGULAR LOWER BODY CLOTHING: A LITTLE
CLIMB 3 TO 5 STEPS WITH RAILING: A LITTLE
DRESSING REGULAR LOWER BODY CLOTHING: A LITTLE
DAILY ACTIVITIY SCORE: 23
MOBILITY SCORE: 18
DAILY ACTIVITIY SCORE: 23
TURNING FROM BACK TO SIDE WHILE IN FLAT BAD: A LITTLE
MOBILITY SCORE: 18
MOBILITY SCORE: 18
CLIMB 3 TO 5 STEPS WITH RAILING: A LITTLE
DAILY ACTIVITIY SCORE: 23

## 2024-09-13 ASSESSMENT — PAIN - FUNCTIONAL ASSESSMENT
PAIN_FUNCTIONAL_ASSESSMENT: 0-10

## 2024-09-13 ASSESSMENT — PAIN SCALES - GENERAL
PAINLEVEL_OUTOF10: 9
PAINLEVEL_OUTOF10: 0 - NO PAIN
PAINLEVEL_OUTOF10: 7
PAINLEVEL_OUTOF10: 3
PAINLEVEL_OUTOF10: 7
PAINLEVEL_OUTOF10: 8
PAINLEVEL_OUTOF10: 4
PAINLEVEL_OUTOF10: 0 - NO PAIN

## 2024-09-13 ASSESSMENT — PAIN DESCRIPTION - ORIENTATION: ORIENTATION: LOWER

## 2024-09-13 ASSESSMENT — PAIN DESCRIPTION - LOCATION: LOCATION: ABDOMEN

## 2024-09-13 ASSESSMENT — PAIN SCALES - WONG BAKER: WONGBAKER_NUMERICALRESPONSE: HURTS EVEN MORE

## 2024-09-13 NOTE — H&P (VIEW-ONLY)
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 female who was recently diagnosed with colon cancer in the transverse colon, presented for laparoscopic right colectomy.  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 Pt stated she feel better after surgery, no BM yet, but still a lot of cramping and flatus. She never had EGD, denies any upper GI symptoms.     Colonoscopy 6/13/24  3 subcentimeter polyps were removed  Single malignant-appearing mass in the mid transverse colon, covering one third of the circumference; performed cold forceps biopsy with partial removal; placed 1 clip successfully; tattooed 3 cm distal to the finding  Diverticulosis in the sigmoid colon         Past Medical History  She has a past medical history of Anxiety and depression, Cardiology follow-up encounter (08/19/2024), Encounter for pre-operative cardiovascular clearance (08/19/2024), H/O transesophageal echocardiography (FABIOLA) for monitoring (08/02/2024), Hemorrhoidal skin tag (08/29/2023), History of cardiac cath (07/26/2024), Hyperlipidemia, Insomnia, Lumbar spondylosis, Malignant neoplasm of transverse colon (Multi), and Myxoma.    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     Social History  She reports that she quit smoking about 1 months ago. Her smoking use included cigarettes. She has a 7.5 pack-year smoking history. She has been exposed to tobacco smoke. She has never used smokeless tobacco. She reports current alcohol use of about 4.0 standard drinks of alcohol per week. She reports that she does not use drugs.    Family History  Family History  "  Problem Relation Name Age of Onset    Lupus Mother      Sjogren's syndrome Mother      Fibromyalgia Mother      Cancer Father JASON Mercado     Hearing loss Father JASON Mercado     Cancer Maternal Grandmother Catia Santa         Allergies  Adhesive tape-silicones, Ampicillin, Cefdinir, and Bruqrnan-7-an2 antimigraine agents    Review of Systems  10 systems reviewed and negative other than HPI     Physical Exam  Physical Exam  Vitals reviewed.   Constitutional:       Appearance: Normal appearance. She is normal weight.   HENT:      Head: Normocephalic and atraumatic.      Nose: Nose normal.      Mouth/Throat:      Pharynx: Oropharynx is clear.   Eyes:      Conjunctiva/sclera: Conjunctivae normal.   Cardiovascular:      Rate and Rhythm: Normal rate and regular rhythm.      Heart sounds: Normal heart sounds.   Pulmonary:      Breath sounds: Normal breath sounds.   Abdominal:      General: Bowel sounds are increased. There is no distension.      Palpations: Abdomen is soft. There is no mass.      Tenderness: There is abdominal tenderness. There is no guarding or rebound.      Hernia: No hernia is present.      Comments: Lap incisions  intact, covered with dermabond   Musculoskeletal:      Cervical back: Neck supple.   Neurological:      Mental Status: She is alert.            Last Recorded Vitals  Blood pressure 122/87, pulse 74, temperature 37.1 °C (98.7 °F), temperature source Temporal, resp. rate 18, height 1.727 m (5' 8\"), weight 64.5 kg (142 lb 3.2 oz), SpO2 95%.    Relevant Results      Scheduled medications  acetaminophen, 975 mg, oral, q6h  enoxaparin, 40 mg, subcutaneous, q24h  gabapentin, 300 mg, oral, TID  metoprolol tartrate, 12.5 mg, oral, BID  pantoprazole, 20 mg, intravenous, q24h NATALIA  traZODone, 100 mg, oral, Daily  venlafaxine XR, 37.5 mg, oral, Daily      Continuous medications  sodium chloride 0.9%, 40 mL/hr, Last Rate: 40 mL/hr (09/12/24 1758)      PRN medications  PRN medications: " benzocaine-menthol, HYDROmorphone, melatonin, methocarbamol, naloxone, oxyCODONE, oxyCODONE, oxygen, promethazine **OR** promethazine  XR chest 2 views    Result Date: 9/4/2024  Interpreted By:  Riaz Jean, STUDY: XR CHEST 2 VIEWS;  9/4/2024 2:06 pm   INDICATION: Signs/Symptoms:P/OP.   ,D15.1 Benign neoplasm of heart ([facility]-HCC)   COMPARISON: Exam dated 08/05/2024   ACCESSION NUMBER(S): [id number]   ORDERING CLINICIAN: TRINI VIZCARRA   FINDINGS: PA and lateral radiographs of the chest were provided.   Possible pacing wire projects over the cardiac silhouette.   CARDIOMEDIASTINAL SILHOUETTE: Cardiomediastinal silhouette is normal in size and configuration.   LUNGS: Lungs are clear.   ABDOMEN: No remarkable upper abdominal findings.   BONES: No acute osseous changes.       1.  No evidence of acute cardiopulmonary process.       MACRO: None   Signed by: Riaz Jean 9/4/2024 3:49 PM Dictation workstation:   [id number]   Results for orders placed or performed during the hospital encounter of 09/11/24 (from the past 24 hour(s))   Basic Metabolic Panel   Result Value Ref Range    Glucose 81 74 - 99 mg/dL    Sodium 140 136 - 145 mmol/L    Potassium 3.5 3.5 - 5.3 mmol/L    Chloride 107 98 - 107 mmol/L    Bicarbonate 26 21 - 32 mmol/L    Anion Gap 11 10 - 20 mmol/L    Urea Nitrogen 5 (L) 6 - 23 mg/dL    Creatinine 0.51 0.50 - 1.05 mg/dL    eGFR >90 >60 mL/min/1.73m*2    Calcium 8.5 (L) 8.6 - 10.3 mg/dL   CBC   Result Value Ref Range    WBC 5.8 4.4 - 11.3 x10*3/uL    nRBC 0.0 0.0 - 0.0 /100 WBCs    RBC 3.84 (L) 4.00 - 5.20 x10*6/uL    Hemoglobin 12.3 12.0 - 16.0 g/dL    Hematocrit 36.9 36.0 - 46.0 %    MCV 96 80 - 100 fL    MCH 32.0 26.0 - 34.0 pg    MCHC 33.3 32.0 - 36.0 g/dL    RDW 13.4 11.5 - 14.5 %    Platelets 186 150 - 450 x10*3/uL          Assessment/Plan     64 y.o. female who was recently diagnosed with colon cancer in the transverse colon, presented for laparoscopic right colectomy.  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    D/w Dr Campos  - Consider CT enterography.   - could add on for push enteroscopy on Monday. The location of the tumor may be out of reach of EUS scope  - all other treatment per surgical team      CARRI Ham-CNP

## 2024-09-13 NOTE — PROGRESS NOTES
Physical Therapy    Physical Therapy Treatment    Patient Name: Maribel Lakhani  MRN: [id number]  Department: [place] [address]  Room: [address] [place] [address]  Today's Date: 9/13/2024  Time Calculation  Start Time: 1035  Stop Time: 1113  Time Calculation (min): 38 min         Assessment/Plan   PT Assessment  End of Session Communication: Bedside nurse  Assessment Comment: pt able to walk in hallway and complete stair training  End of Session Patient Position: Alarm on, Up in chair  PT Plan  Inpatient/Swing Bed or Outpatient: Inpatient  PT Plan  Treatment/Interventions: Transfer training, Gait training, Stair training  PT Plan: Ongoing PT  PT Frequency: 4 times per week  PT Discharge Recommendations: Low intensity level of continued care  Equipment Recommended upon Discharge: Wheeled walker  PT Recommended Transfer Status: Stand by assist  PT - OK to Discharge: Yes (per POC)      General Visit Information:   PT  Visit  PT Received On: 09/13/24  General  Reason for Referral: 63 y/o F s/p laparoscopic extended colectomy; ileocolic anastomosis on 9/11/24  Referred By: TERESO Cotter  Prior to Session Communication: Bedside nurse  Patient Position Received: Up in chair, Alarm on  Preferred Learning Style: auditory, kinesthetic, visual  General Comment: agreeable to tx    Subjective   Precautions:  Precautions  Medical Precautions: Fall precautions, Abdominal precautions  Post-Surgical Precautions: Abdominal surgery precautions    Vital Signs (Past 2hrs)        Date/Time Vitals Session Patient Position Pulse Resp SpO2 BP MAP (mmHg)    09/13/24 1529 --  --  73  18  95 %  143/90  --                         Objective   Pain:  Pain Assessment  0-10 (Numeric) Pain Score: 3  Pain Type: Surgical pain  Pain Location: Abdomen  Pain Interventions: Medication (See MAR)  Cognition:  Cognition  Overall Cognitive Status: Within Functional Limits  Coordination:     Postural Control:     Extremity/Trunk Assessments:    Activity Tolerance:     Treatments:                  Ambulation/Gait Training  Ambulation/Gait Training Performed: Yes  Ambulation/Gait Training 1  Surface 1: Level tile  Device 1: Rolling walker  Gait Support Devices: Gait belt  Assistance 1: Close supervision  Comments/Distance (ft) 1: 500x1, 450x1 (pt performed wtih step through gait pattern.  slow and steady throughout.  no overt LOB)  Transfer 1  Technique 1: Sit to stand, Stand to sit  Transfer Device 1: Walker  Transfer Level of Assistance 1: Close supervision  Trials/Comments 1: vc/tc for hand placment on solid sitting surface and not RW.    Stairs  Stairs: Yes  Stairs  Comment/Number of Steps 1: pt performed 4 steps with 1 rail.  1 and 2 UE support. pt reports feeling more comfortable using BUE support.  step to gait pattern.    Outcome Measures:  [facility] Basic Mobility  Turning from your back to your side while in a flat bed without using bedrails: A little  Moving from lying on your back to sitting on the side of a flat bed without using bedrails: A little  Moving to and from bed to chair (including a wheelchair): A little  Standing up from a chair using your arms (e.g. wheelchair or bedside chair): A little  To walk in hospital room: A little  Climbing 3-5 steps with railing: A little  Basic Mobility - Total Score: 18    Education Documentation  Mobility Training, taught by Thomas Bell PTA at 9/13/2024  4:16 PM.  Learner: Patient  Readiness: Acceptance  Method: Explanation  Response: Verbalizes Understanding    Education Comments  No comments found.        OP EDUCATION:  Outpatient Education  Education Comment: Educated pt on surgical precautions as well as breathing technique to help decrease discomfort.    Encounter Problems       Encounter Problems (Active)       Balance       Goal 1 (Progressing)       Start:  09/12/24    Expected End:  09/26/24       Pt performs all sitting balance IND and standing balance mod IND using LRAD            Mobility       STG - Patient will ambulate (Progressing)        Start:  09/12/24    Expected End:  09/26/24       150 ft mod IND using LRAD         STG - Patient will ascend and descend a flight of stairs with L HR support mod IND (Progressing)       Start:  09/12/24    Expected End:  09/26/24               PT Transfers       STG - Patient to transfer to and from sit to supine (Progressing)       Start:  09/12/24    Expected End:  09/26/24       Mod IND using the log roll technique         STG - Patient will transfer sit to and from stand (Progressing)       Start:  09/12/24    Expected End:  09/26/24       Mod IND using LRAD            Pain - Adult

## 2024-09-13 NOTE — CARE PLAN
The patient's goals for the shift include      The clinical goals for the shift include Patient will remain comfortable throughout the shift    Over the shift, the patient did not make progress toward the following goals. Barriers to progression include post op day 2 of a right colectomy with anastomosis. Recommendations to address these barriers include encourage ambulation and pain meds as needed.

## 2024-09-13 NOTE — PROGRESS NOTES
"Maribel Lakhani is a 64 y.o. female on day 2 of admission presenting with Malignant neoplasm of transverse colon (Multi).    Subjective   Pt reporting some belching this morning and inability to pass gas. She did do several walks yesterday with her daughter. Denies n/v, fever, chills, CP and SOB. Afebrile, VSS       Objective     Physical Exam  Constitutional:       Appearance: Normal appearance.   Cardiovascular:      Rate and Rhythm: Normal rate and regular rhythm.   Pulmonary:      Effort: Pulmonary effort is normal.   Abdominal:      Comments: Appropriately tender, mildly distended, soft. Incision closed with Dermabond, C/D/I, edges well approximated, minor bruising without drainage or hematoma.     Genitourinary:     Comments: Voiding without difficulty   Musculoskeletal:         General: Normal range of motion.   Skin:     General: Skin is warm and dry.   Neurological:      Mental Status: She is oriented to person, place, and time.   Psychiatric:         Mood and Affect: Mood normal.         Behavior: Behavior normal.       Last Recorded Vitals  Blood pressure 122/87, pulse 74, temperature 37.1 °C (98.7 °F), temperature source Temporal, resp. rate 18, height 1.727 m (5' 8\"), weight 64.5 kg (142 lb 3.2 oz), SpO2 95%.  Intake/Output last 3 Shifts:  I/O last 3 completed shifts:  In: 1891.3 (29.3 mL/kg) [P.O.:840; I.V.:1051.3 (16.3 mL/kg)]  Out: 2150 (33.3 mL/kg) [Urine:2150 (0.9 mL/kg/hr)]  Weight: 64.5 kg     Relevant Results  Results for orders placed or performed during the hospital encounter of 09/11/24 (from the past 24 hour(s))   Basic Metabolic Panel   Result Value Ref Range    Glucose 81 74 - 99 mg/dL    Sodium 140 136 - 145 mmol/L    Potassium 3.5 3.5 - 5.3 mmol/L    Chloride 107 98 - 107 mmol/L    Bicarbonate 26 21 - 32 mmol/L    Anion Gap 11 10 - 20 mmol/L    Urea Nitrogen 5 (L) 6 - 23 mg/dL    Creatinine 0.51 0.50 - 1.05 mg/dL    eGFR >90 >60 mL/min/1.73m*2    Calcium 8.5 (L) 8.6 - 10.3 mg/dL   CBC " "  Result Value Ref Range    WBC 5.8 4.4 - 11.3 x10*3/uL    nRBC 0.0 0.0 - 0.0 /100 WBCs    RBC 3.84 (L) 4.00 - 5.20 x10*6/uL    Hemoglobin 12.3 12.0 - 16.0 g/dL    Hematocrit 36.9 36.0 - 46.0 %    MCV 96 80 - 100 fL    MCH 32.0 26.0 - 34.0 pg    MCHC 33.3 32.0 - 36.0 g/dL    RDW 13.4 11.5 - 14.5 %    Platelets 186 150 - 450 x10*3/uL     XR chest 2 views    Result Date: 9/4/2024  Interpreted By:  Riaz Jean, STUDY: XR CHEST 2 VIEWS;  9/4/2024 2:06 pm   INDICATION: Signs/Symptoms:P/OP.   ,D15.1 Benign neoplasm of heart (HHS-HCC)   COMPARISON: Exam dated 08/05/2024   ACCESSION NUMBER(S): [id number]   ORDERING CLINICIAN: TRINI VIZCARRA   FINDINGS: PA and lateral radiographs of the chest were provided.   Possible pacing wire projects over the cardiac silhouette.   CARDIOMEDIASTINAL SILHOUETTE: Cardiomediastinal silhouette is normal in size and configuration.   LUNGS: Lungs are clear.   ABDOMEN: No remarkable upper abdominal findings.   BONES: No acute osseous changes.       1.  No evidence of acute cardiopulmonary process.       MACRO: None   Signed by: Riaz Jean 9/4/2024 3:49 PM Dictation workstation:   [id number]         Assessment/Plan   Assessment & Plan  Malignant neoplasm of transverse colon (Multi)    Colon cancer (Multi)    Maribel Lakhani is a 63 yo female with hx of colon cancer who is POD #2 s/p extended right colectomy with ileocolic anastomosis. Intra-operative findings showed \" Hepatic flexure colon mass intraoperatively, very redundant transverse colon consistent with preop imaging. Calcification versus growth at ligament of Treitz which was photographed and will need endoscopic workup.\"     Neuro:  Hx: anxiety, depression, insomnia   Acute post-op pain well controlled   - OOB/ ambulate 5x per day, PT/OT   - continue scheduled tylenol, gabapentin  - minimize narcotics as much as possible   - continue home effexor and trazadone        CV: RRR, -120s/60-80s, HR 70s   Hx: HLD, myexoma or heart, " recent Left heart cath and myexoma excision 7/26  - VS every 4 hours   - continue metoprolol       Pulm: NAD, on RA  - IS every hour while awake   - Pulse ox every 4 hours with VS     GI: abdomen mildly distended, soft, appropriately tender, incision closed with Dermabond, C/D/I, edges well approximated, minor bruising without drainage or hematoma.  - continue clears  - Lucian   - chewing gum  - continue IVF until tolerating PO   - PRN antiemetic       : voiding without difficulty   - Monitor I&Os     HEME: DVT Proph   - SCDs/ ambulate/ lovenox       Endo:   - no active issues     ID: afebrile   - Monitor for s/s infection    Dispo: continue clears, if starts to pass gas/BM can consider advancing her diet this afternoon. Pt seen and plan discussed with Dr Cotter    I spent 35 minutes in the professional and overall care of this patient.      Rowena Carter, CARRI-CNP  Continue clears until passing gas then advance diet as tolerated  Likely discharge tomorrow if doing well  4 weeks postop Lovenox injections at discharge

## 2024-09-13 NOTE — CONSULTS
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 female who was recently diagnosed with colon cancer in the transverse colon, presented for laparoscopic right colectomy.  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 Pt stated she feel better after surgery, no BM yet, but still a lot of cramping and flatus. She never had EGD, denies any upper GI symptoms.     Colonoscopy 6/13/24  3 subcentimeter polyps were removed  Single malignant-appearing mass in the mid transverse colon, covering one third of the circumference; performed cold forceps biopsy with partial removal; placed 1 clip successfully; tattooed 3 cm distal to the finding  Diverticulosis in the sigmoid colon         Past Medical History  She has a past medical history of Anxiety and depression, Cardiology follow-up encounter (08/19/2024), Encounter for pre-operative cardiovascular clearance (08/19/2024), H/O transesophageal echocardiography (FABIOLA) for monitoring (08/02/2024), Hemorrhoidal skin tag (08/29/2023), History of cardiac cath (07/26/2024), Hyperlipidemia, Insomnia, Lumbar spondylosis, Malignant neoplasm of transverse colon (Multi), and Myxoma.    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     Social History  She reports that she quit smoking about 1 months ago. Her smoking use included cigarettes. She has a 7.5 pack-year smoking history. She has been exposed to tobacco smoke. She has never used smokeless tobacco. She reports current alcohol use of about 4.0 standard drinks of alcohol per week. She reports that she does not use drugs.    Family History  Family History  "  Problem Relation Name Age of Onset    Lupus Mother      Sjogren's syndrome Mother      Fibromyalgia Mother      Cancer Father JASON Mercado     Hearing loss Father JASON Mercado     Cancer Maternal Grandmother Catia Santa         Allergies  Adhesive tape-silicones, Ampicillin, Cefdinir, and [id number] antimigraine agents    Review of Systems  10 systems reviewed and negative other than HPI     Physical Exam  Physical Exam  Vitals reviewed.   Constitutional:       Appearance: Normal appearance. She is normal weight.   HENT:      Head: Normocephalic and atraumatic.      Nose: Nose normal.      Mouth/Throat:      Pharynx: Oropharynx is clear.   Eyes:      Conjunctiva/sclera: Conjunctivae normal.   Cardiovascular:      Rate and Rhythm: Normal rate and regular rhythm.      Heart sounds: Normal heart sounds.   Pulmonary:      Breath sounds: Normal breath sounds.   Abdominal:      General: Bowel sounds are increased. There is no distension.      Palpations: Abdomen is soft. There is no mass.      Tenderness: There is abdominal tenderness. There is no guarding or rebound.      Hernia: No hernia is present.      Comments: Lap incisions  intact, covered with dermabond   Musculoskeletal:      Cervical back: Neck supple.   Neurological:      Mental Status: She is alert.            Last Recorded Vitals  Blood pressure 122/87, pulse 74, temperature 37.1 °C (98.7 °F), temperature source Temporal, resp. rate 18, height 1.727 m (5' 8\"), weight 64.5 kg (142 lb 3.2 oz), SpO2 95%.    Relevant Results      Scheduled medications  acetaminophen, 975 mg, oral, q6h  enoxaparin, 40 mg, subcutaneous, q24h  gabapentin, 300 mg, oral, TID  metoprolol tartrate, 12.5 mg, oral, BID  pantoprazole, 20 mg, intravenous, q24h NATALIA  traZODone, 100 mg, oral, Daily  venlafaxine XR, 37.5 mg, oral, Daily      Continuous medications  sodium chloride 0.9%, 40 mL/hr, Last Rate: 40 mL/hr (09/12/24 1758)      PRN medications  PRN medications: " benzocaine-menthol, HYDROmorphone, melatonin, methocarbamol, naloxone, oxyCODONE, oxyCODONE, oxygen, promethazine **OR** promethazine  XR chest 2 views    Result Date: 9/4/2024  Interpreted By:  Riaz Jean, STUDY: XR CHEST 2 VIEWS;  9/4/2024 2:06 pm   INDICATION: Signs/Symptoms:P/OP.   ,D15.1 Benign neoplasm of heart (HHS-HCC)   COMPARISON: Exam dated 08/05/2024   ACCESSION NUMBER(S): [id number]   ORDERING CLINICIAN: TRINI VIZCARRA   FINDINGS: PA and lateral radiographs of the chest were provided.   Possible pacing wire projects over the cardiac silhouette.   CARDIOMEDIASTINAL SILHOUETTE: Cardiomediastinal silhouette is normal in size and configuration.   LUNGS: Lungs are clear.   ABDOMEN: No remarkable upper abdominal findings.   BONES: No acute osseous changes.       1.  No evidence of acute cardiopulmonary process.       MACRO: None   Signed by: Riaz Jean 9/4/2024 3:49 PM Dictation workstation:   [id number]   Results for orders placed or performed during the hospital encounter of 09/11/24 (from the past 24 hour(s))   Basic Metabolic Panel   Result Value Ref Range    Glucose 81 74 - 99 mg/dL    Sodium 140 136 - 145 mmol/L    Potassium 3.5 3.5 - 5.3 mmol/L    Chloride 107 98 - 107 mmol/L    Bicarbonate 26 21 - 32 mmol/L    Anion Gap 11 10 - 20 mmol/L    Urea Nitrogen 5 (L) 6 - 23 mg/dL    Creatinine 0.51 0.50 - 1.05 mg/dL    eGFR >90 >60 mL/min/1.73m*2    Calcium 8.5 (L) 8.6 - 10.3 mg/dL   CBC   Result Value Ref Range    WBC 5.8 4.4 - 11.3 x10*3/uL    nRBC 0.0 0.0 - 0.0 /100 WBCs    RBC 3.84 (L) 4.00 - 5.20 x10*6/uL    Hemoglobin 12.3 12.0 - 16.0 g/dL    Hematocrit 36.9 36.0 - 46.0 %    MCV 96 80 - 100 fL    MCH 32.0 26.0 - 34.0 pg    MCHC 33.3 32.0 - 36.0 g/dL    RDW 13.4 11.5 - 14.5 %    Platelets 186 150 - 450 x10*3/uL          Assessment/Plan     64 y.o. female who was recently diagnosed with colon cancer in the transverse colon, presented for laparoscopic right colectomy.  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    D/w Dr Campos  - Consider CT enterography.   - could add on for push enteroscopy on Monday. The location of the tumor may be out of reach of EUS scope  - all other treatment per surgical team      CARRI Ham-CNP

## 2024-09-13 NOTE — CARE PLAN
The patient's goals for the shift include      The clinical goals for the shift include Pt pain will be managed throughout shift and safety maintained    Problem: Fall/Injury  Goal: Be free from injury by end of the shift  Outcome: Progressing     Problem: Pain  Goal: Walks with improved pain control throughout the shift  Outcome: Progressing

## 2024-09-14 LAB
ANION GAP SERPL CALC-SCNC: 16 MMOL/L (ref 10–20)
BUN SERPL-MCNC: 7 MG/DL (ref 6–23)
CALCIUM SERPL-MCNC: 8.9 MG/DL (ref 8.6–10.3)
CHLORIDE SERPL-SCNC: 104 MMOL/L (ref 98–107)
CO2 SERPL-SCNC: 21 MMOL/L (ref 21–32)
CREAT SERPL-MCNC: 0.51 MG/DL (ref 0.5–1.05)
EGFRCR SERPLBLD CKD-EPI 2021: >90 ML/MIN/1.73M*2
ERYTHROCYTE [DISTWIDTH] IN BLOOD BY AUTOMATED COUNT: 13.1 % (ref 11.5–14.5)
GLUCOSE SERPL-MCNC: 81 MG/DL (ref 74–99)
HCT VFR BLD AUTO: 45.6 % (ref 36–46)
HGB BLD-MCNC: 15.2 G/DL (ref 12–16)
MCH RBC QN AUTO: 31.1 PG (ref 26–34)
MCHC RBC AUTO-ENTMCNC: 33.3 G/DL (ref 32–36)
MCV RBC AUTO: 93 FL (ref 80–100)
NRBC BLD-RTO: 0 /100 WBCS (ref 0–0)
PLATELET # BLD AUTO: 264 X10*3/UL (ref 150–450)
POTASSIUM SERPL-SCNC: 3.7 MMOL/L (ref 3.5–5.3)
RBC # BLD AUTO: 4.89 X10*6/UL (ref 4–5.2)
SODIUM SERPL-SCNC: 137 MMOL/L (ref 136–145)
WBC # BLD AUTO: 9.4 X10*3/UL (ref 4.4–11.3)

## 2024-09-14 PROCEDURE — 2500000004 HC RX 250 GENERAL PHARMACY W/ HCPCS (ALT 636 FOR OP/ED): Performed by: SURGERY

## 2024-09-14 PROCEDURE — 85027 COMPLETE CBC AUTOMATED: CPT | Performed by: SURGERY

## 2024-09-14 PROCEDURE — 80048 BASIC METABOLIC PNL TOTAL CA: CPT | Performed by: SURGERY

## 2024-09-14 PROCEDURE — 2500000001 HC RX 250 WO HCPCS SELF ADMINISTERED DRUGS (ALT 637 FOR MEDICARE OP): Performed by: NURSE PRACTITIONER

## 2024-09-14 PROCEDURE — 36415 COLL VENOUS BLD VENIPUNCTURE: CPT | Performed by: SURGERY

## 2024-09-14 PROCEDURE — 1100000001 HC PRIVATE ROOM DAILY

## 2024-09-14 PROCEDURE — 99232 SBSQ HOSP IP/OBS MODERATE 35: CPT | Performed by: INTERNAL MEDICINE

## 2024-09-14 PROCEDURE — 2500000001 HC RX 250 WO HCPCS SELF ADMINISTERED DRUGS (ALT 637 FOR MEDICARE OP): Performed by: SURGERY

## 2024-09-14 PROCEDURE — 99232 SBSQ HOSP IP/OBS MODERATE 35: CPT

## 2024-09-14 ASSESSMENT — COGNITIVE AND FUNCTIONAL STATUS - GENERAL
DRESSING REGULAR LOWER BODY CLOTHING: A LITTLE
MOBILITY SCORE: 20
STANDING UP FROM CHAIR USING ARMS: A LITTLE
MOBILITY SCORE: 20
DRESSING REGULAR LOWER BODY CLOTHING: A LITTLE
CLIMB 3 TO 5 STEPS WITH RAILING: A LITTLE
DRESSING REGULAR UPPER BODY CLOTHING: A LITTLE
STANDING UP FROM CHAIR USING ARMS: A LITTLE
WALKING IN HOSPITAL ROOM: A LITTLE
DAILY ACTIVITIY SCORE: 22
DRESSING REGULAR UPPER BODY CLOTHING: A LITTLE
WALKING IN HOSPITAL ROOM: A LITTLE
MOVING TO AND FROM BED TO CHAIR: A LITTLE
MOVING TO AND FROM BED TO CHAIR: A LITTLE
DAILY ACTIVITIY SCORE: 22
CLIMB 3 TO 5 STEPS WITH RAILING: A LITTLE

## 2024-09-14 ASSESSMENT — PAIN DESCRIPTION - LOCATION: LOCATION: ABDOMEN

## 2024-09-14 ASSESSMENT — PAIN SCALES - GENERAL
PAINLEVEL_OUTOF10: 3
PAINLEVEL_OUTOF10: 6
PAINLEVEL_OUTOF10: 8

## 2024-09-14 ASSESSMENT — PAIN - FUNCTIONAL ASSESSMENT: PAIN_FUNCTIONAL_ASSESSMENT: 0-10

## 2024-09-14 NOTE — PROGRESS NOTES
"Gastroenterology Progress Note    ASSESSMENT and PLAN:     64-year-old female who was recently diagnosed with transverse colon cancer who was admitted for laparoscopic right extended Wesley colectomy.  Intraoperatively, a tumor was noted at the ligament of Treitz/duodenal jejunal junction.      Of note, patient has history of left atrial myxoma     GI consultation was requested for further workup     -Patient will benefit from an push enteroscopy to take biopsies.  This can be done as an inpatient versus outpatient.  Patient lives in [place] and would want to get this done as an inpatient if she can.   -Push enteroscopy on Monday, if patient stays  -EUS scope will likely not result reach the ligament of Treitz       Jessica Campos MD, MS  9/14/2024      SUBJECTIVE:     Patient seems to be recovering well from the surgery.  She was seen walking on the floor    OBJECTIVE:     Last Recorded Vitals:  /81 (BP Location: Right arm, Patient Position: Lying)   Pulse 78   Temp 37.1 °C (98.7 °F) (Temporal)   Resp 18   Ht 1.727 m (5' 8\")   Wt 64 kg (141 lb 1.5 oz)   LMP  (LMP Unknown)   SpO2 94%   BMI 21.45 kg/m²   Physical Exam:  Abdomen: soft, mildly tender, no guarding/rigidity    I have personally reviewed the relevant labs, imaging, medications and pathology    Jessica Campos MD, MS  9/14/2024    "

## 2024-09-14 NOTE — CARE PLAN
The patient's goals for the shift include      The clinical goals for the shift include Patient will remain comfortable throughout the shift.    Over the shift, the patient did not make progress toward the following goals. Barriers to progression include post op day 3 right colectomy with anastomosis. Recommendations to address these barriers include encourage ambulation and pain meds as needed.     No

## 2024-09-14 NOTE — PROGRESS NOTES
"Maribel Lakhani is a 64 y.o. female on day 3 of admission presenting with Malignant neoplasm of transverse colon (Multi). Now POD#3 s/p Laparoscopic Extended Right Colectomy; Ileocolic Anastomosis.      Subjective   Patient states around 3am she felt a little flushed and had some nausea but it passed quickly, feels better this morning, passing some flatus.        Objective   PE:  Constitutional: A&Ox3, calm and cooperative  Eyes: , clear sclera   ENMT: Moist mucous membranes  Head/Neck: Neck supple  Cardiovascular: Normal rate and regular rhythm.  Respiratory/Thorax: Good symmetric chest expansion.   Gastrointestinal: Abdomen distended but improved, lower abdomen much softer, appropriately tender, no peritoneal signs, laparotomy sites c/d/i, well approximate with Dermabond, no erythema or drainage    Genitourinary: Voiding independently   Musculoskeletal: ROM intact  Extremities: No peripheral edema  Neurological: A&Ox3  Psychological: Appropriate mood and behavior  Skin: Warm and dry    Last Recorded Vitals  Blood pressure 110/80, pulse 96, temperature 37.4 °C (99.3 °F), temperature source Temporal, resp. rate 18, height 1.727 m (5' 8\"), weight 64 kg (141 lb 1.5 oz), SpO2 95%.  Intake/Output last 3 Shifts:  I/O last 3 completed shifts:  In: 1160 (18.1 mL/kg) [P.O.:360; I.V.:800 (12.5 mL/kg)]  Out: 1325 (20.7 mL/kg) [Urine:1325 (0.6 mL/kg/hr)]  Weight: 64 kg     Relevant Results  Results for orders placed or performed during the hospital encounter of 09/11/24 (from the past 24 hour(s))   Basic Metabolic Panel   Result Value Ref Range    Glucose 81 74 - 99 mg/dL    Sodium 137 136 - 145 mmol/L    Potassium 3.7 3.5 - 5.3 mmol/L    Chloride 104 98 - 107 mmol/L    Bicarbonate 21 21 - 32 mmol/L    Anion Gap 16 10 - 20 mmol/L    Urea Nitrogen 7 6 - 23 mg/dL    Creatinine 0.51 0.50 - 1.05 mg/dL    eGFR >90 >60 mL/min/1.73m*2    Calcium 8.9 8.6 - 10.3 mg/dL   CBC   Result Value Ref Range    WBC 9.4 4.4 - 11.3 x10*3/uL    nRBC " 0.0 0.0 - 0.0 /100 WBCs    RBC 4.89 4.00 - 5.20 x10*6/uL    Hemoglobin 15.2 12.0 - 16.0 g/dL    Hematocrit 45.6 36.0 - 46.0 %    MCV 93 80 - 100 fL    MCH 31.1 26.0 - 34.0 pg    MCHC 33.3 32.0 - 36.0 g/dL    RDW 13.1 11.5 - 14.5 %    Platelets 264 150 - 450 x10*3/uL     No orders to display          Assessment/Plan   Assessment & Plan  Malignant neoplasm of transverse colon (Multi)    Colon cancer (Multi)    Maribel Lakhani is a 64 y.o. female on day 3 of admission presenting with Malignant neoplasm of transverse colon (Multi). Now POD#3 s/p Laparoscopic Extended Right Colectomy; Ileocolic Anastomosis.      GI: POD#3  Hx: Colon cancer, malignant neoplasm of transverse colon  - advanced to FLD, discontinue IVF  - ERAS protocol- nutritional supplements   - Chewing gum   - Nutrition Consulted   - OOB/Up to chairs for meals  - Ambulate in spicer 5x day  - Antiemetics as needed    Neuro: Acute post operative pain  Hx: anxiety, MDD  - continue current pain regimen     ---> PRN narcotics, minimize   - continue trazadone, venlafaxine   - 30 Day MME 7.00    Cardiac: RRR  Hx: HLD  - continue home Metoprolol     Respiratory: on RA, no increased WOB  - Encourage IS Q1 Hour  - Cough, deep breath    RENAL: creatinine 0.51, voiding   Voiding adequate amounts; lytes unremarkable  - Monitor and treat as needed  - repeat BMP in the AM    HEME: h/h 15.2/45.6  -ERIC/SCD/Lovenox  - IV Iron while inpatient    MSK  Hx: lumbar spondylosis   - PT/OT to evaluate and treat    ID: WBC 9.4, Afebrile   -Monitor for s/sx of infections   - s/p post op antibiotics  - repeat CBC in the AM    Dispo: Adv to FLD, OOB, awaiting RBF. Discussed with Dr. Hawkins, no need for CT enterography at this time, will plan to do outpatient for SB mass.     I spent 35 minutes in the professional and overall care of this patient.      Estela Collier, APRN-CNP

## 2024-09-15 VITALS
RESPIRATION RATE: 16 BRPM | DIASTOLIC BLOOD PRESSURE: 63 MMHG | TEMPERATURE: 97.5 F | BODY MASS INDEX: 21.58 KG/M2 | HEART RATE: 73 BPM | HEIGHT: 68 IN | WEIGHT: 142.42 LBS | OXYGEN SATURATION: 98 % | SYSTOLIC BLOOD PRESSURE: 101 MMHG

## 2024-09-15 LAB
ANION GAP SERPL CALC-SCNC: 14 MMOL/L (ref 10–20)
BUN SERPL-MCNC: 12 MG/DL (ref 6–23)
CALCIUM SERPL-MCNC: 8.5 MG/DL (ref 8.6–10.3)
CHLORIDE SERPL-SCNC: 102 MMOL/L (ref 98–107)
CO2 SERPL-SCNC: 22 MMOL/L (ref 21–32)
CREAT SERPL-MCNC: 0.56 MG/DL (ref 0.5–1.05)
EGFRCR SERPLBLD CKD-EPI 2021: >90 ML/MIN/1.73M*2
ERYTHROCYTE [DISTWIDTH] IN BLOOD BY AUTOMATED COUNT: 13.1 % (ref 11.5–14.5)
GLUCOSE SERPL-MCNC: 118 MG/DL (ref 74–99)
HCT VFR BLD AUTO: 39.2 % (ref 36–46)
HGB BLD-MCNC: 13.2 G/DL (ref 12–16)
MCH RBC QN AUTO: 31.7 PG (ref 26–34)
MCHC RBC AUTO-ENTMCNC: 33.7 G/DL (ref 32–36)
MCV RBC AUTO: 94 FL (ref 80–100)
NRBC BLD-RTO: 0 /100 WBCS (ref 0–0)
PLATELET # BLD AUTO: 247 X10*3/UL (ref 150–450)
POTASSIUM SERPL-SCNC: 3.7 MMOL/L (ref 3.5–5.3)
RBC # BLD AUTO: 4.16 X10*6/UL (ref 4–5.2)
SODIUM SERPL-SCNC: 134 MMOL/L (ref 136–145)
WBC # BLD AUTO: 6.1 X10*3/UL (ref 4.4–11.3)

## 2024-09-15 PROCEDURE — 80048 BASIC METABOLIC PNL TOTAL CA: CPT | Performed by: NURSE PRACTITIONER

## 2024-09-15 PROCEDURE — 99232 SBSQ HOSP IP/OBS MODERATE 35: CPT | Performed by: INTERNAL MEDICINE

## 2024-09-15 PROCEDURE — 2500000004 HC RX 250 GENERAL PHARMACY W/ HCPCS (ALT 636 FOR OP/ED): Performed by: SURGERY

## 2024-09-15 PROCEDURE — 85027 COMPLETE CBC AUTOMATED: CPT | Performed by: NURSE PRACTITIONER

## 2024-09-15 PROCEDURE — 36415 COLL VENOUS BLD VENIPUNCTURE: CPT | Performed by: NURSE PRACTITIONER

## 2024-09-15 PROCEDURE — 99231 SBSQ HOSP IP/OBS SF/LOW 25: CPT | Performed by: NURSE PRACTITIONER

## 2024-09-15 PROCEDURE — 2500000001 HC RX 250 WO HCPCS SELF ADMINISTERED DRUGS (ALT 637 FOR MEDICARE OP): Performed by: SURGERY

## 2024-09-15 PROCEDURE — 1100000001 HC PRIVATE ROOM DAILY

## 2024-09-15 ASSESSMENT — COGNITIVE AND FUNCTIONAL STATUS - GENERAL
DAILY ACTIVITIY SCORE: 22
DRESSING REGULAR UPPER BODY CLOTHING: A LITTLE
DRESSING REGULAR LOWER BODY CLOTHING: A LITTLE
WALKING IN HOSPITAL ROOM: A LITTLE
CLIMB 3 TO 5 STEPS WITH RAILING: A LITTLE
MOBILITY SCORE: 22

## 2024-09-15 ASSESSMENT — PAIN - FUNCTIONAL ASSESSMENT
PAIN_FUNCTIONAL_ASSESSMENT: 0-10

## 2024-09-15 ASSESSMENT — PAIN SCALES - GENERAL
PAINLEVEL_OUTOF10: 3
PAINLEVEL_OUTOF10: 4
PAINLEVEL_OUTOF10: 5 - MODERATE PAIN
PAINLEVEL_OUTOF10: 2
PAINLEVEL_OUTOF10: 3

## 2024-09-15 ASSESSMENT — PAIN DESCRIPTION - LOCATION: LOCATION: ABDOMEN

## 2024-09-15 NOTE — PROGRESS NOTES
"Maribel Lakhani is a 64 y.o. female on day 4 of admission presenting with Malignant neoplasm of transverse colon (Multi).    Subjective   Had severe nasuea last night but RN never came in with medication. Nausea subsided without any intervention, she did not vomit. Tolerating a little breakfast this morning. Passing gas but denies having a BM. She has been up and walking in the halls. Denies n/v, fever, chills, CP and SOB. Afebrile, VSS       Objective     Physical Exam  Constitutional:       Appearance: Normal appearance.   Cardiovascular:      Rate and Rhythm: Normal rate and regular rhythm.   Pulmonary:      Effort: Pulmonary effort is normal.   Abdominal:      Comments: Appropriately tender, moderately distended, soft. Incision closed with Dermabond, C/D/I, edges well approximated, minor bruising without drainage or hematoma.     Genitourinary:     Comments: Voiding without difficulty   Musculoskeletal:         General: Normal range of motion.   Skin:     General: Skin is warm and dry.   Neurological:      Mental Status: She is oriented to person, place, and time.   Psychiatric:         Mood and Affect: Mood normal.         Behavior: Behavior normal.         Last Recorded Vitals  Blood pressure 105/71, pulse 85, temperature 37.1 °C (98.8 °F), resp. rate 16, height 1.727 m (5' 8\"), weight 64.6 kg (142 lb 6.7 oz), SpO2 95%.  Intake/Output last 3 Shifts:  I/O last 3 completed shifts:  In: - (0 mL/kg)   Out: 525 (8.1 mL/kg) [Urine:525 (0.2 mL/kg/hr)]  Weight: 64.6 kg     Relevant Results  No results found for this or any previous visit (from the past 24 hour(s)).      XR chest 2 views    Result Date: 9/4/2024  Interpreted By:  Riaz Jean, STUDY: XR CHEST 2 VIEWS;  9/4/2024 2:06 pm   INDICATION: Signs/Symptoms:P/OP.   ,D15.1 Benign neoplasm of heart (HHS-HCC)   COMPARISON: Exam dated 08/05/2024   ACCESSION NUMBER(S): [id number]   ORDERING CLINICIAN: TRINI VIZCARRA   FINDINGS: PA and lateral radiographs of the chest " "were provided.   Possible pacing wire projects over the cardiac silhouette.   CARDIOMEDIASTINAL SILHOUETTE: Cardiomediastinal silhouette is normal in size and configuration.   LUNGS: Lungs are clear.   ABDOMEN: No remarkable upper abdominal findings.   BONES: No acute osseous changes.       1.  No evidence of acute cardiopulmonary process.       MACRO: None   Signed by: Riaz Jean 9/4/2024 3:49 PM Dictation workstation:   [id number]         Assessment/Plan   Assessment & Plan  Malignant neoplasm of transverse colon (Multi)    Colon cancer (Multi)    Maribel Lakhani is a 65 yo female with hx of colon cancer who is POD #4 s/p extended right colectomy with ileocolic anastomosis. Intra-operative findings showed \" Hepatic flexure colon mass intraoperatively, very redundant transverse colon consistent with preop imaging. Calcification versus growth at ligament of Treitz which was photographed and will need endoscopic workup.\"     Neuro:  Hx: anxiety, depression, insomnia   Acute post-op pain well controlled   - OOB/ ambulate 5x per day, PT/OT   - continue scheduled tylenol, gabapentin  - minimize narcotics as much as possible   - continue home effexor and trazadone        CV: RRR, VSS  Hx: HLD, myexoma or heart, recent Left heart cath and myexoma excision 7/26  - VS every 4 hours   - continue metoprolol       Pulm: NAD, on RA  - IS every hour while awake   - Pulse ox every 4 hours with VS     GI: abdomen moderately distended, soft, appropriately tender, incision closed with Dermabond, C/D/I, edges well approximated, minor bruising without drainage or hematoma.  - continue low fiber diet- go slow   - Lucian   - chewing gum  - continue IVF until tolerating PO   - PRN antiemetic       : voiding without difficulty   - Monitor I&Os     HEME: DVT Proph   - SCDs/ ambulate/ lovenox       Endo:   - no active issues     ID: afebrile   - Monitor for s/s infection    Dispo: continue small bites of food, labs ordered, awaiting RBF. " Plan discussed with Dr Hawkins.     I spent 35 minutes in the professional and overall care of this patient.      Rowena Carter, CARRI-CNP

## 2024-09-15 NOTE — PROGRESS NOTES
"Gastroenterology Progress Note    ASSESSMENT and PLAN:     64-year-old female who was recently diagnosed with transverse colon cancer who was admitted for laparoscopic right extended Wesley colectomy.  Intraoperatively, a tumor was noted at the ligament of Treitz/duodenal jejunal junction.      Of note, patient has history of left atrial myxoma     GI consultation was requested for further workup     -Patient will benefit from an push enteroscopy to take biopsies.  This can be done as an inpatient versus outpatient.  Patient lives in [place] and would want to get this done as an inpatient if she can.   -Push enteroscopy on Monday,  - NPO after MN       Jessica Campos MD, MS  9/15/2024      SUBJECTIVE:     Patient seems to be recovering well from the surgery.  She was seen walking on the floor    OBJECTIVE:     Last Recorded Vitals:  /71   Pulse 85   Temp 37.1 °C (98.8 °F)   Resp 16   Ht 1.727 m (5' 8\")   Wt 64.6 kg (142 lb 6.7 oz)   LMP  (LMP Unknown)   SpO2 95%   BMI 21.65 kg/m²   Physical Exam:  Abdomen: soft, mildly tender, no guarding/rigidity    I have personally reviewed the relevant labs, imaging, medications and pathology    Jessica Campos MD, MS  9/15/2024    "

## 2024-09-15 NOTE — CARE PLAN
The patient's goals for the shift include      The clinical goals for the shift include patient will remain hds, safe and with manged pain

## 2024-09-15 NOTE — CARE PLAN
The patient's goals for the shift include      The clinical goals for the shift include pt will ambulate in spicer    Over the shift, the patient did not make progress toward the following goals. Barriers to progression include post op. Recommendations to address these barriers include ***.

## 2024-09-16 ENCOUNTER — ANESTHESIA (OUTPATIENT)
Dept: GASTROENTEROLOGY | Facility: HOSPITAL | Age: 64
DRG: 330 | End: 2024-09-16
Payer: COMMERCIAL

## 2024-09-16 ENCOUNTER — ANESTHESIA EVENT (OUTPATIENT)
Dept: GASTROENTEROLOGY | Facility: HOSPITAL | Age: 64
DRG: 330 | End: 2024-09-16
Payer: COMMERCIAL

## 2024-09-16 ENCOUNTER — APPOINTMENT (OUTPATIENT)
Dept: GASTROENTEROLOGY | Facility: HOSPITAL | Age: 64
DRG: 330 | End: 2024-09-16
Payer: COMMERCIAL

## 2024-09-16 VITALS
HEIGHT: 68 IN | HEART RATE: 74 BPM | WEIGHT: 142.42 LBS | SYSTOLIC BLOOD PRESSURE: 118 MMHG | BODY MASS INDEX: 21.58 KG/M2 | TEMPERATURE: 98.1 F | DIASTOLIC BLOOD PRESSURE: 82 MMHG | OXYGEN SATURATION: 96 % | RESPIRATION RATE: 16 BRPM

## 2024-09-16 LAB
ANION GAP SERPL CALC-SCNC: 11 MMOL/L (ref 10–20)
BUN SERPL-MCNC: 13 MG/DL (ref 6–23)
CALCIUM SERPL-MCNC: 8.1 MG/DL (ref 8.6–10.3)
CHLORIDE SERPL-SCNC: 107 MMOL/L (ref 98–107)
CO2 SERPL-SCNC: 26 MMOL/L (ref 21–32)
CREAT SERPL-MCNC: 0.4 MG/DL (ref 0.5–1.05)
EGFRCR SERPLBLD CKD-EPI 2021: >90 ML/MIN/1.73M*2
ERYTHROCYTE [DISTWIDTH] IN BLOOD BY AUTOMATED COUNT: 13.2 % (ref 11.5–14.5)
GLUCOSE SERPL-MCNC: 90 MG/DL (ref 74–99)
HCT VFR BLD AUTO: 33.5 % (ref 36–46)
HGB BLD-MCNC: 11.1 G/DL (ref 12–16)
MCH RBC QN AUTO: 31.4 PG (ref 26–34)
MCHC RBC AUTO-ENTMCNC: 33.1 G/DL (ref 32–36)
MCV RBC AUTO: 95 FL (ref 80–100)
NRBC BLD-RTO: 0 /100 WBCS (ref 0–0)
PLATELET # BLD AUTO: 197 X10*3/UL (ref 150–450)
POTASSIUM SERPL-SCNC: 3.5 MMOL/L (ref 3.5–5.3)
RBC # BLD AUTO: 3.53 X10*6/UL (ref 4–5.2)
SODIUM SERPL-SCNC: 140 MMOL/L (ref 136–145)
WBC # BLD AUTO: 5.1 X10*3/UL (ref 4.4–11.3)

## 2024-09-16 PROCEDURE — 0W3P8ZZ CONTROL BLEEDING IN GASTROINTESTINAL TRACT, VIA NATURAL OR ARTIFICIAL OPENING ENDOSCOPIC: ICD-10-PCS | Performed by: STUDENT IN AN ORGANIZED HEALTH CARE EDUCATION/TRAINING PROGRAM

## 2024-09-16 PROCEDURE — 0DB78ZX EXCISION OF STOMACH, PYLORUS, VIA NATURAL OR ARTIFICIAL OPENING ENDOSCOPIC, DIAGNOSTIC: ICD-10-PCS | Performed by: STUDENT IN AN ORGANIZED HEALTH CARE EDUCATION/TRAINING PROGRAM

## 2024-09-16 PROCEDURE — 7100000010 HC PHASE TWO TIME - EACH INCREMENTAL 1 MINUTE

## 2024-09-16 PROCEDURE — 36415 COLL VENOUS BLD VENIPUNCTURE: CPT

## 2024-09-16 PROCEDURE — 2720000007 HC OR 272 NO HCPCS

## 2024-09-16 PROCEDURE — 2500000001 HC RX 250 WO HCPCS SELF ADMINISTERED DRUGS (ALT 637 FOR MEDICARE OP): Performed by: SURGERY

## 2024-09-16 PROCEDURE — A44361 PR SMALL BOWEL ENDOSCOPY,BIOPSY: Performed by: ANESTHESIOLOGY

## 2024-09-16 PROCEDURE — A44361 PR SMALL BOWEL ENDOSCOPY,BIOPSY

## 2024-09-16 PROCEDURE — 2500000004 HC RX 250 GENERAL PHARMACY W/ HCPCS (ALT 636 FOR OP/ED)

## 2024-09-16 PROCEDURE — 3700000002 HC GENERAL ANESTHESIA TIME - EACH INCREMENTAL 1 MINUTE

## 2024-09-16 PROCEDURE — 99238 HOSP IP/OBS DSCHRG MGMT 30/<: CPT | Performed by: NURSE PRACTITIONER

## 2024-09-16 PROCEDURE — 85027 COMPLETE CBC AUTOMATED: CPT

## 2024-09-16 PROCEDURE — 44361 SMALL BOWEL ENDOSCOPY/BIOPSY: CPT | Performed by: STUDENT IN AN ORGANIZED HEALTH CARE EDUCATION/TRAINING PROGRAM

## 2024-09-16 PROCEDURE — 80048 BASIC METABOLIC PNL TOTAL CA: CPT

## 2024-09-16 PROCEDURE — 7100000009 HC PHASE TWO TIME - INITIAL BASE CHARGE

## 2024-09-16 PROCEDURE — 2500000001 HC RX 250 WO HCPCS SELF ADMINISTERED DRUGS (ALT 637 FOR MEDICARE OP)

## 2024-09-16 PROCEDURE — 97116 GAIT TRAINING THERAPY: CPT | Mod: GP,CQ

## 2024-09-16 PROCEDURE — 97530 THERAPEUTIC ACTIVITIES: CPT | Mod: GP,CQ

## 2024-09-16 PROCEDURE — 3700000001 HC GENERAL ANESTHESIA TIME - INITIAL BASE CHARGE

## 2024-09-16 PROCEDURE — 2500000005 HC RX 250 GENERAL PHARMACY W/O HCPCS

## 2024-09-16 PROCEDURE — 88305 TISSUE EXAM BY PATHOLOGIST: CPT | Mod: TC,AHULAB | Performed by: STUDENT IN AN ORGANIZED HEALTH CARE EDUCATION/TRAINING PROGRAM

## 2024-09-16 RX ORDER — TOPICAL ANESTHETIC 200 MG/ML
SPRAY DENTAL; PERIODONTAL AS NEEDED
Status: DISCONTINUED | OUTPATIENT
Start: 2024-09-16 | End: 2024-09-16

## 2024-09-16 RX ORDER — OXYCODONE HYDROCHLORIDE 5 MG/1
5 TABLET ORAL EVERY 4 HOURS PRN
Qty: 15 TABLET | Refills: 0 | Status: SHIPPED | OUTPATIENT
Start: 2024-09-16

## 2024-09-16 RX ORDER — LIDOCAINE HYDROCHLORIDE 20 MG/ML
INJECTION, SOLUTION EPIDURAL; INFILTRATION; INTRACAUDAL; PERINEURAL AS NEEDED
Status: DISCONTINUED | OUTPATIENT
Start: 2024-09-16 | End: 2024-09-16

## 2024-09-16 RX ORDER — MIDAZOLAM HYDROCHLORIDE 1 MG/ML
INJECTION INTRAMUSCULAR; INTRAVENOUS AS NEEDED
Status: DISCONTINUED | OUTPATIENT
Start: 2024-09-16 | End: 2024-09-16

## 2024-09-16 RX ORDER — ONDANSETRON 4 MG/1
4 TABLET, ORALLY DISINTEGRATING ORAL EVERY 8 HOURS PRN
Qty: 20 TABLET | Refills: 0 | Status: SHIPPED | OUTPATIENT
Start: 2024-09-16

## 2024-09-16 RX ORDER — PROPOFOL 10 MG/ML
INJECTION, EMULSION INTRAVENOUS AS NEEDED
Status: DISCONTINUED | OUTPATIENT
Start: 2024-09-16 | End: 2024-09-16

## 2024-09-16 RX ORDER — SODIUM CHLORIDE, SODIUM LACTATE, POTASSIUM CHLORIDE, CALCIUM CHLORIDE 600; 310; 30; 20 MG/100ML; MG/100ML; MG/100ML; MG/100ML
INJECTION, SOLUTION INTRAVENOUS CONTINUOUS PRN
Status: DISCONTINUED | OUTPATIENT
Start: 2024-09-16 | End: 2024-09-16

## 2024-09-16 SDOH — HEALTH STABILITY: MENTAL HEALTH: CURRENT SMOKER: 0

## 2024-09-16 ASSESSMENT — PAIN SCALES - GENERAL
PAINLEVEL_OUTOF10: 0 - NO PAIN
PAINLEVEL_OUTOF10: 2
PAINLEVEL_OUTOF10: 3
PAINLEVEL_OUTOF10: 2
PAINLEVEL_OUTOF10: 0 - NO PAIN
PAINLEVEL_OUTOF10: 2
PAINLEVEL_OUTOF10: 2
PAINLEVEL_OUTOF10: 0 - NO PAIN

## 2024-09-16 ASSESSMENT — PAIN - FUNCTIONAL ASSESSMENT
PAIN_FUNCTIONAL_ASSESSMENT: 0-10

## 2024-09-16 ASSESSMENT — COLUMBIA-SUICIDE SEVERITY RATING SCALE - C-SSRS
6. HAVE YOU EVER DONE ANYTHING, STARTED TO DO ANYTHING, OR PREPARED TO DO ANYTHING TO END YOUR LIFE?: NO
2. HAVE YOU ACTUALLY HAD ANY THOUGHTS OF KILLING YOURSELF?: NO
1. IN THE PAST MONTH, HAVE YOU WISHED YOU WERE DEAD OR WISHED YOU COULD GO TO SLEEP AND NOT WAKE UP?: NO

## 2024-09-16 ASSESSMENT — COGNITIVE AND FUNCTIONAL STATUS - GENERAL
CLIMB 3 TO 5 STEPS WITH RAILING: A LITTLE
MOVING TO AND FROM BED TO CHAIR: A LITTLE
STANDING UP FROM CHAIR USING ARMS: A LITTLE
WALKING IN HOSPITAL ROOM: A LITTLE
MOBILITY SCORE: 20

## 2024-09-16 ASSESSMENT — PAIN DESCRIPTION - LOCATION: LOCATION: ABDOMEN

## 2024-09-16 NOTE — DISCHARGE INSTRUCTIONS
.Instructions After Laparoscopic Surgery      Wound Care:          -Ice packs to wounds every hour the first day  -Ok to shower in 24 hours  -no baths or swimming for 2 weeks  -keep wound clean and dry  -do not apply topical creams or ointments  -call if you notice redness around wound, foul-smelliing drainage, or increasing pain   Diet:          - GI soft/ low fiber as discussed with Dietary          - Impact Advance Shakes 3 times a day for 5 days    Activity          -Take it easy for the first 48 hours          -stairs and walks are fine          -resume activities gradually over the first week          -ask Dr Hawkins before resuming strenuous physical exertions          -No driving while on pain medication  Medications          -Pain medicine prescription attached for severe pain          -Please take Motrin/ Ibuprofen 600 mg every 6 hours scheduled for 2-3 days, then every 6 hours as needed           -Please take Tylenol 625 mg every 6 hours scheduled for 2-3 days, then every 6 hours as needed          -Resume your home medications unless otherwise directed          - Oxycodone 5mg every 6 hours as needed for pain  Other Instructions          -Call to make appointment within 1-2 days: [phone]          -Call the doctor for the following:   severe unrelieved pain   fevers > 101F   Nausea/vomiting   wound issues   insurance/return to work forms   shortness of breath   chest pains    Other Instructions:  It is important to drink adequate fluid and avoid dehydration. Signs of dehydration include dark urine, decreased frequency in urine, pale mucous membrane, or dry mucous membranes. You should drink at least 8 8oz glasses of fluids a day and it should be a variety of fluids (water, juice, tea, coffee, etc.).  If you start to experience nausea, emesis, fever, or abdominal pain please call Dr. Hawkins's office [phone].

## 2024-09-16 NOTE — PROGRESS NOTES
09/16/24 0833   Discharge Planning   Home or Post Acute Services None   Expected Discharge Disposition Home     PLAN/BARRIER: push enteroscopy for biopsies today, RBF  DISP: home  Patient plans to discharge home with no homecare needs when medically cleared.  Jayshree Stewart RN

## 2024-09-16 NOTE — ANESTHESIA POSTPROCEDURE EVALUATION
Patient: Maribel Lakhani    Procedure Summary       Date: 09/16/24 Room / Location: [facility]    Anesthesia Start: 1017 Anesthesia Stop: 1047    Procedure: ENTEROSCOPY Diagnosis: Neoplasm of uncertain behavior of jejunum    Scheduled Providers: Shazia Pierce MD; Mary Villalobos MD Responsible Provider: Mary Villalobos MD    Anesthesia Type: MAC ASA Status: 3            Anesthesia Type: MAC    Vitals Value Taken Time   /76 09/16/24 1100   Temp 36.6 °C (97.9 °F) 09/16/24 1047   Pulse 74 09/16/24 1105   Resp 14 09/16/24 1058   SpO2 96 % 09/16/24 1105   Vitals shown include unfiled device data.    Anesthesia Post Evaluation    Patient location during evaluation: PACU  Patient participation: complete - patient participated  Level of consciousness: awake and alert  Pain management: adequate  Airway patency: patent  Cardiovascular status: hemodynamically stable  Respiratory status: spontaneous ventilation, nonlabored ventilation and unassisted  Hydration status: euvolemic  Postoperative Nausea and Vomiting: none        No notable events documented.

## 2024-09-16 NOTE — NURSING NOTE

## 2024-09-16 NOTE — CARE PLAN
Patient is status post enteroscopy for evaluation of suspected small bowel mass seen intraoperatively.  See media tab for full report    There was a small antral ulcer.  Biopsies were taken for H. pylori.  Small bowel was carefully examined up to 110 cm (this is an approximation given looping)  with no evidence of mucosal lesion.  1 Hemoclip was placed at the distal extent of exam.  Approximately 10 cm of mucosa was examined distal to the clip again with no evidence of mucosal mass.    Recommendations  -Resume diet  -Recommend outpatient CT enterography for further evaluation of possible small bowel mass. This was ordered   -Further workup based on review of imaging    Discussed with patient and .     No barriers to discharge from GI perspective. GI to stop actively following.  Please reconsult as indicated   psychology

## 2024-09-16 NOTE — ANESTHESIA PREPROCEDURE EVALUATION
Patient: Maribel Lakhani    Procedure Information       Date/Time: 09/16/24 0925    Scheduled providers: Shazia Pierce MD; Mary Villalobos MD    Procedure: ENTEROSCOPY    Location: [facility]                                                                                           Pre-Anesthesia Evaluation    Maribel Lakhani is a 64 y.o. female presents for the above mentioned procedure. She is admitted to the hospital for Malignant neoplasm of transverse colon (Multi) [C18.4];Colon cancer (Multi) [C18.9]. Today is Hospital Day: 6  Past Medical History:   Diagnosis Date    Anxiety and depression     H/O transesophageal echocardiography (FABIOLA) for monitoring 08/02/2024    CONCLUSIONS:  1. The left ventricular systolic function is normal, with a visually estimated ejection fraction of 65%.  2. Left ventricular diastolic filling was indeterminate.  3. Left ventricular cavity size is decreased.  4. There is normal right ventricular global systolic function.  5. Aortic valve stenosis is not present.  6. Mild aortic valve regurgitation.    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 Her smoking use included cigarettes. She has a 7.5 pack-year smoking history. She has been exposed to tobacco  smoke. She has never used smokeless tobacco. She reports current alcohol use of about 7.0 standard drinks of alcohol per week. She reports that she does not use drugs.  Allergies   Adhesive tape-silicones, Ampicillin, Cefdinir, and [id number] antimigraine agents  Home medications  Current Outpatient Medications   Medication Instructions    acetaminophen (TYLENOL) 650 mg, oral, Every 6 hours PRN    chlorhexidine (Peridex) 0.12 % solution 15 mL, Mouth/Throat, 2 times daily, Use night before surgery and morning of surgery Swish and spit    ezetimibe (ZETIA) 10 mg, oral, Daily    gabapentin (Neurontin) 100 mg capsule Take one capsule by mouth starting three days before surgery at bedtime.    metoprolol tartrate (LOPRESSOR) 12.5 mg, oral, 2 times daily    metroNIDAZOLE (Flagyl) 250 mg tablet Take one tablet at 6p, 7p, and 11p the night before surgery.    mv-mn/folic ac/calcium/vit K1 (WOMEN'S 50 PLUS MULTIVITAMIN ORAL) 1 tablet, oral, Daily    neomycin (Mycifradin) 500 mg tablet Take two tabs by mouth at 6p, 7pm, and 11p the night before surgery.    traZODone (DESYREL) 100 mg, oral, Daily    venlafaxine XR (EFFEXOR-XR) 37.5 mg, oral, Daily, Do not crush or chew.       Results from last 7 days   Lab Units 09/16/24  0610 09/15/24  1047 09/14/24  0516   WBC AUTO x10*3/uL 5.1 6.1 9.4   HEMOGLOBIN g/dL 11.1* 13.2 15.2   HEMATOCRIT % 33.5* 39.2 45.6   PLATELETS AUTO x10*3/uL 197 247 264     Lab Results   Component Value Date/Time    PROTIME 13.2 (H) 08/02/2024 1328    INR 1.2 (H) 08/02/2024 1328    ABO A 09/11/2024 1033     Results from last 7 days   Lab Units 09/16/24  0610 09/15/24  1047 09/14/24  0516   EGFR mL/min/1.73m*2 >90 >90 >90   ANION GAP mmol/L 11 14 16   BUN mg/dL 13 12 7   CREATININE mg/dL 0.40* 0.56 0.51   SODIUM mmol/L 140 134* 137   POTASSIUM mmol/L 3.5 3.7 3.7   CHLORIDE mmol/L 107 102 104   CO2 mmol/L 26 22 21   GLUCOSE mg/dL 90 118* 81     Results from last 7 days   Lab Units 09/16/24  0656  09/15/24  1047   CALCIUM mg/dL 8.1* 8.5*       EKG:     Sinus bradycardia  Otherwise normal ECG  When compared with ECG of 02-AUG-2024 14:18,  QRS axis Shifted right  Criteria for Septal infarct are no longer Present  ST elevation now present in Anterior leads  Confirmed by Philippe Kwan ([id number]) on 8/8/2024 2:35:52 PM      Echo:  Transthoracic Echo (TTE) Complete 07/15/2024  PHYSICIAN INTERPRETATION:  Left Ventricle: The left ventricular systolic function is normal, with a Santoyo's biplane calculated ejection fraction of 65%. There are no regional wall motion abnormalities. The left ventricular cavity size is normal. There is left ventricular concentric remodeling. Spectral Doppler shows a normal pattern of left ventricular diastolic filling.  Left Atrium: The left atrium is mildly dilated.  Right Ventricle: The right ventricle is normal in size. There is normal right ventricular global systolic function.  Right Atrium: The right atrium is upper limits of normal in size.  Aortic Valve: The aortic valve is trileaflet. There is no evidence of aortic valve stenosis. There is no evidence of aortic valve regurgitation. The peak instantaneous gradient of the aortic valve is 9.5 mmHg.  Mitral Valve: The mitral valve is abnormal. There is evidence of moderate to severe mitral valve stenosis. The doppler estimated mean and peak diastolic pressure gradients are 10.0 mmHg and 17.3 mmHg respectively. There is mild mitral valve regurgitation. Large mobile, penduculated mass seen attached to the atrial aspect of the anterior leaflet of the mitral valve measuring 6.3 x 2.4 cm, resulting in moderate to severe obstruction fo the mitral valve flow with mild mitral regurgitation. 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  Tricuspid Valve: The tricuspid valve is structurally normal. There is trace tricuspid regurgitation.  Pulmonic Valve: The pulmonic valve is structurally normal. There is physiologic pulmonic valve  "regurgitation.  Pericardium: There is no pericardial effusion noted.  Aorta: The aortic root is abnormal. There is mild dilatation of the aortic root.  Systemic Veins: The inferior vena cava appears to be of normal size. There is IVC inspiratory collapse greater than 50%.      CONCLUSIONS:  1. The left ventricular systolic function is normal, with a Santoyo's biplane calculated ejection fraction of 65%.  2. There is normal right ventricular global systolic function.  3. The left atrium is mildly dilated.  4. Large mobile, penduculated mass seen attached to the atrial aspect of the anterior leaflet of the mitral valve measuring 6.3 x 2.4 cm, resulting in moderate to severe obstruction fo the mitral valve flow with mild mitral regurgitation. 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  5. Dr. Harding has been notified via EPIC secure message.      Ejection Fractions:  LV EF   Date/Time Value Ref Range Status   2024 07:15 AM 65 %    07/15/2024 02:47 PM 65 %      Cath:  Coronary Angiography:   Left Heart Cath 2024  CONCLUSIONS:  1. Left main: no significant angiographic disease.  2. LAD: no significant angiographic disease.  3. LCx: 10% ostial stenosis.  4. RCA: no significant angiographic disease.  5. LVEDP 14mmHg, no aortic stenosis on LV-Ao gradient.    Vitals range for last 24 hours     Heart Rate:  [66-85]   Temp:  [36 °C (96.8 °F)-37.3 °C (99.1 °F)]   Resp:  [15-16]   BP: (100-114)/(60-76)   Height:  [172.7 cm (5' 8\")]   Weight:  [64.6 kg (142 lb 6.7 oz)]   SpO2:  [94 %-98 %]      Visit Vitals  /76   Pulse 77   Temp 36 °C (96.8 °F) (Temporal)   Resp 15   Ht 1.727 m (5' 8\")   Wt 64.6 kg (142 lb 6.7 oz)   LMP  (LMP Unknown)   SpO2 98%   BMI 21.65 kg/m²   OB Status Postmenopausal   Smoking Status Former   BSA 1.76 m²     Medical Gas Therapy: None (Room air)  Medical Gas Delivery Method: Nasal cannula  Weight  Av.8 kg (140 lb 11.5 oz)  Min: 60.6 kg (133 lb 9.6 oz)  Max: 65.5 kg (144 lb 6.4 " oz)              Code Status: Full Code              Relevant Problems   Cardiac   (+) High cholesterol      Neuro   (+) Anxiety   (+) Current moderate episode of major depressive disorder without prior episode (Multi)      GI   (+) Colon cancer (Multi)   (+) Malignant neoplasm of transverse colon (Multi)      Liver   (+) Colon cancer (Multi)   (+) Malignant neoplasm of transverse colon (Multi)      Musculoskeletal   (+) Lumbar spondylosis       Clinical information reviewed:   Tobacco  Allergies  Meds   Med Hx  Surg Hx  OB Status  Fam Hx  Soc   Hx        NPO Detail:  NPO/Void Status  Carbohydrate Drink Given Prior to Surgery? : N  Date of Last Liquid: 09/16/24  Time of Last Liquid: 0000  Date of Last Solid: 09/16/24  Time of Last Solid: 1800  Last Intake Type: Clear fluids  Time of Last Void: 0800         Physical Exam    Airway  Mallampati: II  TM distance: >3 FB  Neck ROM: full  Comments: Retrognathic   Cardiovascular   Rhythm: regular  Rate: normal     Dental - normal exam     Pulmonary   Comments: Normal RR  Non-labored respiration    Abdominal      Other findings: Mac 3 grade ! view          Anesthesia Plan    History of general anesthesia?: yes  History of complications of general anesthesia?: no    ASA 3     MAC   (With standard ASA monitoring.)  The patient is not a current smoker.    intravenous induction   Anesthetic plan and risks discussed with patient.    Plan discussed with CAA.

## 2024-09-16 NOTE — CARE PLAN
The patient's goals for the shift include      The clinical goals for the shift include pt will ambulate in spicer    Problem: Pain - Adult  Goal: Verbalizes/displays adequate comfort level or baseline comfort level  Outcome: Progressing     Problem: Safety - Adult  Goal: Free from fall injury  Outcome: Progressing     Problem: Discharge Planning  Goal: Discharge to home or other facility with appropriate resources  Outcome: Progressing     Problem: Chronic Conditions and Co-morbidities  Goal: Patient's chronic conditions and co-morbidity symptoms are monitored and maintained or improved  Outcome: Progressing     Problem: Fall/Injury  Goal: Not fall by end of shift  Outcome: Progressing  Goal: Be free from injury by end of the shift  Outcome: Progressing  Goal: Verbalize understanding of personal risk factors for fall in the hospital  Outcome: Progressing  Goal: Verbalize understanding of risk factor reduction measures to prevent injury from fall in the home  Outcome: Progressing  Goal: Use assistive devices by end of the shift  Outcome: Progressing  Goal: Pace activities to prevent fatigue by end of the shift  Outcome: Progressing     Problem: Pain  Goal: Takes deep breaths with improved pain control throughout the shift  Outcome: Progressing  Goal: Turns in bed with improved pain control throughout the shift  Outcome: Progressing  Goal: Walks with improved pain control throughout the shift  Outcome: Progressing  Goal: Performs ADL's with improved pain control throughout shift  Outcome: Progressing  Goal: Participates in PT with improved pain control throughout the shift  Outcome: Progressing  Goal: Free from opioid side effects throughout the shift  Outcome: Progressing  Goal: Free from acute confusion related to pain meds throughout the shift  Outcome: Progressing

## 2024-09-16 NOTE — DISCHARGE SUMMARY
Discharge Diagnosis  Malignant neoplasm of transverse colon (Multi)    Issues Requiring Follow-Up  Follo up visit with Dr Cotter    Test Results Pending At Discharge  Pending Labs       Order Current Status    Surgical Pathology Exam In process    Surgical Pathology Exam In process            Hospital Course   .64 yr old female with Transverse colon cancer s/p extended right colectomy with ileocolic anastomosis on 9/11/2024 with Dr. Cotter, as well as enteroscopy by Dr Pierce 9/16/2024.  Please see operative report for full details. Patient tolerated the procedure well and recovered briefly in PACU before being transitioned to regular nursing floor. Post-op course was complicated by ileus. Diet was advanced as tolerated.  IV medication transitioned to oral as diet advanced. On the day of discharge, the pt was tolerating a diet, pain was controlled on PO pain medication, and they were ambulating and voiding spontaneously. They were discharged home in stable condition with instructions to follow up as outpatient.       Pertinent Physical Exam At Time of Discharge  Physical Exam  Constitutional:       Appearance: Normal appearance.   Cardiovascular:      Rate and Rhythm: Normal rate.   Pulmonary:      Effort: Pulmonary effort is normal.   Abdominal:      Comments: Abdomen soft, mildly distended, minimal tenderness, Lap sites with Dermabond, C/D/I, edges well approximated, minor bruising without drainage or hematoma.     Genitourinary:     Comments: Voiding without difficulty   Musculoskeletal:         General: Normal range of motion.   Skin:     General: Skin is warm and dry.   Neurological:      Mental Status: She is oriented to person, place, and time.   Psychiatric:         Mood and Affect: Mood normal.         Behavior: Behavior normal.         Home Medications     Medication List      START taking these medications     ondansetron ODT 4 mg disintegrating tablet; Commonly known as:   Zofran-ODT; Take 1 tablet (4  mg) by mouth every 8 hours if needed for   nausea or vomiting.   oxyCODONE 5 mg immediate release tablet; Commonly known as: Roxicodone;   Take 1 tablet (5 mg) by mouth every 4 hours if needed for moderate pain (4   - 6).     CONTINUE taking these medications     acetaminophen 325 mg tablet; Commonly known as: Tylenol; Take 2 tablets   (650 mg) by mouth every 6 hours if needed for mild pain (1 - 3).   ezetimibe 10 mg tablet; Commonly known as: Zetia; Take 1 tablet (10 mg)   by mouth once daily.   gabapentin 100 mg capsule; Commonly known as: Neurontin; Take one   capsule by mouth starting three days before surgery at bedtime.   metoprolol tartrate 25 mg tablet; Commonly known as: Lopressor; Take 0.5   tablets (12.5 mg) by mouth 2 times a day.   traZODone 100 mg tablet; Commonly known as: Desyrel; Take 1 tablet (100   mg) by mouth once daily.   venlafaxine XR 37.5 mg 24 hr capsule; Commonly known as: Effexor-XR;   Take 1 capsule (37.5 mg) by mouth once daily. Do not crush or chew.   WOMEN'S 50 PLUS MULTIVITAMIN ORAL     STOP taking these medications     aspirin 81 mg EC tablet   chlorhexidine 0.12 % solution; Commonly known as: Peridex   metroNIDAZOLE 250 mg tablet; Commonly known as: Flagyl   neomycin 500 mg tablet; Commonly known as: Mycifradin       Outpatient Follow-Up  Future Appointments   Date Time Provider Department Center   10/8/2024  3:40 PM Yann Cotter MD [id number] [facility]   2/11/2025  9:00 AM CONC STRESS/ECHO LAB [id number] [place]   2/11/2025 10:00 AM Aliya Arguelles APRN-CNP [id number] [place]   2/21/2025 10:00 AM Albert Berry MD [id number] [facility]   3/14/2025 11:15 AM Basilio Hauser APRN-CNP [id number] [place]       Rowena Carter, APRN-CNP

## 2024-09-16 NOTE — PROGRESS NOTES
Occupational Therapy                 Therapy Communication Note    Patient Name: Maribel Lakhani  MRN: [id number]  Department: [facility] A   Room: [facility]  Today's Date: 9/16/2024     Discipline: Occupational Therapy    Missed Visit Reason: Missed Visit Reason: Other (Comment) (ZPt declined OT tx this date. Pt fatigued and she is waitinh to hear if she can go home today. Requested OT return at another time)    Missed Time: Attempt

## 2024-09-16 NOTE — PROGRESS NOTES
Physical Therapy    Physical Therapy Treatment    Patient Name: Maribel Lakhani  MRN: [id number]  Department: [place] [id number]  Room: [address] [place] [address]  Today's Date: 9/16/2024  Time Calculation  Start Time: 0912  Stop Time: 0936  Time Calculation (min): 24 min         Assessment/Plan   PT Assessment  End of Session Communication: Bedside nurse  Assessment Comment: pt able to walk without AD and do stairs  End of Session Patient Position: Alarm on, Bed, 3 rail up  PT Plan  Inpatient/Swing Bed or Outpatient: Inpatient  PT Plan  Treatment/Interventions: Bed mobility, Transfer training, Gait training, Stair training  PT Plan: Ongoing PT  PT Frequency: 4 times per week  PT Discharge Recommendations: Low intensity level of continued care  Equipment Recommended upon Discharge: Wheeled walker  PT Recommended Transfer Status: Stand by assist  PT - OK to Discharge: Yes (per  POC)      General Visit Information:   PT  Visit  PT Received On: 09/16/24  General  Reason for Referral: 65 y/o F s/p laparoscopic extended colectomy; ileocolic anastomosis on 9/11/24  Referred By: TERESO Cotter  Prior to Session Communication: Bedside nurse  Patient Position Received: Bed, 3 rail up, Alarm off, not on at start of session  Preferred Learning Style: auditory, kinesthetic, visual  General Comment: pt agreeable to tx, completed all tasks this date  with out use of AD.    Subjective   Precautions:       Vital Signs (Past 2hrs)        Date/Time Vitals Session Patient Position Pulse Resp SpO2 BP MAP (mmHg)    09/16/24 1043 --  --  80  16  99 %  114/66  --     09/16/24 1047 --  --  79  16  99 %  114/66  --     09/16/24 1058 --  --  75  14  96 %  118/76  --     09/16/24 1113 --  --  77  15  96 %  114/79  --                         Objective   Pain:  Pain Assessment  0-10 (Numeric) Pain Score: 2  Pain Type: Acute pain  Pain Location: Abdomen  Cognition:  Cognition  Overall Cognitive Status: Within Functional Limits  Coordination:     Postural Control:  Static Standing  Balance  Static Standing-Comment/Number of Minutes: pt performed static standing balance without UE support.  x5 min no noted LOB  Extremity/Trunk Assessments:    Activity Tolerance:     Treatments:            Bed Mobility  Bed Mobility: Yes  Bed Mobility 1  Bed Mobility 1: Supine to sitting, Sitting to supine  Level of Assistance 1: Modified independent  Bed Mobility Comments 1: HOB elevated    Ambulation/Gait Training  Ambulation/Gait Training Performed: Yes  Ambulation/Gait Training 1  Surface 1: Level tile  Assistance 1: Close supervision  Comments/Distance (ft) 1: 125x1, 400x1 (slow step through gait pattern.  min decreased arm swing, no overt LOB)  Transfer 1  Technique 1: Sit to stand, Stand to sit  Transfer Level of Assistance 1: Independent    Stairs  Stairs: Yes  Stairs  Comment/Number of Steps 1: pt performed 4 steps with 1 rail.  SBA.  step to gait pattern.  no overt LOB.  pt performed x2    Outcome Measures:  [facility] Basic Mobility  Turning from your back to your side while in a flat bed without using bedrails: None  Moving from lying on your back to sitting on the side of a flat bed without using bedrails: None  Moving to and from bed to chair (including a wheelchair): A little  Standing up from a chair using your arms (e.g. wheelchair or bedside chair): A little  To walk in hospital room: A little  Climbing 3-5 steps with railing: A little  Basic Mobility - Total Score: 20    Education Documentation  Mobility Training, taught by Thomas Bell PTA at 9/16/2024 11:56 AM.  Learner: Patient  Readiness: Acceptance  Method: Explanation  Response: Verbalizes Understanding    Education Comments  No comments found.        OP EDUCATION:       Encounter Problems       Encounter Problems (Active)       Balance       Goal 1 (Progressing)       Start:  09/12/24    Expected End:  09/26/24       Pt performs all sitting balance IND and standing balance mod IND using LRAD            Mobility       STG - Patient will  ambulate (Progressing)       Start:  09/12/24    Expected End:  09/26/24       150 ft mod IND using LRAD         STG - Patient will ascend and descend a flight of stairs with L HR support mod IND (Progressing)       Start:  09/12/24    Expected End:  09/26/24               Pain - Adult             Encounter Problems (Resolved)       PT Transfers       STG - Patient to transfer to and from sit to supine (Met)       Start:  09/12/24    Expected End:  09/26/24    Resolved:  09/16/24    Mod IND using the log roll technique         STG - Patient will transfer sit to and from stand (Met)       Start:  09/12/24    Expected End:  09/26/24    Resolved:  09/16/24    Mod IND using LRAD

## 2024-09-17 ENCOUNTER — TELEPHONE (OUTPATIENT)
Dept: SURGERY | Facility: CLINIC | Age: 64
End: 2024-09-17
Payer: COMMERCIAL

## 2024-09-17 ASSESSMENT — PAIN SCALES - GENERAL: PAINLEVEL_OUTOF10: 0 - NO PAIN

## 2024-09-17 NOTE — TELEPHONE ENCOUNTER
"Post op call.  She is s/p 9/11/2024  Laparoscopic Right colectomy.  She reports that she is feeling \"pretty good.\" Just some soreness at the incision.  She is taking one Oxycodone at bedtime.  Taking Tylenol during the day.  Able to complete ADL's.  She is passing gas.  Has had three bowel motions since the surgery. Moved her bowels today. Her stool consistency was like pudding and starting to form.  Denies BRBPR.  Appetite is good.  Denies bloating, nausea, and vomiting.  Denies fever/chills.  Incisions clean and dry.  Normal post op.  Pathology is pending.  I invited Maribel to call if she needs anything prior to the next office visit.  Elissa Meade RN    "

## 2024-09-18 ENCOUNTER — APPOINTMENT (OUTPATIENT)
Dept: PRIMARY CARE | Facility: CLINIC | Age: 64
End: 2024-09-18
Payer: COMMERCIAL

## 2024-09-18 ENCOUNTER — APPOINTMENT (OUTPATIENT)
Dept: CARDIAC SURGERY | Facility: HOSPITAL | Age: 64
End: 2024-09-18
Payer: COMMERCIAL

## 2024-09-23 ENCOUNTER — TELEPHONE (OUTPATIENT)
Dept: SURGERY | Facility: HOSPITAL | Age: 64
End: 2024-09-23
Payer: COMMERCIAL

## 2024-09-23 LAB
LAB AP BLOCK FOR ADDITIONAL STUDIES: NORMAL
LABORATORY COMMENT REPORT: NORMAL
PATH REPORT.FINAL DX SPEC: NORMAL
PATH REPORT.GROSS SPEC: NORMAL
PATH REPORT.RELEVANT HX SPEC: NORMAL
PATH REPORT.TOTAL CANCER: NORMAL
PATHOLOGY SYNOPTIC REPORT: NORMAL

## 2024-09-23 NOTE — TELEPHONE ENCOUNTER
"Called patient s/p Laparoscopic Extended Right Colectomy; Ileocolic Anastamosis on 9/11/24.  Patient states he pain is well maintained - reports 2/10 pain that is managed with PRN OTC Tylenol.  States she feels some abdominal muscle \"tightness\" and some itchiness where the glue on her incision is.  States she is not having difficulty with ADLs and is getting around well.    States her bowel movements are great - reports she is having 1-2 soft, formed bowel movements daily.  Says her appetite is good - she is eating well and avoiding fiber.  Says she has lost 3 1/2 pounds in the last week and is monitoring her weight / keeping a log.  States her incision looks good - denies drainage or redness  Denies n/v, fever, or chills.  Patient has post-op visit scheduled.  Bel Burrows RN    "

## 2024-09-24 DIAGNOSIS — K63.89 SMALL BOWEL MASS: Primary | ICD-10-CM

## 2024-09-24 DIAGNOSIS — C18.2 MALIGNANT NEOPLASM OF ASCENDING COLON (MULTI): Primary | ICD-10-CM

## 2024-09-26 NOTE — PROGRESS NOTES
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 She returns to the office for a post op visit.      9/11/2024 Operation:  Laparoscopic Extended Right colectomy    Pathology:  A.  Segment of terminal ileum, appendix and colon, resection:  Invasive colonic adenocarcinoma, moderately differentiated.  Twenty-one lymph nodes, negative for malignancy (0/21).  See synoptic report and note.  --T3N0     NOTE: Please see previous report [id number] for mismatch repair protein immunohistochemistry results.    10/02/2024 CT Enterography  1. Postoperative changes related to prior partial ascending colectomy. Ascending colonic mucosal wall thickening adjacent to the anastomotic sutures which may be postoperative in nature. Clinical  correlation and close attention on follow-up studies recommended.      2. Uterine mass which may represent a fibroid, however, incompletely  characterized in this study. Correlation with pelvic ultrasound  findings recommended.     Doing well, has seen oncology with plans for close surveillance.     No chemotherapy recommended.  Seeing Dr. Carrero tomorrow.      Past Medical History  Colon cancer  Anxiety/Depression  HLD  Atrial Myxoma    Surgical History  Atrial Myxoma excision  Cardiac catheterization  D&C  Foot surgery  Oral surgery      Review of Systems  Constitutional: Negative for fever, chills, anorexia, weight loss, malaise     ENMT: Negative for nasal discharge, congestion, ear pain, mouth pain, throat pain     Respiratory: Negative for cough, hemoptysis, wheezing, shortness of breath     Cardiac: Negative for chest pain, dyspnea on exertion, orthopnea, palpitations, syncope, (+)HLD, (+)ATRIAL MYXOMA     Gastrointestinal: Negative for nausea, vomiting, diarrhea, constipation, abdominal pain, (+)COLON CANCER    Genitourinary: Negative for discharge, dysuria, flank pain, frequency, hematuria     Musculoskeletal: Negative for decreased ROM, pain, swelling,  weakness     Neurological: Negative for dizziness, confusion, headache, seizures, syncope     Psychiatric: Negative for mood changes, anxiety, hallucinations, sleep changes, suicidal ideas     Skin: Negative for mass, pain, itching, rash, ulcer     Endocrine: Negative for heat intolerance, cold intolerance, excessive sweating, polyuria, excess thirst     Hematologic/Lymph: Negative for anemia, bruising, easy bleeding, night sweats, petechiae, history of DVT/PE or cancer     Allergic/Immunologic: Negative for anaphylaxis, itchy/ teary eyes, itching, sneezing, swelling      Physical Exam  Constitutional: Well developed, awake/alert/oriented x3, no distress, alert and cooperative             Eyes: Sclera anicteric, no conjunctival inflammation, conjugate gaze    ENMT: mucous membranes moist, no apparent injury,            Head/Neck: Neck supple, no apparent injury, No JVD, trachea midline, no bruits              Respiratory/Thorax: Patent airways, CTAB, normal breath sounds with good chest expansion, thorax symmetric         Cardiovascular: Regular, rate and rhythm, no murmurs, normal S1 and S2         Gastrointestinal: Incisions clean dry and intact nondistended, soft, non-tender, no rebound tenderness or guarding, no masses palpable, no organomegaly, +BS, no bruits               Extremities: normal extremities, no cyanosis edema, contusions or wounds, 2+ femoral pulses B/L              Neurological: alert and oriented x3, normal strength, Normal gait          Lymphatic: No palpable inguinal lymphadenopathy   Psychological: Appropriate mood and behavior         Skin: Warm and dry, no lesions, no rashes                Anorectal:      Impression:  Transverse colon Cancer status post resection.  pT3 N0.  Tumor budding but no additional high risk factors  Cystic, lymphatic lesion seen at the duodenal sweep intraoperatively, photographs taken under media tab    Plan:    Surveillance with oncology  Appointment with tomorrow  hepatobiliary surgery for duodenal cystic lesion seen intraoperatively.  Unable to be assessed endoscopically by GI.  CT enterography without significant findings.

## 2024-10-02 ENCOUNTER — HOSPITAL ENCOUNTER (OUTPATIENT)
Dept: RADIOLOGY | Facility: HOSPITAL | Age: 64
Discharge: HOME | End: 2024-10-02
Payer: COMMERCIAL

## 2024-10-02 DIAGNOSIS — D37.2: ICD-10-CM

## 2024-10-02 PROCEDURE — 74177 CT ABD & PELVIS W/CONTRAST: CPT

## 2024-10-02 PROCEDURE — 2550000001 HC RX 255 CONTRASTS: Performed by: SURGERY

## 2024-10-03 ENCOUNTER — PATIENT OUTREACH (OUTPATIENT)
Dept: HEMATOLOGY/ONCOLOGY | Facility: CLINIC | Age: 64
End: 2024-10-03

## 2024-10-03 ENCOUNTER — OFFICE VISIT (OUTPATIENT)
Dept: HEMATOLOGY/ONCOLOGY | Facility: CLINIC | Age: 64
End: 2024-10-03
Payer: COMMERCIAL

## 2024-10-03 VITALS
TEMPERATURE: 97.8 F | HEIGHT: 68 IN | WEIGHT: 139.11 LBS | DIASTOLIC BLOOD PRESSURE: 78 MMHG | HEART RATE: 80 BPM | OXYGEN SATURATION: 97 % | BODY MASS INDEX: 21.08 KG/M2 | SYSTOLIC BLOOD PRESSURE: 112 MMHG | RESPIRATION RATE: 17 BRPM

## 2024-10-03 DIAGNOSIS — C18.2 MALIGNANT NEOPLASM OF ASCENDING COLON (MULTI): ICD-10-CM

## 2024-10-03 LAB
BASOPHILS # BLD AUTO: 0.05 X10*3/UL (ref 0–0.1)
BASOPHILS NFR BLD AUTO: 0.8 %
EOSINOPHIL # BLD AUTO: 0.06 X10*3/UL (ref 0–0.7)
EOSINOPHIL NFR BLD AUTO: 0.9 %
ERYTHROCYTE [DISTWIDTH] IN BLOOD BY AUTOMATED COUNT: 13.3 % (ref 11.5–14.5)
HCT VFR BLD AUTO: 39.1 % (ref 36–46)
HGB BLD-MCNC: 13.1 G/DL (ref 12–16)
IMM GRANULOCYTES # BLD AUTO: 0.01 X10*3/UL (ref 0–0.7)
IMM GRANULOCYTES NFR BLD AUTO: 0.2 % (ref 0–0.9)
LYMPHOCYTES # BLD AUTO: 2.23 X10*3/UL (ref 1.2–4.8)
LYMPHOCYTES NFR BLD AUTO: 34.7 %
MCH RBC QN AUTO: 31.3 PG (ref 26–34)
MCHC RBC AUTO-ENTMCNC: 33.5 G/DL (ref 32–36)
MCV RBC AUTO: 93 FL (ref 80–100)
MONOCYTES # BLD AUTO: 0.38 X10*3/UL (ref 0.1–1)
MONOCYTES NFR BLD AUTO: 5.9 %
NEUTROPHILS # BLD AUTO: 3.7 X10*3/UL (ref 1.2–7.7)
NEUTROPHILS NFR BLD AUTO: 57.5 %
NRBC BLD-RTO: 0 /100 WBCS (ref 0–0)
PLATELET # BLD AUTO: 315 X10*3/UL (ref 150–450)
RBC # BLD AUTO: 4.19 X10*6/UL (ref 4–5.2)
WBC # BLD AUTO: 6.4 X10*3/UL (ref 4.4–11.3)

## 2024-10-03 PROCEDURE — 99214 OFFICE O/P EST MOD 30 MIN: CPT | Performed by: STUDENT IN AN ORGANIZED HEALTH CARE EDUCATION/TRAINING PROGRAM

## 2024-10-03 PROCEDURE — 99204 OFFICE O/P NEW MOD 45 MIN: CPT | Performed by: STUDENT IN AN ORGANIZED HEALTH CARE EDUCATION/TRAINING PROGRAM

## 2024-10-03 PROCEDURE — 85025 COMPLETE CBC W/AUTO DIFF WBC: CPT | Performed by: STUDENT IN AN ORGANIZED HEALTH CARE EDUCATION/TRAINING PROGRAM

## 2024-10-03 PROCEDURE — G2211 COMPLEX E/M VISIT ADD ON: HCPCS | Performed by: STUDENT IN AN ORGANIZED HEALTH CARE EDUCATION/TRAINING PROGRAM

## 2024-10-03 PROCEDURE — 3008F BODY MASS INDEX DOCD: CPT | Performed by: STUDENT IN AN ORGANIZED HEALTH CARE EDUCATION/TRAINING PROGRAM

## 2024-10-03 SDOH — ECONOMIC STABILITY: FOOD INSECURITY: WITHIN THE PAST 12 MONTHS, THE FOOD YOU BOUGHT JUST DIDN'T LAST AND YOU DIDN'T HAVE MONEY TO GET MORE.: NEVER TRUE

## 2024-10-03 SDOH — ECONOMIC STABILITY: FOOD INSECURITY: WITHIN THE PAST 12 MONTHS, YOU WORRIED THAT YOUR FOOD WOULD RUN OUT BEFORE YOU GOT MONEY TO BUY MORE.: NEVER TRUE

## 2024-10-03 SDOH — HEALTH STABILITY: PHYSICAL HEALTH

## 2024-10-03 ASSESSMENT — COLUMBIA-SUICIDE SEVERITY RATING SCALE - C-SSRS
2. HAVE YOU ACTUALLY HAD ANY THOUGHTS OF KILLING YOURSELF?: NO
6. HAVE YOU EVER DONE ANYTHING, STARTED TO DO ANYTHING, OR PREPARED TO DO ANYTHING TO END YOUR LIFE?: NO
1. IN THE PAST MONTH, HAVE YOU WISHED YOU WERE DEAD OR WISHED YOU COULD GO TO SLEEP AND NOT WAKE UP?: NO

## 2024-10-03 ASSESSMENT — PATIENT HEALTH QUESTIONNAIRE - PHQ9
1. LITTLE INTEREST OR PLEASURE IN DOING THINGS: NOT AT ALL
2. FEELING DOWN, DEPRESSED OR HOPELESS: NOT AT ALL
SUM OF ALL RESPONSES TO PHQ9 QUESTIONS 1 AND 2: 0

## 2024-10-03 ASSESSMENT — ENCOUNTER SYMPTOMS
DEPRESSION: 0
LOSS OF SENSATION IN FEET: 0
OCCASIONAL FEELINGS OF UNSTEADINESS: 0

## 2024-10-03 ASSESSMENT — SOCIAL DETERMINANTS OF HEALTH (SDOH)
IN THE PAST 12 MONTHS, HAS THE ELECTRIC, GAS, OIL, OR WATER COMPANY THREATENED TO SHUT OFF SERVICE IN YOUR HOME?: NO
WITHIN THE LAST YEAR, HAVE YOU BEEN KICKED, HIT, SLAPPED, OR OTHERWISE PHYSICALLY HURT BY YOUR PARTNER OR EX-PARTNER?: NO
WITHIN THE LAST YEAR, HAVE YOU BEEN AFRAID OF YOUR PARTNER OR EX-PARTNER?: NO
WITHIN THE LAST YEAR, HAVE TO BEEN RAPED OR FORCED TO HAVE ANY KIND OF SEXUAL ACTIVITY BY YOUR PARTNER OR EX-PARTNER?: NO
WITHIN THE LAST YEAR, HAVE YOU BEEN HUMILIATED OR EMOTIONALLY ABUSED IN OTHER WAYS BY YOUR PARTNER OR EX-PARTNER?: NO

## 2024-10-03 ASSESSMENT — PAIN SCALES - GENERAL: PAINLEVEL: 2

## 2024-10-03 NOTE — PROGRESS NOTES
"Patient ambulated into clinic for new patient visit with Dr. Bullock accompanied by her  Mike. Medications and allergies reviewed.     Nausea: denies  Vomiting: denies  Diarrhea: one episode yesterday after CT scan   Fatigue: \"nothing more than you would expect after 2 surgeries in a month and a half\"  Neuropathy: denies   Pain- minor at incision sites.   Reports Normal bowel movements   Denies blood in stool     Pathology reviewed.     Plan: Labs to be drawn today            Follow up in 3 months.                "

## 2024-10-03 NOTE — PROGRESS NOTES
Patient ID: Maribel Lakhani is a 64 y.o. female.  Referring Physician: Yann Cotter MD  [address] [place] [facility] [address]  [place] [address]  Primary Care Provider: Albert Berry MD  Surgeon: Yann Cotter  Visit Type: Initial Visit    Cancer History:  DIAGNOSIS: Transverse Colon cancer    STAGING: pT3 pN0 M0    CURRENT SITES OF DISEASE:     MOLECULAR/GENOMIC:  MMR-proficient    TUMOR MARKER:   6/2024: CEA 3.4    CURRENT TREATMENT:       PRIOR TREATMENT:   9/11/24: Laparoscopic Extended R colectomy with ileocolic anastomosis    HISTORY:   6/13/24: C-scope showed transverse colon polyp, rectal polyp x2 and transverse colon mass. Path of mass showed invasive moderately differentiated adenocarcinoma, MMR protein expression intact  7/12/24: CT showed no metastatic disease, but with L atrial mass.   8/2/24: S/p excision of L atrial myxoma. Pathology confirmed cardiac myxoma  9/11/24: S/p  Laparoscopic Extended R colectomy with ileocolic anastomosis. Intraoperatively found to have a 1 to 2 cm lesion on the serosa of the very proximal jejunum immediately at the ligament of Treitz.  This was soft and not an obvious malignancy but a possible calcification.  Given the location biopsy was not attempted at this time. Final pathology showed pT3 pN0 Mx, margins negative, no perforation, LV, PNI. There were several tumor buds noted (score: High).     PMH: Anxiety, HLD  FH: father with cancer, maternal grandmother with cancer  SH: Previously smoked cigarettes, occasional EtOH, no illicits    Subjective:   Feeling well, recovering from surgery. Eating and drinking well with no new sypmtoms.     No fevers, chills, chest pain, shortness of breath, abdominal pain, nausea, vomiting, diarrhea, or rashes.    ROS as above. Remainder of 10-point review of systems elicited and negative.    Objective:  /78 (BP Location: Left arm, Patient Position: Sitting, BP Cuff Size: Adult long)   Pulse 80   Temp 36.6 °C (97.8 °F) (Temporal)   Resp  "17   Ht (S) 1.728 m (5' 8.03\")   Wt 63.1 kg (139 lb 1.8 oz)   LMP  (LMP Unknown)   SpO2 97%   BMI 21.13 kg/m²     PE:  Gen: A&O, NAD  Head: Normocephalic, atraumatic  Eyes: no scleral icterus  ENT: mucous membranes moist, no oropharyngeal lesions  Resp: Lungs CTAB  Cardiac: Normal rate, regular rhythm, no murmurs appreciated  Abdomen: Soft, nondistended, nontender, +BS  Neuro: CNII-XII grossly intact  Psych: appropriate mood & affect  Skin: warm, dry, no apparent rashes     CT C/A/P 7/12/24:  A large filling defect seen in the left atrium which may represent  myxoma versus thrombus. Echocardiogram advised.      No features of metastatic disease to the chest. Reported history of  colonic mass. Stool obscures evaluation of the colon. Slight hepatic  steatosis. Senescent changes detected.    Surgical pathology 9/11/24:  A.  Segment of terminal ileum, appendix and colon, resection:  Invasive colonic adenocarcinoma, moderately differentiated.  Twenty-one lymph nodes, negative for malignancy (0/21).  See synoptic report and note.     NOTE: Please see previous report [id number] for mismatch repair protein immunohistochemistry results    Tumor Site  Transverse colon   Histologic Type  Adenocarcinoma   Histologic Grade  G2, moderately differentiated   Tumor Size  Greatest dimension (Centimeters): 2.3 cm   Tumor Extent  Invades through muscularis propria into the pericolonic or perirectal tissue   Macroscopic Tumor Perforation  Not identified   Lymphovascular Invasion  Not identified   Perineural Invasion  Not identified   Number of Tumor Buds  12 per 'hotspot' field   Tumor Bud Score  High (10 or more)   Type of Polyp in which Invasive Carcinoma Arose  The adjcent mucosa shows features of a sessile serrated polyp.   Treatment Effect  No known presurgical therapy   MARGINS   Margin Status for Invasive Carcinoma  All margins negative for invasive carcinoma   Closest Margin(s) to Invasive Carcinoma  Distal   Distance from " Invasive Carcinoma to Closest Margin  6.0 cm   Distance from Invasive Carcinoma to Distal Margin  Not applicable   Margin Status for Non-Invasive Tumor  All margins negative for high-grade dysplasia / intramucosal carcinoma and low-grade dysplasia       ECOG Performance Status: 1     Assessment & Plan:   Maribel Lakhani is a 64 y.o. female with MMR-proficient transverse colon cancer, pT3 pN0, Stage II with no high risk features dx 6/2024 and s/p resection on 9/11/24.     I discussed with pt that this is a Stage IIA colon cancer. Most of the risk factors for recurrence are absent on her pathology specimen, but there were tumor buds noted. I discussed with her that tumor budding may increase the risk of her cancer coming back, but the data supporting it as a major risk factor for recurrence are limited. I discussed that based on this, we could consider adjuvant chemotherapy, but it is unclear how much benefit she will receive from chemotherapy in this setting. Alternatively, we could adopt a close surveillacne plan. She is in agreement to proceed with surveillance.     Plan:   Stage IIA transverse colon cancer  - Plan for labs including CEA and Signatera q3mo and CT q6mo x2 years followed by labs q4-6mo and CT annually years 3-5  - Will need C-scope 9/2025  - Repeat labs including baseline Signatera today  - RTC 3mo with labs

## 2024-10-07 ENCOUNTER — APPOINTMENT (OUTPATIENT)
Dept: LAB | Facility: LAB | Age: 64
End: 2024-10-07
Payer: COMMERCIAL

## 2024-10-07 LAB
ALBUMIN SERPL BCP-MCNC: 4.2 G/DL (ref 3.4–5)
ALP SERPL-CCNC: 50 U/L (ref 33–136)
ALT SERPL W P-5'-P-CCNC: 11 U/L (ref 7–45)
ANION GAP SERPL CALC-SCNC: 17 MMOL/L (ref 10–20)
AST SERPL W P-5'-P-CCNC: 19 U/L (ref 9–39)
BILIRUB SERPL-MCNC: 0.5 MG/DL (ref 0–1.2)
BUN SERPL-MCNC: 15 MG/DL (ref 6–23)
CALCIUM SERPL-MCNC: 9.8 MG/DL (ref 8.6–10.3)
CEA SERPL-MCNC: 2.3 UG/L
CHLORIDE SERPL-SCNC: 106 MMOL/L (ref 98–107)
CO2 SERPL-SCNC: 21 MMOL/L (ref 21–32)
CREAT SERPL-MCNC: 0.62 MG/DL (ref 0.5–1.05)
EGFRCR SERPLBLD CKD-EPI 2021: >90 ML/MIN/1.73M*2
GLUCOSE SERPL-MCNC: 88 MG/DL (ref 74–99)
POTASSIUM SERPL-SCNC: 4.1 MMOL/L (ref 3.5–5.3)
PROT SERPL-MCNC: 6.9 G/DL (ref 6.4–8.2)
SODIUM SERPL-SCNC: 140 MMOL/L (ref 136–145)

## 2024-10-07 PROCEDURE — 82378 CARCINOEMBRYONIC ANTIGEN: CPT

## 2024-10-08 ENCOUNTER — OFFICE VISIT (OUTPATIENT)
Dept: SURGERY | Facility: CLINIC | Age: 64
End: 2024-10-08
Payer: COMMERCIAL

## 2024-10-08 DIAGNOSIS — C18.4 MALIGNANT NEOPLASM OF TRANSVERSE COLON (MULTI): Primary | ICD-10-CM

## 2024-10-08 PROCEDURE — 99211 OFF/OP EST MAY X REQ PHY/QHP: CPT | Performed by: SURGERY

## 2024-10-09 ENCOUNTER — OFFICE VISIT (OUTPATIENT)
Dept: SURGICAL ONCOLOGY | Facility: HOSPITAL | Age: 64
End: 2024-10-09
Payer: COMMERCIAL

## 2024-10-09 VITALS
BODY MASS INDEX: 21.43 KG/M2 | DIASTOLIC BLOOD PRESSURE: 79 MMHG | SYSTOLIC BLOOD PRESSURE: 122 MMHG | OXYGEN SATURATION: 99 % | WEIGHT: 141.09 LBS | HEART RATE: 79 BPM | RESPIRATION RATE: 16 BRPM | TEMPERATURE: 97.9 F

## 2024-10-09 DIAGNOSIS — K63.89 SMALL BOWEL MASS: ICD-10-CM

## 2024-10-09 PROCEDURE — 99214 OFFICE O/P EST MOD 30 MIN: CPT | Performed by: SURGERY

## 2024-10-09 PROCEDURE — 99204 OFFICE O/P NEW MOD 45 MIN: CPT | Performed by: SURGERY

## 2024-10-09 ASSESSMENT — ENCOUNTER SYMPTOMS
DYSURIA: 0
SHORTNESS OF BREATH: 0
NAUSEA: 0
DEPRESSION: 0
OCCASIONAL FEELINGS OF UNSTEADINESS: 0
HEADACHES: 0
ADENOPATHY: 0
FLANK PAIN: 0
HALLUCINATIONS: 0
EYE DISCHARGE: 0
SORE THROAT: 0
CONFUSION: 0
LOSS OF SENSATION IN FEET: 0
ABDOMINAL PAIN: 0
ENDOCRINE NEGATIVE: 1
SPEECH DIFFICULTY: 0
WEAKNESS: 0
CONSTITUTIONAL NEGATIVE: 1
VOMITING: 0

## 2024-10-09 ASSESSMENT — PAIN SCALES - GENERAL: PAINLEVEL: 0-NO PAIN

## 2024-10-09 NOTE — PROGRESS NOTES
Subjective     HPI  Maribel Lakhani is a 64 y.o. female who is referred by Dr Tavera for small bowel lesion.  She recently underwent laparoscopic extended right colectomy for T3N0 stage II colon cancer.  Intraoperatively, small bowel lesion was identified and pictures were taken which I have reviewed.  She underwent follow-up CT enterography which did not show any concerning abnormalities at this site.  On my review of the imaging, this has the appearance of a benign lesion, likely lymphangioma.    Review of Systems   Constitutional: Negative.    HENT:  Negative for ear discharge, nosebleeds and sore throat.    Eyes:  Negative for discharge.   Respiratory:  Negative for shortness of breath.    Cardiovascular:  Negative for chest pain.   Gastrointestinal:  Negative for abdominal pain, nausea and vomiting.   Endocrine: Negative.    Genitourinary:  Negative for dysuria and flank pain.   Musculoskeletal:  Negative for gait problem.   Skin:  Negative for rash.   Neurological:  Negative for speech difficulty, weakness and headaches.   Hematological:  Negative for adenopathy.   Psychiatric/Behavioral:  Negative for behavioral problems, confusion and hallucinations.         Past Medical History:   Diagnosis Date    Anxiety and depression     H/O transesophageal echocardiography (FABIOLA) for monitoring 08/02/2024    CONCLUSIONS:  1. The left ventricular systolic function is normal, with a visually estimated ejection fraction of 65%.  2. Left ventricular diastolic filling was indeterminate.  3. Left ventricular cavity size is decreased.  4. There is normal right ventricular global systolic function.  5. Aortic valve stenosis is not present.  6. Mild aortic valve regurgitation.    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      ezetimibe (Zetia) 10 mg tablet Take 1 tablet (10 mg) by mouth once daily. 90 tablet 3    gabapentin (Neurontin) 100 mg capsule Take one capsule by mouth starting three days before surgery at bedtime. (Patient not taking: Reported on 10/8/2024) 3 capsule 0    metoprolol tartrate (Lopressor) 25 mg tablet Take 0.5 tablets (12.5 mg) by mouth 2 times a day. 90 tablet 3    mv-mn/folic ac/calcium/vit K1 (WOMEN'S 50 PLUS MULTIVITAMIN ORAL) Take 1 tablet by mouth once daily.      ondansetron ODT (Zofran-ODT) 4 mg disintegrating tablet Take 1 tablet (4 mg) by mouth every 8 hours if needed for nausea or vomiting. 20 tablet 0    oxyCODONE (Roxicodone) 5 mg immediate release tablet Take 1 tablet (5 mg) by mouth every 4 hours if needed for moderate pain (4 - 6). (Patient not taking: Reported on 10/8/2024) 15 tablet 0    traZODone (Desyrel) 100 mg tablet Take 1 tablet (100 mg) by mouth once daily. 90 tablet 3    venlafaxine XR (Effexor-XR) 37.5 mg 24 hr capsule Take 1 capsule (37.5 mg) by mouth once daily. Do not crush or chew. 30 capsule 11     No current facility-administered medications on file prior to visit.      Allergies   Allergen Reactions    Adhesive Tape-Silicones Itching    Ampicillin Rash    Cefdinir Rash    Sevzmhis-1-Aw8 Antimigraine Agents Anxiety      Social  History     Socioeconomic History    Marital status:      Spouse name: Not on file    Number of children: Not on file    Years of education: Not on file    Highest education level: Not on file   Occupational History    Not on file   Tobacco Use    Smoking status: Former     Current packs/day: 0.00     Average packs/day: 0.5 packs/day for 15.0 years (7.5 ttl pk-yrs)     Types: Cigarettes     Quit date: 7/15/2024     Years since quittin.2     Passive exposure: Past    Smokeless tobacco: Never   Vaping Use    Vaping status: Never Used   Substance and Sexual Activity    Alcohol use: Yes     Alcohol/week: 7.0 standard drinks of alcohol     Types: 7 Glasses of wine per week     Comment: witn with dinner    Drug use: Never    Sexual activity: Defer   Other Topics Concern    Not on file   Social History Narrative    Not on file     Social Determinants of Health     Financial Resource Strain: Low Risk  (2024)    Overall Financial Resource Strain (CARDIA)     Difficulty of Paying Living Expenses: Not hard at all   Food Insecurity: No Food Insecurity (10/3/2024)    Hunger Vital Sign     Worried About Running Out of Food in the Last Year: Never true     Ran Out of Food in the Last Year: Never true   Transportation Needs: No Transportation Needs (2024)    PRAPARE - Transportation     Lack of Transportation (Medical): No     Lack of Transportation (Non-Medical): No   Physical Activity: Not on file   Stress: No Stress Concern Present (10/3/2024)    [employer] - Occupational Stress Questionnaire     Feeling of Stress : Not at all   Social Connections: Feeling Socially Integrated (2024)    OASIS : Social Isolation     Frequency of experiencing loneliness or isolation: Never   Intimate Partner Violence: Not At Risk (10/3/2024)    Humiliation, Afraid, Rape, and Kick questionnaire     Fear of Current or Ex-Partner: No     Emotionally Abused: No     Physically Abused: No      Sexually Abused: No   Housing Stability: Low Risk  (9/12/2024)    Housing Stability Vital Sign     Unable to Pay for Housing in the Last Year: No     Number of Times Moved in the Last Year: 0     Homeless in the Last Year: No      Family History   Problem Relation Name Age of Onset    Lupus Mother      Sjogren's syndrome Mother      Fibromyalgia Mother      Cancer Father JASON Mercado     Hearing loss Father JASON Mercado     Cancer Maternal Grandmother Catia Santa           Objective     Vitals:    10/09/24 1125   BP: 122/79   Pulse: 79   Resp: 16   Temp: 36.6 °C (97.9 °F)   SpO2: 99%            Physical Exam  Constitutional:       Appearance: Normal appearance.   HENT:      Head: Atraumatic.      Nose: Nose normal.   Eyes:      Extraocular Movements: Extraocular movements intact.      Conjunctiva/sclera: Conjunctivae normal.   Cardiovascular:      Rate and Rhythm: Normal rate.   Pulmonary:      Effort: Pulmonary effort is normal.   Abdominal:      Palpations: Abdomen is soft.      Tenderness: There is no abdominal tenderness.   Musculoskeletal:         General: Normal range of motion.   Skin:     Findings: No rash.   Neurological:      General: No focal deficit present.      Mental Status: She is alert.   Psychiatric:         Behavior: Behavior normal.          Lab Results   Component Value Date    WBC 6.4 10/03/2024    HGB 13.1 10/03/2024    HCT 39.1 10/03/2024     10/03/2024     Lab Results   Component Value Date     10/07/2024    K 4.1 10/07/2024     10/07/2024    CO2 21 10/07/2024    BUN 15 10/07/2024    CREATININE 0.62 10/07/2024    EGFR >90 10/07/2024    CALCIUM 9.8 10/07/2024    ALBUMIN 4.2 10/07/2024    PROT 6.9 10/07/2024    AST 19 10/07/2024    ALT 11 10/07/2024    BILITOT 0.5 10/07/2024     Lab Results   Component Value Date    CEA 2.3 10/07/2024    CEA 3.4 06/13/2024        === 11/27/15 ===    - Impression -  1.  Thickening and increased signal in the inferior capsule of  the  axillary recess and edema in the rotator interval suggestive of  adhesive capsulitis.  2.  Degenerative fraying and fibrillation of the glenoid labrum.  3.  Mild acromioclavicular arthrosis.  4.  Mild supraspinatus and infraspinatus tendinosis.  Minimal  subscapularis tendinosis. Degenerative cystic changes in the greater  tuberosity of the humerus.    I personally reviewed the study and resident interpretation. I agree  with the findings as stated. This study was analyzed and interpreted  at [place], [place].  CT enterography w contrast    Result Date: 10/7/2024  Interpreted By:  Cat Reaves, STUDY: CT ENTEROGRAPHY WITH  IV CONTRAST;  10/2/2024 12:26 pm   INDICATION: Signs/Symptoms:suspected small bowel mass seen intraoperatively at the ligament of trietz, not visualized on enteroscoy. Metallic clip was placed at distal extent for marking.   COMPARISON: None available   ACCESSION NUMBER(S): [id number]   ORDERING CLINICIAN: LOGAN MONET   TECHNIQUE: CT abdomen and pelvis with CT Enterography protocol, including single enteric-phase CT after the uneventful administration of  75 mL intravenous contrast (Omnipaque 350). Patient given 1,350 mL neutral oral contrast (Volumen) for bowel distension. Coronal and sagittal reformatted images reviewed.   FINDINGS: LOWER CHEST: No acute airspace disease.   BONES: No acute skeletal findings.   STOMACH / DUODENUM: Grossly normal by CT which has limited sensitivity and specificity for the stomach and duodenum.   SMALL BOWEL/COLON:  Anastomotic sutures in the ascending colon with associated area wall thickening which may be postoperative in nature. Small and large bowel are nondilated. No suspicious bowel wall thickening is seen. No signs of obstruction. No mesenteric edema or lymphadenopathy. No significant pericolonic fat stranding or fluid collection is seen. No mesenteric edema or lymphadenopathy. Few scattered colonic  diverticula but no CT evidence for acute diverticulitis. No enteral enteric or enterocutaneous fistula are seen. The perirectal fat without inflammatory changes.   APPENDIX:  The appendix is not seen and may have been surgically removed.   LIVER:  Coarse appearance of the liver parenchyma. No focal masses. Liver is normal in size.   SPLEEN: Normal in size. No focal lesions.   PANCREAS: Normal. No CT evidence of acute or chronic pancreatitis. No duct dilation. No mass.   GALLBLADDER:  The gallbladder is contracted therefore evaluation is limited. No radiodense gallbladder calculi. No pericholecystic free fluid.   BILE DUCTS:  Normal. No biliary duct dilation.   ADRENAL GLANDS: Normal. No nodule or mass.   KIDNEYS AND URETERS: Normal.  No hydronephrosis on either side.  No mass.  Symmetric enhancement.  No infarct or CT evidence of acute pyelonephritis.  No substantial radiodense stone.  Tiny stones and radiolucent stones could be occult on CT.   LYMPH NODES:  No adenopathy, intraperitoneal, retroperitoneal, pelvic or otherwise   RETROPERITONEUM:  Normal.  No acute hemorrhage or inflammatory change. Lymph nodes in a separate dedicated section.   OMENTUM, MESENTERY AND PERITONEAL SPACES: Free intraperitoneal air:  Negative Free intraperitoneal fluid:  Negative Abscess:  Negative Other:  n/a.  (Lymph nodes in a separate dedicated section.)   URINARY BLADDER:  Normal. No wall thickening, large diverticula, radiodense stone or surrounding inflammatory change.   PELVIS:  The uterus is present. There is a round mass within the left aspect of the fundus which may represent a fibroid but incompletely characterized in this exam. No adnexal masses or free fluid.   VASCULATURE:  Aorta normal in caliber. No aneurysm or dissection. IVC is patent.   ABDOMINAL WALL: Hernia:  Negative Other:  No acute or contributory abnormality.       1. Postoperative changes related to prior partial ascending colectomy. Ascending colonic mucosal  wall thickening adjacent to the anastomotic sutures which may be postoperative in nature. Clinical correlation and close attention on follow-up studies recommended.   2. Uterine mass which may represent a fibroid, however, incompletely characterized in this study. Correlation with pelvic ultrasound findings recommended.   Signed by: Cat Reaves 10/7/2024 10:39 AM Dictation workstation:   [id number]    Enteroscopy    Result Date: 9/16/2024  Table formatting from the original result was not included. Impression The upper third of the esophagus, middle third of the esophagus, lower third of the esophagus and Z-line appeared normal. The cardia, fundus of the stomach and body of the stomach appeared normal. Single ulcer in the antrum Performed forceps biopsies in the body of the stomach and antrum to rule out H. pylori The duodenal bulb, 1st part of the duodenum, 2nd part of the duodenum and 3rd part of the duodenum appeared normal. Placed 1 clip successfully to identify distal extent of exam. ~10cm area distal to the hemoclip was closely examined with no evidence of mucosal mass Findings The upper third of the esophagus, middle third of the esophagus, lower third of the esophagus and Z-line appeared normal. Z-line is 40 cm from the incisors. The cardia, fundus of the stomach and body of the stomach appeared normal. Single small, superficial ulcer in the antrum Performed multiple forceps biopsies in the body of the stomach and antrum to rule out H. pylori The duodenal bulb, 1st part of the duodenum, 2nd part of the duodenum and 3rd part of the duodenum appeared normal. Placed 1 clip successfully to identify distal extent of exam. ~10cm area distal to the hemoclip was closely examined with no evidence of mucosal mass Recommendation Follow up with primary gastroenterologist Follow up with PCP Recommend outpatient CT enterography for evaluation of suspected small bowel mass. Further work-up based on imaging Return  patient to hospital floor for ongoing care  Indication Neoplasm of uncertain behavior of jejunum Staff Staff Role Shazia Pierce MD Proceduralist Medications See Anesthesia Record. Preprocedure A history and physical has been performed, and patient medication allergies have been reviewed. The patient's tolerance of previous anesthesia has been reviewed. The risks and benefits of the procedure and the sedation options and risks were discussed with the patient. All questions were answered and informed consent obtained. Details of the Procedure The patient underwent monitored anesthesia care, which was administered by an anesthesia professional. The patient's blood pressure, heart rate, level of consciousness, oxygen, respirations, ECG and ETCO2 were monitored throughout the procedure. The scope was introduced through the mouth and advanced to the jejunum. Tattoo was not placed to stefan the distal extent of the exam. Fluoroscopy was not used. Retroflexion was performed in the cardia. The patient experienced no blood loss. The procedure was not difficult. The patient tolerated the procedure well. There were no apparent adverse events. Events Procedure Events Event Event Time ENDO SCOPE IN TIME 9/16/2024 10:23 AM ENDO SCOPE OUT TIME 9/16/2024 10:39 AM Specimens ID Type Source Tests Collected by Time 1 : BX- stomach antrum/body Tissue STOMACH ANTRUM BIOPSY SURGICAL PATHOLOGY EXAM José Miguel Sánchez RN 9/16/2024 1034 Procedure Location [facility] [address] [facility] [id number] [phone] Referring Provider Jessica Campos MD, MS Procedure Provider Shazia Pierce MD       Assessment/Plan     64-year-old woman with recent history of colon cancer as well as atrial myxoma who was incidentally discovered to have a proximal small bowel lesion.  On my review of her intraoperative pictures, I believe this is most likely benign lymphangioma.  It is also reassuring that this  did not have any concerning features on CT enterography.  I will order an MRI enterography in 6 months for follow-up.  If there are no concerning features at that time, she will not require any additional imaging or follow-up specifically for the small bowel lesion.    Monroe Carrero MD

## 2024-10-29 LAB — SCAN RESULT: NORMAL

## 2024-10-30 ENCOUNTER — TELEPHONE (OUTPATIENT)
Dept: HEMATOLOGY/ONCOLOGY | Facility: CLINIC | Age: 64
End: 2024-10-30
Payer: COMMERCIAL

## 2024-11-05 LAB
AP SUMMARY REPORT: NORMAL
SCAN RESULT: NORMAL

## 2025-01-12 NOTE — PROGRESS NOTES
Note sent to provider   
4-year-old male with no significant PMH or PSH who presents to the ER after a ground-level fall in which she struck his chin.  Patient immediately began crying.  Patient acting at baseline mental status.  No vomiting.

## 2025-01-23 ENCOUNTER — OFFICE VISIT (OUTPATIENT)
Dept: HEMATOLOGY/ONCOLOGY | Facility: CLINIC | Age: 65
End: 2025-01-23
Payer: COMMERCIAL

## 2025-01-23 VITALS
RESPIRATION RATE: 17 BRPM | HEART RATE: 71 BPM | SYSTOLIC BLOOD PRESSURE: 123 MMHG | OXYGEN SATURATION: 99 % | TEMPERATURE: 97.5 F | WEIGHT: 156.31 LBS | BODY MASS INDEX: 23.74 KG/M2 | DIASTOLIC BLOOD PRESSURE: 84 MMHG

## 2025-01-23 DIAGNOSIS — C18.2 MALIGNANT NEOPLASM OF ASCENDING COLON (MULTI): Primary | ICD-10-CM

## 2025-01-23 DIAGNOSIS — Z08 ENCOUNTER FOR FOLLOW-UP SURVEILLANCE OF COLON CANCER: ICD-10-CM

## 2025-01-23 DIAGNOSIS — Z85.038 ENCOUNTER FOR FOLLOW-UP SURVEILLANCE OF COLON CANCER: ICD-10-CM

## 2025-01-23 LAB
ALBUMIN SERPL BCP-MCNC: 4.4 G/DL (ref 3.4–5)
ALP SERPL-CCNC: 45 U/L (ref 33–136)
ALT SERPL W P-5'-P-CCNC: 21 U/L (ref 7–45)
ANION GAP SERPL CALC-SCNC: 12 MMOL/L (ref 10–20)
AST SERPL W P-5'-P-CCNC: 24 U/L (ref 9–39)
BASOPHILS # BLD AUTO: 0.03 X10*3/UL (ref 0–0.1)
BASOPHILS NFR BLD AUTO: 0.5 %
BILIRUB SERPL-MCNC: 0.5 MG/DL (ref 0–1.2)
BUN SERPL-MCNC: 13 MG/DL (ref 6–23)
CALCIUM SERPL-MCNC: 9.9 MG/DL (ref 8.6–10.3)
CEA SERPL-MCNC: 2.2 UG/L
CHLORIDE SERPL-SCNC: 102 MMOL/L (ref 98–107)
CO2 SERPL-SCNC: 28 MMOL/L (ref 21–32)
CREAT SERPL-MCNC: 0.71 MG/DL (ref 0.5–1.05)
EGFRCR SERPLBLD CKD-EPI 2021: >90 ML/MIN/1.73M*2
EOSINOPHIL # BLD AUTO: 0.05 X10*3/UL (ref 0–0.7)
EOSINOPHIL NFR BLD AUTO: 0.9 %
ERYTHROCYTE [DISTWIDTH] IN BLOOD BY AUTOMATED COUNT: 13.5 % (ref 11.5–14.5)
GLUCOSE SERPL-MCNC: 93 MG/DL (ref 74–99)
HCT VFR BLD AUTO: 43.7 % (ref 36–46)
HGB BLD-MCNC: 15.2 G/DL (ref 12–16)
IMM GRANULOCYTES # BLD AUTO: 0.02 X10*3/UL (ref 0–0.7)
IMM GRANULOCYTES NFR BLD AUTO: 0.4 % (ref 0–0.9)
LYMPHOCYTES # BLD AUTO: 1.45 X10*3/UL (ref 1.2–4.8)
LYMPHOCYTES NFR BLD AUTO: 25.8 %
MCH RBC QN AUTO: 33.3 PG (ref 26–34)
MCHC RBC AUTO-ENTMCNC: 34.8 G/DL (ref 32–36)
MCV RBC AUTO: 96 FL (ref 80–100)
MONOCYTES # BLD AUTO: 0.61 X10*3/UL (ref 0.1–1)
MONOCYTES NFR BLD AUTO: 10.8 %
NEUTROPHILS # BLD AUTO: 3.47 X10*3/UL (ref 1.2–7.7)
NEUTROPHILS NFR BLD AUTO: 61.6 %
NRBC BLD-RTO: 0 /100 WBCS (ref 0–0)
PLATELET # BLD AUTO: 214 X10*3/UL (ref 150–450)
POTASSIUM SERPL-SCNC: 4 MMOL/L (ref 3.5–5.3)
PROT SERPL-MCNC: 7.1 G/DL (ref 6.4–8.2)
RBC # BLD AUTO: 4.56 X10*6/UL (ref 4–5.2)
SODIUM SERPL-SCNC: 138 MMOL/L (ref 136–145)
WBC # BLD AUTO: 5.6 X10*3/UL (ref 4.4–11.3)

## 2025-01-23 PROCEDURE — 85025 COMPLETE CBC W/AUTO DIFF WBC: CPT | Performed by: STUDENT IN AN ORGANIZED HEALTH CARE EDUCATION/TRAINING PROGRAM

## 2025-01-23 PROCEDURE — 82378 CARCINOEMBRYONIC ANTIGEN: CPT | Performed by: STUDENT IN AN ORGANIZED HEALTH CARE EDUCATION/TRAINING PROGRAM

## 2025-01-23 PROCEDURE — 99214 OFFICE O/P EST MOD 30 MIN: CPT | Performed by: STUDENT IN AN ORGANIZED HEALTH CARE EDUCATION/TRAINING PROGRAM

## 2025-01-23 PROCEDURE — 80053 COMPREHEN METABOLIC PANEL: CPT | Performed by: STUDENT IN AN ORGANIZED HEALTH CARE EDUCATION/TRAINING PROGRAM

## 2025-01-23 ASSESSMENT — PAIN SCALES - GENERAL: PAINLEVEL_OUTOF10: 0-NO PAIN

## 2025-01-23 NOTE — PROGRESS NOTES
Patient here for follow up with Waleska Best NP.  Patient being followed for Colon Cancer.    Patient's medications and allergies reviewed.  Patient denies pain.      Patient had her signatera drawn at home a few weeks ago.    Patient has no further concerns.

## 2025-02-09 NOTE — PROGRESS NOTES
Patient ID: Maribel Lakhani is a 64 y.o. female.  Referring Physician: Juliana Bullock MD PhD  [address] [place] [address]  Primary Care Provider: Albert Berry MD  Surgeon: Yann Cotter  Visit Type: Follow Up    Cancer History:  DIAGNOSIS: Transverse Colon cancer    STAGING: pT3 pN0 M0    CURRENT SITES OF DISEASE:     MOLECULAR/GENOMIC:  MMR-proficient    ctDNA signatera:  10/7/24: negative (0)  1/6/25: negative (0)    TUMOR MARKER:   6/2024: CEA 3.4  10/7/24: CEA 2.3  1/23/25: CEA 2.2    CURRENT TREATMENT:   Surveillance    PRIOR TREATMENT:   9/11/24: Laparoscopic Extended R colectomy with ileocolic anastomosis    HISTORY:   6/13/24: C-scope showed transverse colon polyp, rectal polyp x2 and transverse colon mass. Path of mass showed invasive moderately differentiated adenocarcinoma, MMR protein expression intact  7/12/24: CT showed no metastatic disease, but with L atrial mass.   8/2/24: S/p excision of L atrial myxoma. Pathology confirmed cardiac myxoma  9/11/24: S/p  Laparoscopic Extended R colectomy with ileocolic anastomosis. Intraoperatively found to have a 1 to 2 cm lesion on the serosa of the very proximal jejunum immediately at the ligament of Treitz.  This was soft and not an obvious malignancy but a possible calcification.  Given the location biopsy was not attempted at this time. Final pathology showed pT3 pN0 Mx, margins negative, no perforation, LV, PNI. There were several tumor buds noted (score: High).     PMH: Anxiety, HLD  FH: father with cancer, maternal grandmother with cancer  SH: Previously smoked cigarettes, occasional EtOH, no illicits    Subjective:   Feeling well. Having no issues with bowel movements. Eating well. Good appetite.  Wondering if she has to pay for Signatera since she received an EOB that the test is not covered.    No fevers, chills, chest pain, shortness of breath, abdominal pain, nausea, vomiting, diarrhea, or rashes.    ROS as above. Remainder of  10-point review of systems elicited and negative.    Objective:  /84 (BP Location: Right arm, Patient Position: Sitting, BP Cuff Size: Adult)   Pulse 71   Temp 36.4 °C (97.5 °F) (Temporal)   Resp 17   Wt 70.9 kg (156 lb 4.9 oz)   LMP  (LMP Unknown)   SpO2 99%   BMI 23.74 kg/m²     PE:  Gen: A&O, NAD  Head: Normocephalic, atraumatic  Eyes: no scleral icterus  ENT: mucous membranes moist, no oropharyngeal lesions  Resp: Lungs CTAB  Cardiac: Normal rate, regular rhythm, no murmurs appreciated  Abdomen: Soft, nondistended, nontender, +BS  Neuro: CNII-XII grossly intact  Psych: appropriate mood & affect  Skin: warm, dry, no apparent rashes     CT C/A/P 7/12/24:  A large filling defect seen in the left atrium which may represent  myxoma versus thrombus. Echocardiogram advised.      No features of metastatic disease to the chest. Reported history of  colonic mass. Stool obscures evaluation of the colon. Slight hepatic  steatosis. Senescent changes detected.    Surgical pathology 9/11/24:  A.  Segment of terminal ileum, appendix and colon, resection:  Invasive colonic adenocarcinoma, moderately differentiated.  Twenty-one lymph nodes, negative for malignancy (0/21).  See synoptic report and note.     NOTE: Please see previous report [id number] for mismatch repair protein immunohistochemistry results    Tumor Site  Transverse colon   Histologic Type  Adenocarcinoma   Histologic Grade  G2, moderately differentiated   Tumor Size  Greatest dimension (Centimeters): 2.3 cm   Tumor Extent  Invades through muscularis propria into the pericolonic or perirectal tissue   Macroscopic Tumor Perforation  Not identified   Lymphovascular Invasion  Not identified   Perineural Invasion  Not identified   Number of Tumor Buds  12 per 'hotspot' field   Tumor Bud Score  High (10 or more)   Type of Polyp in which Invasive Carcinoma Arose  The adjcent mucosa shows features of a sessile serrated polyp.   Treatment Effect  No known  presurgical therapy   MARGINS   Margin Status for Invasive Carcinoma  All margins negative for invasive carcinoma   Closest Margin(s) to Invasive Carcinoma  Distal   Distance from Invasive Carcinoma to Closest Margin  6.0 cm   Distance from Invasive Carcinoma to Distal Margin  Not applicable   Margin Status for Non-Invasive Tumor  All margins negative for high-grade dysplasia / intramucosal carcinoma and low-grade dysplasia       ECOG Performance Status: 1     Assessment & Plan:   Maribel Lakhani is a 64 y.o. female with MMR-proficient transverse colon cancer, pT3 pN0, Stage II with no high risk features dx 6/2024 and s/p resection on 9/11/24.     I discussed with pt that this is a Stage IIA colon cancer. Most of the risk factors for recurrence are absent on her pathology specimen, but there were tumor buds noted. I discussed with her that tumor budding may increase the risk of her cancer coming back, but the data supporting it as a major risk factor for recurrence are limited. I discussed that based on this, we could consider adjuvant chemotherapy, but it is unclear how much benefit she will receive from chemotherapy in this setting. Alternatively, we could adopt a close surveillacne plan. She is in agreement to proceed with surveillance.     Plan:   Stage IIA transverse colon cancer  - Plan for labs including CEA and Signatera q3mo and CT q6mo x2 years followed by labs q4-6mo and CT annually years 3-5  - Will need C-scope 9/2025  - Repeat labs including Signatera  in 3 months prior to her follow up   - CT c/a/p in 3 months at [place]  - [facility] 3mo to review CT scan with a med onc at [place] since Dr. Bullock is no longer at

## 2025-02-11 ENCOUNTER — OFFICE VISIT (OUTPATIENT)
Dept: CARDIOLOGY | Facility: CLINIC | Age: 65
End: 2025-02-11
Payer: COMMERCIAL

## 2025-02-11 ENCOUNTER — HOSPITAL ENCOUNTER (OUTPATIENT)
Dept: CARDIOLOGY | Facility: CLINIC | Age: 65
Discharge: HOME | End: 2025-02-11
Payer: COMMERCIAL

## 2025-02-11 VITALS
BODY MASS INDEX: 23.99 KG/M2 | OXYGEN SATURATION: 99 % | HEART RATE: 65 BPM | WEIGHT: 157.9 LBS | DIASTOLIC BLOOD PRESSURE: 86 MMHG | SYSTOLIC BLOOD PRESSURE: 129 MMHG

## 2025-02-11 DIAGNOSIS — D15.1 MYXOMA OF HEART: ICD-10-CM

## 2025-02-11 DIAGNOSIS — E78.00 HIGH CHOLESTEROL: ICD-10-CM

## 2025-02-11 DIAGNOSIS — Z01.810 PREOP CARDIOVASCULAR EXAM: ICD-10-CM

## 2025-02-11 DIAGNOSIS — I51.89 LEFT ATRIAL MASS: Primary | ICD-10-CM

## 2025-02-11 DIAGNOSIS — I25.10 ATHEROSCLEROSIS OF NATIVE CORONARY ARTERY OF NATIVE HEART WITHOUT ANGINA PECTORIS: ICD-10-CM

## 2025-02-11 PROCEDURE — 99214 OFFICE O/P EST MOD 30 MIN: CPT | Mod: 25 | Performed by: NURSE PRACTITIONER

## 2025-02-11 PROCEDURE — 99214 OFFICE O/P EST MOD 30 MIN: CPT | Performed by: NURSE PRACTITIONER

## 2025-02-11 PROCEDURE — 1036F TOBACCO NON-USER: CPT | Performed by: NURSE PRACTITIONER

## 2025-02-11 PROCEDURE — 93306 TTE W/DOPPLER COMPLETE: CPT

## 2025-02-11 ASSESSMENT — ENCOUNTER SYMPTOMS
EYES NEGATIVE: 1
PSYCHIATRIC NEGATIVE: 1
MYALGIAS: 1
HEMATOLOGIC/LYMPHATIC NEGATIVE: 1
CARDIOVASCULAR NEGATIVE: 1
GASTROINTESTINAL NEGATIVE: 1
NEUROLOGICAL NEGATIVE: 1
RESPIRATORY NEGATIVE: 1
CONSTITUTIONAL NEGATIVE: 1
ENDOCRINE NEGATIVE: 1

## 2025-02-11 NOTE — PROGRESS NOTES
Referred by Dr. Altman ref. provider found for Follow-up (Routine.  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 The patient is seen in collaboration with Dr. Dumont. Difficulty getting stamina. Has numbness around her incision. Had colon surgery. Denies shortness of breath occasional heart fluttering.     Review of Systems   Constitutional: Negative.   HENT: Negative.     Eyes: Negative.    Cardiovascular: Negative.    Respiratory: Negative.     Endocrine: Negative.    Hematologic/Lymphatic: Negative.    Skin: Negative.    Musculoskeletal:  Positive for myalgias.   Gastrointestinal: Negative.    Neurological: Negative.    Psychiatric/Behavioral: Negative.        Past Medical History:  She has a past medical history of Anxiety (2021), Anxiety and depression, Colon cancer (Multi), H/O transesophageal echocardiography (FABIOLA) for monitoring (08/02/2024), History of cardiac cath (07/26/2024), Hyperlipidemia, Malignant neoplasm of transverse colon (Multi), and Myxoma.    Past Surgical History:  She has a past surgical history that includes Foot surgery (Left); Mouth surgery (1984); Dilation and curettage of uterus; Cardiac catheterization (N/A, 07/26/2024); Excision atrial myxoma (08/02/2024); Eye surgery (1968); and Tonsillectomy (1968).      Social History:  She reports that she quit smoking about 6 months ago. Her smoking use included cigarettes. She has a 7.5 pack-year smoking history. She has been exposed to tobacco smoke. She has never used smokeless tobacco. She reports current alcohol use of about 4.0 standard drinks of alcohol per week. She reports that she does not use drugs.    Family History:  Family History   Problem Relation Name Age of Onset    Lupus Mother      Sjogren's syndrome Mother      Fibromyalgia Mother       Cancer Father JASON Mercado     Hearing loss Father JASON Mercado     Hernia Father JASON Mercado     Cancer Maternal Grandmother Catia Santa         Allergies:  Adhesive tape-silicones, Ampicillin, Cefdinir, and [id number] antimigraine agents    Outpatient Medications:  Current Outpatient Medications   Medication Instructions    acetaminophen (TYLENOL) 650 mg, oral, Every 6 hours PRN    ezetimibe (ZETIA) 10 mg, oral, Daily    metoprolol tartrate (LOPRESSOR) 12.5 mg, oral, 2 times daily    mv-mn/folic ac/calcium/vit K1 (WOMEN'S 50 PLUS MULTIVITAMIN ORAL) 1 tablet, Daily    traZODone (DESYREL) 100 mg, oral, Daily    venlafaxine XR (EFFEXOR-XR) 37.5 mg, oral, Daily, Do not crush or chew.        Last Recorded Vitals:  Vitals:    02/11/25 0957   BP: 129/86   Pulse: 65   SpO2: 99%   Weight: 71.6 kg (157 lb 14.4 oz)       Physical Exam:  Physical Exam  Vitals reviewed.   HENT:      Head: Normocephalic.      Nose: Nose normal.   Eyes:      Pupils: Pupils are equal, round, and reactive to light.   Cardiovascular:      Rate and Rhythm: Normal rate and regular rhythm.   Pulmonary:      Effort: Pulmonary effort is normal.      Breath sounds: Normal breath sounds.   Abdominal:      General: Abdomen is flat.      Palpations: Abdomen is soft.   Musculoskeletal:         General: Normal range of motion.      Cervical back: Normal range of motion.   Skin:     General: Skin is warm and dry.   Neurological:      General: No focal deficit present.      Mental Status: He is alert and oriented to person, place, and time.   Psychiatric:         Mood and Affect: Mood normal.            Last Labs:  CBC -  Lab Results   Component Value Date    WBC 5.6 01/23/2025    HGB 15.2 01/23/2025    HCT 43.7 01/23/2025    MCV 96 01/23/2025     01/23/2025       CMP -  Lab Results   Component Value Date    CALCIUM 9.9 01/23/2025    PHOS 4.6 08/14/2024    PROT 7.1 01/23/2025    ALBUMIN 4.4 01/23/2025    AST 24 01/23/2025    ALT  21 01/23/2025    ALKPHOS 45 01/23/2025    BILITOT 0.5 01/23/2025       LIPID PANEL -   Lab Results   Component Value Date    CHOL 191 08/28/2023    TRIG 71 08/28/2023    HDL 64.3 08/28/2023    CHHDL 3.0 08/28/2023    LDLF 113 (H) 08/28/2023    VLDL 14 08/28/2023       RENAL FUNCTION PANEL -   Lab Results   Component Value Date    GLUCOSE 93 01/23/2025     01/23/2025    K 4.0 01/23/2025     01/23/2025    CO2 28 01/23/2025    ANIONGAP 12 01/23/2025    BUN 13 01/23/2025    CREATININE 0.71 01/23/2025    CALCIUM 9.9 01/23/2025    PHOS 4.6 08/14/2024    ALBUMIN 4.4 01/23/2025        Lab Results   Component Value Date    HGBA1C 4.9 07/31/2024       Last Cardiology Tests:  ECG:    Echo:  Anesthesia Intraoperative Transesophageal Echocardiogram 08/02/2024  1. The left ventricular systolic function is normal, with a visually estimated ejection fraction of 65%.   2. Left ventricular diastolic filling was indeterminate.   3. Left ventricular cavity size is decreased.   4. There is normal right ventricular global systolic function.   5. Aortic valve stenosis is not present.   6. Mild aortic valve regurgitation.    Echocardiogram 7/15/2024  1. The left ventricular systolic function is normal, with a Santoyo's biplane calculated ejection fraction of 65%.   2. There is normal right ventricular global systolic function.   3. The left atrium is mildly dilated.   4. Large mobile, penduculated mass seen attached to the atrial aspect of the anterior leaflet of the mitral valve measuring 6.3 x 2.4 cm, resulting in moderate to severe obstruction fo the mitral valve flow with mild mitral regurgitation. 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   5. Dr. Harding has been notified via EPIC secure message.             Ejection Fractions:  EF   Date/Time Value Ref Range Status   08/02/2024 07:15 AM 65 %        Cath:  Cardiac Catheterization Procedure 07/26/2024  1. Left main: no significant angiographic disease.   2. LAD: no  significant angiographic disease.   3. LCx: 10% ostial stenosis.   4. RCA: no significant angiographic disease.   5. LVEDP 14mmHg, no aortic stenosis on LV-Ao gradient.    Stress Test:    Cardiac Imaging:      Assessment/Plan   Mrs. Lakhani is a very pleasant 64 year old female with a history of anxiety, HLD and myxoma of the heart s/p Mini thoracotomy and excision of left atrial Myxoma (8/2/2024), she continues to do well from a cardiac standpoint. Heart rate and blood pressure are well controlled today     Plan   -call with any questions   -continue Metoprolol 12.5 mg twice a day and Zetia 10 mg daily   -will call to repeat echocardiogram   -follow up in six months           Aliya Oneal, APRN-CNP

## 2025-02-19 ENCOUNTER — APPOINTMENT (OUTPATIENT)
Dept: CARDIOLOGY | Facility: HOSPITAL | Age: 65
End: 2025-02-19
Payer: COMMERCIAL

## 2025-02-19 LAB
CHOLEST SERPL-MCNC: 282 MG/DL
CHOLEST/HDLC SERPL: 3.8 (CALC)
HDLC SERPL-MCNC: 74 MG/DL
LDLC SERPL CALC-MCNC: 184 MG/DL (CALC)
NONHDLC SERPL-MCNC: 208 MG/DL (CALC)
TRIGL SERPL-MCNC: 111 MG/DL
TSH SERPL-ACNC: 1.01 MIU/L (ref 0.4–4.5)

## 2025-02-21 ENCOUNTER — APPOINTMENT (OUTPATIENT)
Dept: PRIMARY CARE | Facility: CLINIC | Age: 65
End: 2025-02-21
Payer: COMMERCIAL

## 2025-02-21 ENCOUNTER — APPOINTMENT (OUTPATIENT)
Dept: CARDIOLOGY | Facility: CLINIC | Age: 65
End: 2025-02-21
Payer: COMMERCIAL

## 2025-02-21 VITALS
WEIGHT: 159.4 LBS | BODY MASS INDEX: 25.02 KG/M2 | HEART RATE: 74 BPM | SYSTOLIC BLOOD PRESSURE: 124 MMHG | DIASTOLIC BLOOD PRESSURE: 80 MMHG | HEIGHT: 67 IN | OXYGEN SATURATION: 97 % | TEMPERATURE: 98.1 F

## 2025-02-21 DIAGNOSIS — F51.01 PRIMARY INSOMNIA: ICD-10-CM

## 2025-02-21 DIAGNOSIS — F32.1 CURRENT MODERATE EPISODE OF MAJOR DEPRESSIVE DISORDER WITHOUT PRIOR EPISODE (MULTI): ICD-10-CM

## 2025-02-21 DIAGNOSIS — E78.00 HIGH CHOLESTEROL: Primary | ICD-10-CM

## 2025-02-21 DIAGNOSIS — R22.0 SWELLING OF LEFT SIDE OF FACE: ICD-10-CM

## 2025-02-21 DIAGNOSIS — C18.4 MALIGNANT NEOPLASM OF TRANSVERSE COLON (MULTI): ICD-10-CM

## 2025-02-21 DIAGNOSIS — M25.50 ARTHRALGIA, UNSPECIFIED JOINT: ICD-10-CM

## 2025-02-21 DIAGNOSIS — Z12.31 ENCOUNTER FOR SCREENING MAMMOGRAM FOR MALIGNANT NEOPLASM OF BREAST: ICD-10-CM

## 2025-02-21 PROCEDURE — 99214 OFFICE O/P EST MOD 30 MIN: CPT | Performed by: FAMILY MEDICINE

## 2025-02-21 PROCEDURE — 3008F BODY MASS INDEX DOCD: CPT | Performed by: FAMILY MEDICINE

## 2025-02-21 RX ORDER — ROSUVASTATIN CALCIUM 10 MG/1
10 TABLET, COATED ORAL DAILY
Qty: 100 TABLET | Refills: 3 | Status: SHIPPED | OUTPATIENT
Start: 2025-02-21 | End: 2026-03-28

## 2025-02-21 RX ORDER — TRAZODONE HYDROCHLORIDE 100 MG/1
100 TABLET ORAL NIGHTLY
Qty: 90 TABLET | Refills: 3 | Status: SHIPPED | OUTPATIENT
Start: 2025-02-21 | End: 2026-02-21

## 2025-02-21 RX ORDER — VENLAFAXINE HYDROCHLORIDE 75 MG/1
75 CAPSULE, EXTENDED RELEASE ORAL DAILY
Qty: 90 CAPSULE | Refills: 3 | Status: SHIPPED | OUTPATIENT
Start: 2025-02-21 | End: 2026-02-21

## 2025-02-21 RX ORDER — DICLOFENAC SODIUM 75 MG/1
75 TABLET, DELAYED RELEASE ORAL 2 TIMES DAILY PRN
Qty: 180 TABLET | Refills: 3 | Status: SHIPPED | OUTPATIENT
Start: 2025-02-21 | End: 2026-02-21

## 2025-02-21 ASSESSMENT — PATIENT HEALTH QUESTIONNAIRE - PHQ9
SUM OF ALL RESPONSES TO PHQ9 QUESTIONS 1 AND 2: 4
1. LITTLE INTEREST OR PLEASURE IN DOING THINGS: MORE THAN HALF THE DAYS
2. FEELING DOWN, DEPRESSED OR HOPELESS: MORE THAN HALF THE DAYS

## 2025-02-21 NOTE — ASSESSMENT & PLAN NOTE
Lipids initially MUCH improved on zetia -- but up HDL 74 and  2/2025.    ASCVD risk, 6.7%    Encourage starting crestor 10 -- declines asa 81 mg due to bruising.

## 2025-02-21 NOTE — PROGRESS NOTES
"Subjective   Patient ID: Maribel Lakhani is a 64 y.o. female who presents for Follow-up (6 month follow up pt is over due for mammo, pt has been having pain ,pt has sleeping problems ).    TCM communication performed 8/14/24.  DC Summary dated 8/8/24 reviewed.   Meds reconciled.     8/2/24 S/p excision of heart myxoma. ([facility])  9/11/24 s/p R colectomy, ileocolic anastomosis ([facility])  9/16/24 EGD (path benign, mild chronic inflammation)    Trazodone and effexor for anxiety/depression.   Stopped gabapentin for pain -- tolerated diclofenac in the past.     Quit smoking.  Has to get clearance from Dr Morales to go for colon surgery.     Sleeps well until up at 4:15 every night.   Hot flashes are back.     Objective   Visit Vitals  /80   Pulse 74   Temp 36.7 °C (98.1 °F)   Ht 1.705 m (5' 7.13\")   Wt 72.3 kg (159 lb 6.4 oz)   LMP  (LMP Unknown)   SpO2 97%   BMI 24.87 kg/m²   OB Status Postmenopausal   Smoking Status Former   BSA 1.85 m²      O: VSS AFEB Awake, Alert, NAD.  No intoxication, withdrawal, or sedation.  Chest CTA.  Heart RRR.  Ext no c/c/e.       Lab Results   Component Value Date    WBC 5.6 01/23/2025    HGB 15.2 01/23/2025    HCT 43.7 01/23/2025    MCV 96 01/23/2025     01/23/2025       Chemistry    Lab Results   Component Value Date/Time     01/23/2025 1116    K 4.0 01/23/2025 1116     01/23/2025 1116    CO2 28 01/23/2025 1116    BUN 13 01/23/2025 1116    CREATININE 0.71 01/23/2025 1116    Lab Results   Component Value Date/Time    CALCIUM 9.9 01/23/2025 1116    ALKPHOS 45 01/23/2025 1116    AST 24 01/23/2025 1116    ALT 21 01/23/2025 1116    BILITOT 0.5 01/23/2025 1116          Lab Results   Component Value Date    CHOL 282 (H) 02/18/2025    CHOL 191 08/28/2023    CHOL 214 (H) 02/16/2023     Lab Results   Component Value Date    HDL 74 02/18/2025    HDL 64.3 08/28/2023    HDL 63.0 02/16/2023     Lab Results   Component Value Date    LDLCALC 184 (H) 02/18/2025     Lab Results   Component " "Value Date    TRIG 111 02/18/2025    TRIG 71 08/28/2023    TRIG 107 02/16/2023     No components found for: \"CHOLHDL\"   Lab Results   Component Value Date    HGBA1C 4.9 07/31/2024       Assessment/Plan   Problem List Items Addressed This Visit       High cholesterol - Primary    Overview     7/26/24 LHC <30% plaque.  2/2025 ASCVD risk 6.7%         Current Assessment & Plan     Lipids initially MUCH improved on zetia -- but up HDL 74 and  2/2025.    ASCVD risk, 6.7%    Encourage starting crestor 10 -- declines asa 81 mg due to bruising.          Relevant Medications    rosuvastatin (Crestor) 10 mg tablet    Other Relevant Orders    Lipid panel    Comprehensive metabolic panel    CBC    Insomnia    Overview     Failed melatonin and ambien 2018.               Current Assessment & Plan     Sleeping well on trazodone.  But with early morning awakenings -- at 4 am.   Hopefully increasing effexor will help.          Joint pain    Relevant Medications    diclofenac (Voltaren) 75 mg EC tablet    Current moderate episode of major depressive disorder without prior episode (Multi)    Current Assessment & Plan     Increase effexor to 75 mg daily and continue trazodone.          Relevant Medications    venlafaxine XR (Effexor-XR) 75 mg 24 hr capsule    traZODone (Desyrel) 100 mg tablet    Swelling of left side of face    Overview     Onset 11/3/2023  No significant improvement despite excision of heart myxoma 8/2/24           Malignant neoplasm of transverse colon (Multi)    Overview     6/13/24 COLONOSCOPY ([facility]) Invasive adenoCA in transverse colon.   CEA low 3.4    9/11/24 s/p R colectomy, ileocolic anastomosis ([facility])         Current Assessment & Plan     Stable, continue follow up with colorectal [facility].           Other Visit Diagnoses       Encounter for screening mammogram for malignant neoplasm of breast        Relevant Orders    BI mammo bilateral screening tomosynthesis               "

## 2025-02-21 NOTE — Clinical Note
Thien -- results still pending on Echo from 2/11/25.  Just checking if you can see if it got stuck in the system.  Has follow up with Aliya in August.  EDELMIRA

## 2025-02-21 NOTE — ASSESSMENT & PLAN NOTE
Sleeping well on trazodone.  But with early morning awakenings -- at 4 am.   Hopefully increasing effexor will help.

## 2025-02-24 LAB
AORTIC VALVE MEAN GRADIENT: 2 MMHG
AORTIC VALVE PEAK VELOCITY: 1.01 M/S
AV PEAK GRADIENT: 4 MMHG
AVA (PEAK VEL): 3.62 CM2
AVA (VTI): 3.2 CM2
EJECTION FRACTION APICAL 4 CHAMBER: 61.3
EJECTION FRACTION: 63 %
LEFT ATRIUM VOLUME AREA LENGTH INDEX BSA: 18.8 ML/M2
LEFT VENTRICLE INTERNAL DIMENSION DIASTOLE: 3.59 CM (ref 3.5–6)
LEFT VENTRICULAR OUTFLOW TRACT DIAMETER: 2.23 CM
MITRAL VALVE E/A RATIO: 0.74
RIGHT VENTRICLE FREE WALL PEAK S': 10.79 CM/S
RIGHT VENTRICLE PEAK SYSTOLIC PRESSURE: 14.9 MMHG
TRICUSPID ANNULAR PLANE SYSTOLIC EXCURSION: 1.8 CM

## 2025-02-25 ENCOUNTER — TELEPHONE (OUTPATIENT)
Dept: CARDIOLOGY | Facility: HOSPITAL | Age: 65
End: 2025-02-25
Payer: COMMERCIAL

## 2025-02-25 NOTE — TELEPHONE ENCOUNTER
----- Message from Aliya Arguelles sent at 2/25/2025 10:38 AM EST -----  Please call and let her know her echo is normal   Thanks  ----- Message -----  From: Dianne Coppola MD  Sent: 2/25/2025   9:11 AM EST  To: CARRI Jimenez-JAYLYN      ----- Message -----  From: Leonie, Syngo - Cardiology Results In  Sent: 2/24/2025   4:38 PM EST  To: Dianne Coppola MD

## 2025-02-26 ENCOUNTER — HOSPITAL ENCOUNTER (OUTPATIENT)
Dept: RADIOLOGY | Facility: HOSPITAL | Age: 65
Discharge: HOME | End: 2025-02-26
Payer: COMMERCIAL

## 2025-02-26 VITALS — BODY MASS INDEX: 24.96 KG/M2 | WEIGHT: 159 LBS | HEIGHT: 67 IN

## 2025-02-26 DIAGNOSIS — Z12.31 ENCOUNTER FOR SCREENING MAMMOGRAM FOR MALIGNANT NEOPLASM OF BREAST: ICD-10-CM

## 2025-02-26 DIAGNOSIS — E78.00 HIGH CHOLESTEROL: ICD-10-CM

## 2025-02-26 PROCEDURE — 77067 SCR MAMMO BI INCL CAD: CPT | Performed by: RADIOLOGY

## 2025-02-26 PROCEDURE — 77063 BREAST TOMOSYNTHESIS BI: CPT | Performed by: RADIOLOGY

## 2025-02-26 PROCEDURE — 77067 SCR MAMMO BI INCL CAD: CPT

## 2025-02-26 RX ORDER — EZETIMIBE 10 MG/1
10 TABLET ORAL DAILY
Qty: 90 TABLET | Refills: 3 | Status: SHIPPED | OUTPATIENT
Start: 2025-02-26

## 2025-03-07 ASSESSMENT — DERMATOLOGY QUALITY OF LIFE (QOL) ASSESSMENT
RATE HOW BOTHERED YOU ARE BY EFFECTS OF YOUR SKIN PROBLEMS ON YOUR ACTIVITIES (EG, GOING OUT, ACCOMPLISHING WHAT YOU WANT, WORK ACTIVITIES OR YOUR RELATIONSHIPS WITH OTHERS): 0 - NEVER BOTHERED
RATE HOW BOTHERED YOU ARE BY SYMPTOMS OF YOUR SKIN PROBLEM (EG, ITCHING, STINGING BURNING, HURTING OR SKIN IRRITATION): 1
DATE THE QUALITY-OF-LIFE ASSESSMENT WAS COMPLETED: 67271
WHAT SINGLE SKIN CONDITION LISTED BELOW IS THE PATIENT ANSWERING THE QUALITY-OF-LIFE ASSESSMENT QUESTIONS ABOUT: NONE OF THE ABOVE
RATE HOW EMOTIONALLY BOTHERED YOU ARE BY YOUR SKIN PROBLEM (FOR EXAMPLE, WORRY, EMBARRASSMENT, FRUSTRATION): 0 - NEVER BOTHERED
RATE HOW EMOTIONALLY BOTHERED YOU ARE BY YOUR SKIN PROBLEM (FOR EXAMPLE, WORRY, EMBARRASSMENT, FRUSTRATION): 0 - NEVER BOTHERED
WHAT SINGLE SKIN CONDITION LISTED BELOW IS THE PATIENT ANSWERING THE QUALITY-OF-LIFE ASSESSMENT QUESTIONS ABOUT: NONE OF THE ABOVE
RATE HOW BOTHERED YOU ARE BY SYMPTOMS OF YOUR SKIN PROBLEM (EG, ITCHING, STINGING BURNING, HURTING OR SKIN IRRITATION): 1
RATE HOW BOTHERED YOU ARE BY EFFECTS OF YOUR SKIN PROBLEMS ON YOUR ACTIVITIES (EG, GOING OUT, ACCOMPLISHING WHAT YOU WANT, WORK ACTIVITIES OR YOUR RELATIONSHIPS WITH OTHERS): 0 - NEVER BOTHERED

## 2025-03-12 ENCOUNTER — PATIENT MESSAGE (OUTPATIENT)
Dept: PRIMARY CARE | Facility: CLINIC | Age: 65
End: 2025-03-12
Payer: COMMERCIAL

## 2025-03-12 DIAGNOSIS — F32.1 CURRENT MODERATE EPISODE OF MAJOR DEPRESSIVE DISORDER WITHOUT PRIOR EPISODE (MULTI): ICD-10-CM

## 2025-03-12 RX ORDER — VENLAFAXINE HYDROCHLORIDE 37.5 MG/1
CAPSULE, EXTENDED RELEASE ORAL
Qty: 11 CAPSULE | Refills: 0 | Status: SHIPPED | OUTPATIENT
Start: 2025-03-12 | End: 2025-03-27

## 2025-03-14 ENCOUNTER — APPOINTMENT (OUTPATIENT)
Dept: DERMATOLOGY | Facility: CLINIC | Age: 65
End: 2025-03-14
Payer: COMMERCIAL

## 2025-03-14 DIAGNOSIS — L82.1 SEBORRHEIC KERATOSIS: ICD-10-CM

## 2025-03-14 DIAGNOSIS — L57.9 SKIN CHANGES DUE TO CHRONIC EXPOSURE TO NONIONIZING RADIATION: ICD-10-CM

## 2025-03-14 DIAGNOSIS — D18.01 ANGIOMA OF SKIN: ICD-10-CM

## 2025-03-14 DIAGNOSIS — D22.9 BENIGN NEVUS: Primary | ICD-10-CM

## 2025-03-14 DIAGNOSIS — L81.4 LENTIGO: ICD-10-CM

## 2025-03-14 PROCEDURE — 1036F TOBACCO NON-USER: CPT | Performed by: NURSE PRACTITIONER

## 2025-03-14 PROCEDURE — 99213 OFFICE O/P EST LOW 20 MIN: CPT | Performed by: NURSE PRACTITIONER

## 2025-03-14 NOTE — PROGRESS NOTES
Alma Rosa Lakhani is a 64 y.o. female who presents for the following: Skin Check.     Review of Systems:  No other skin or systemic complaints other than what is documented elsewhere in the note.    The following portions of the chart were reviewed this encounter and updated as appropriate:   Tobacco  Allergies  Meds  Problems  Med Hx  Surg Hx         Skin Cancer History  No skin cancer on file.      Specialty Problems    None       Objective   Well appearing patient in no apparent distress; mood and affect are within normal limits.    A full examination was performed including scalp, head, eyes, ears, nose, lips, neck, chest, axillae, abdomen, back, buttocks, bilateral upper extremities, bilateral lower extremities, hands, feet, fingers, toes, fingernails, and toenails. All findings within normal limits unless otherwise noted below.      Assessment/Plan   1. Benign nevus  Scattered, uniform and benign-appearing, regular brown melanocytic papules and macules.    1. Right mid lower back has a 2.5 x 3.5 mm dark brown macule. See photo.   2. Left lower back has a 4 x 4 irregular shaped tan brown macule with some central globules.   3. Left posterior upper arm has a 3.5 x 2 cm papule that reddish tan brown.   4. Left innar arm has a brown grey tan 3 x 2.5 mm macule with grey globules  symmetrically. No dermoscopic, quantitative or qualitative changes noted.    The present appearance of the lesion is not worrisome but it should continue to be observed and testing/treatment may be warranted if change occurs.    Nevi being monitored are stable. Compared to baseline photos available, no new or changing nevi except some nevi noted to be lighter in color.     Related Procedures  Follow Up In Dermatology - Established Patient    2. Angioma of skin  Scattered cherry-red papule(s).    A cherry hemangioma is a small macule (small, flat, smooth area) or papule (small, solid bump) formed from an overgrowth of tiny  blood vessels in the skin. Cherry hemangiomas are characteristically red or purplish in color. They often first appear in middle adulthood and usually increase in number with age. Cherry hemangiomas are noncancerous (benign) and are common in adults.    The present appearance of the lesion is not worrisome but it should continue to be observed and testing/treatment may be warranted if change occurs.    Related Procedures  Follow Up In Dermatology - Established Patient    3. Seborrheic keratosis  Stuck on verrucous, tan-brown papules and plaques.      Seborrheic keratoses are common noncancerous (benign) growths of unknown cause seen in adults due to a thickening of an area of the top skin layer. Seborrheic keratoses may appear as if they are stuck on to the skin. They have distinct borders, and they may appear as papules (small, solid bumps) or plaques (solid, raised patches that are bigger than a thumbnail). They may be the same color as your skin, or they may be pink, light brown, darker brown, or very dark brown, or sometimes may appear black.    There is no way to prevent new seborrheic keratoses from forming. Seborrheic keratoses can be removed, but removal is considered a cosmetic issue and is usually not covered by insurance.    PLAN  No treatment is needed unless there is irritation from clothing, such as itching or bleeding.  2.   Some lotions containing alpha hydroxy acids, salicylic acid, or urea may make the areas feel smoother with regular use but will not eliminate them.    Related Procedures  Follow Up In Dermatology - Established Patient    4. Lentigo  Scattered tan macules in sun-exposed areas.    A solar lentigo (plural, solar lentigines), also known as a sun-induced freckle or senile lentigo, is a dark (hyperpigmented) lesion caused by natural or artificial ultraviolet (UV) light. Solar lentigines may be single or multiple. This type of lentigo is different from a simple lentigo (lentigo simplex)  because it is caused by exposure to UV light. Solar lentigines are benign, but they do indicate excessive sun exposure, a risk factor for the development of skin cancer.    To prevent solar lentigines, avoid exposure to sunlight in midday (10 AM to 3 PM), wear sun-protective clothing (tightly woven clothes and hats), and apply sunscreen (SPF 30 UVA and UVB block).    The present appearance of the lesion is not worrisome but it should continue to be observed and testing/treatment may be warranted if change occurs.    Related Procedures  Follow Up In Dermatology - Established Patient    5. Skin changes due to chronic exposure to nonionizing radiation  Actinic changes in the form of freckles, lentigines and hyper/hypopigmentation     ABCDEs of melanoma and atypical moles were discussed with the patient.    Patient was instructed to perform monthly self skin examination.  We recommended that the patient have regular full skin exams given an increased risk of subsequent skin cancers.    The patient was instructed to use sun protective behaviors including use of broad spectrum sunscreens and sun protective clothing to reduce risk of skin cancers.    Warning signs of non-melanoma skin cancer discussed.    Related Procedures  Follow Up In Dermatology - Established Patient        Return to clinic in 1 year for skin check/follow up or sooner if needed

## 2025-03-14 NOTE — PATIENT INSTRUCTIONS

## 2025-03-18 ENCOUNTER — TELEPHONE (OUTPATIENT)
Dept: HEMATOLOGY/ONCOLOGY | Facility: CLINIC | Age: 65
End: 2025-03-18
Payer: COMMERCIAL

## 2025-03-18 NOTE — TELEPHONE ENCOUNTER
Called and spoke to Maribel, she said she received a link for to order a [facility] kit but she does not have a smart phone. I informed her I would contact my reps at [employer] and have her set up for mobile phlebotomy draws every 3 months. No barriers to education noted, patient agreed to plan and verbalized understanding using the teach back method.

## 2025-03-26 NOTE — CASE COMMUNICATION
Patient agreeable to skilled nursing services, for homecare.  But declines PT.      To the office, for continuity of care, and seeing as we are only going to see this patient a few times, please keep me as the .    Sridevi ABURTO can see for blood work next week. Any day is good.   [Medication Risks Reviewed] : Medication risks reviewed [Surgical risks reviewed] : Surgical risks reviewed [de-identified] : x-ray b/l knees 3/17/25 reveal degenerative changes b/l knees, joint space narrowing  Discussed risks of surgical treatment and nonsurgical treatment of arthritis, discussed risks of steroid injection plus or minus visco supplementation, risks of zilretta and benefits, role of surgery and MRI, risks and role of NSAIDs and side effects, benefits of therapy. History of relief with visco injections in the past.  reviewed bombaras notes and xrays The risks, benefits and contents of the injection have been discussed. Risks include but are not limited to allergic reaction, flare reaction, permanent white skin discoloration at the injection site and infection.  The patient understands the risks and agrees to having the injection.  All questions have been answered.  Discussed risks of surgical treatment and nonsurgical treatment of arthritis, discussed risks of steroid injection plus or minus viscosupplementation, risks of zilretta and benefits, role of surgery and MRI, risks and role of NSAIDs and side effects, benefits of therapy. discussed total knee replacement and high morbidity at her age she didnt start mobic , encouraged her to do so, we discussed knee replacement since she saw phyllis montoya Discussed the timing of the injections and the follow up that is needed. Advised the patient to ice the area that was injected and that it may take a few days before experiencing relief.  Dsicussed starting a series of Euflexxa injections and pt would like to proceed with Euflexxa #2 b/l knees today.

## 2025-04-03 ENCOUNTER — APPOINTMENT (OUTPATIENT)
Dept: RADIOLOGY | Facility: HOSPITAL | Age: 65
End: 2025-04-03
Payer: COMMERCIAL

## 2025-04-11 ENCOUNTER — LAB (OUTPATIENT)
Dept: LAB | Facility: HOSPITAL | Age: 65
End: 2025-04-11
Payer: COMMERCIAL

## 2025-04-11 DIAGNOSIS — C18.2 MALIGNANT NEOPLASM OF ASCENDING COLON (MULTI): ICD-10-CM

## 2025-04-11 LAB
ALBUMIN SERPL BCP-MCNC: 4.6 G/DL (ref 3.4–5)
ALP SERPL-CCNC: 47 U/L (ref 33–136)
ALT SERPL W P-5'-P-CCNC: 40 U/L (ref 7–45)
ANION GAP SERPL CALC-SCNC: 15 MMOL/L (ref 10–20)
AST SERPL W P-5'-P-CCNC: 34 U/L (ref 9–39)
BASOPHILS # BLD AUTO: 0.04 X10*3/UL (ref 0–0.1)
BASOPHILS NFR BLD AUTO: 0.6 %
BILIRUB SERPL-MCNC: 0.5 MG/DL (ref 0–1.2)
BUN SERPL-MCNC: 17 MG/DL (ref 6–23)
CALCIUM SERPL-MCNC: 9.8 MG/DL (ref 8.6–10.3)
CHLORIDE SERPL-SCNC: 104 MMOL/L (ref 98–107)
CO2 SERPL-SCNC: 24 MMOL/L (ref 21–32)
CREAT SERPL-MCNC: 0.69 MG/DL (ref 0.5–1.05)
EGFRCR SERPLBLD CKD-EPI 2021: >90 ML/MIN/1.73M*2
EOSINOPHIL # BLD AUTO: 0.04 X10*3/UL (ref 0–0.7)
EOSINOPHIL NFR BLD AUTO: 0.6 %
ERYTHROCYTE [DISTWIDTH] IN BLOOD BY AUTOMATED COUNT: 13.3 % (ref 11.5–14.5)
GLUCOSE SERPL-MCNC: 113 MG/DL (ref 74–99)
HCT VFR BLD AUTO: 44.2 % (ref 36–46)
HGB BLD-MCNC: 15.3 G/DL (ref 12–16)
IMM GRANULOCYTES # BLD AUTO: 0.02 X10*3/UL (ref 0–0.7)
IMM GRANULOCYTES NFR BLD AUTO: 0.3 % (ref 0–0.9)
LYMPHOCYTES # BLD AUTO: 1.57 X10*3/UL (ref 1.2–4.8)
LYMPHOCYTES NFR BLD AUTO: 25 %
MCH RBC QN AUTO: 33.3 PG (ref 26–34)
MCHC RBC AUTO-ENTMCNC: 34.6 G/DL (ref 32–36)
MCV RBC AUTO: 96 FL (ref 80–100)
MONOCYTES # BLD AUTO: 0.43 X10*3/UL (ref 0.1–1)
MONOCYTES NFR BLD AUTO: 6.9 %
NEUTROPHILS # BLD AUTO: 4.17 X10*3/UL (ref 1.2–7.7)
NEUTROPHILS NFR BLD AUTO: 66.6 %
NRBC BLD-RTO: 0 /100 WBCS (ref 0–0)
PLATELET # BLD AUTO: 218 X10*3/UL (ref 150–450)
POTASSIUM SERPL-SCNC: 3.9 MMOL/L (ref 3.5–5.3)
PROT SERPL-MCNC: 7.6 G/DL (ref 6.4–8.2)
RBC # BLD AUTO: 4.59 X10*6/UL (ref 4–5.2)
SODIUM SERPL-SCNC: 139 MMOL/L (ref 136–145)
WBC # BLD AUTO: 6.3 X10*3/UL (ref 4.4–11.3)

## 2025-04-11 PROCEDURE — 80053 COMPREHEN METABOLIC PANEL: CPT

## 2025-04-11 PROCEDURE — 85025 COMPLETE CBC W/AUTO DIFF WBC: CPT

## 2025-04-11 PROCEDURE — 82378 CARCINOEMBRYONIC ANTIGEN: CPT

## 2025-04-12 LAB — CEA SERPL-MCNC: 2.2 UG/L

## 2025-04-14 ENCOUNTER — HOSPITAL ENCOUNTER (OUTPATIENT)
Dept: RADIOLOGY | Facility: HOSPITAL | Age: 65
Discharge: HOME | End: 2025-04-14
Payer: COMMERCIAL

## 2025-04-14 DIAGNOSIS — C18.2 MALIGNANT NEOPLASM OF ASCENDING COLON (MULTI): ICD-10-CM

## 2025-04-14 PROCEDURE — 74177 CT ABD & PELVIS W/CONTRAST: CPT | Performed by: RADIOLOGY

## 2025-04-14 PROCEDURE — 74177 CT ABD & PELVIS W/CONTRAST: CPT

## 2025-04-14 PROCEDURE — 71260 CT THORAX DX C+: CPT | Performed by: RADIOLOGY

## 2025-04-14 PROCEDURE — 2550000001 HC RX 255 CONTRASTS: Performed by: STUDENT IN AN ORGANIZED HEALTH CARE EDUCATION/TRAINING PROGRAM

## 2025-04-14 RX ADMIN — IOHEXOL 75 ML: 350 INJECTION, SOLUTION INTRAVENOUS at 09:53

## 2025-04-16 ENCOUNTER — OFFICE VISIT (OUTPATIENT)
Dept: HEMATOLOGY/ONCOLOGY | Facility: CLINIC | Age: 65
End: 2025-04-16
Payer: COMMERCIAL

## 2025-04-16 VITALS
WEIGHT: 159.39 LBS | OXYGEN SATURATION: 97 % | HEART RATE: 71 BPM | RESPIRATION RATE: 16 BRPM | SYSTOLIC BLOOD PRESSURE: 118 MMHG | TEMPERATURE: 97.5 F | BODY MASS INDEX: 24.96 KG/M2 | DIASTOLIC BLOOD PRESSURE: 81 MMHG

## 2025-04-16 DIAGNOSIS — C18.2 MALIGNANT NEOPLASM OF ASCENDING COLON (MULTI): ICD-10-CM

## 2025-04-16 PROCEDURE — 99417 PROLNG OP E/M EACH 15 MIN: CPT

## 2025-04-16 PROCEDURE — 99215 OFFICE O/P EST HI 40 MIN: CPT

## 2025-04-16 ASSESSMENT — PAIN SCALES - GENERAL: PAINLEVEL_OUTOF10: 0-NO PAIN

## 2025-04-17 DIAGNOSIS — C18.4 MALIGNANT NEOPLASM OF TRANSVERSE COLON (MULTI): ICD-10-CM

## 2025-04-17 DIAGNOSIS — Z12.11 SCREEN FOR COLON CANCER: ICD-10-CM

## 2025-04-17 PROCEDURE — RXMED WILLOW AMBULATORY MEDICATION CHARGE

## 2025-04-17 RX ORDER — SODIUM PICOSULFATE, MAGNESIUM OXIDE, AND ANHYDROUS CITRIC ACID 12; 3.5; 1 G/175ML; G/175ML; MG/175ML
2 LIQUID ORAL AS NEEDED
Qty: 350 ML | Refills: 0 | Status: SHIPPED | OUTPATIENT
Start: 2025-04-17

## 2025-04-18 ENCOUNTER — PHARMACY VISIT (OUTPATIENT)
Dept: PHARMACY | Facility: CLINIC | Age: 65
End: 2025-04-18
Payer: COMMERCIAL

## 2025-04-18 DIAGNOSIS — C18.2 MALIGNANT NEOPLASM OF ASCENDING COLON (MULTI): Primary | ICD-10-CM

## 2025-04-18 DIAGNOSIS — Z08 ENCOUNTER FOR FOLLOW-UP SURVEILLANCE OF COLON CANCER: ICD-10-CM

## 2025-04-18 DIAGNOSIS — Z85.038 ENCOUNTER FOR FOLLOW-UP SURVEILLANCE OF COLON CANCER: ICD-10-CM

## 2025-04-18 NOTE — PROGRESS NOTES
Spoke with Maribel. She will follow for colonoscopy in 9/2025 with Dr Harding.  She reports a Signatera March 24, 2025.  We will look for results to upload.  She is in agreement to meet with Dr. Piña following CT scan to establish Oncology services.

## 2025-04-24 DIAGNOSIS — M25.50 ARTHRALGIA, UNSPECIFIED JOINT: ICD-10-CM

## 2025-04-24 DIAGNOSIS — F32.1 CURRENT MODERATE EPISODE OF MAJOR DEPRESSIVE DISORDER WITHOUT PRIOR EPISODE (MULTI): ICD-10-CM

## 2025-04-24 DIAGNOSIS — D15.1 MYXOMA OF HEART: ICD-10-CM

## 2025-04-24 DIAGNOSIS — E78.00 HIGH CHOLESTEROL: ICD-10-CM

## 2025-04-24 RX ORDER — DICLOFENAC SODIUM 75 MG/1
75 TABLET, DELAYED RELEASE ORAL 2 TIMES DAILY
Qty: 180 TABLET | Refills: 3 | Status: SHIPPED | OUTPATIENT
Start: 2025-04-24 | End: 2026-04-24

## 2025-04-24 RX ORDER — TRAZODONE HYDROCHLORIDE 100 MG/1
100 TABLET ORAL NIGHTLY
Qty: 90 TABLET | Refills: 3 | Status: SHIPPED | OUTPATIENT
Start: 2025-04-24 | End: 2026-04-24

## 2025-04-24 RX ORDER — ROSUVASTATIN CALCIUM 10 MG/1
10 TABLET, COATED ORAL DAILY
Qty: 90 TABLET | Refills: 3 | Status: SHIPPED | OUTPATIENT
Start: 2025-04-24 | End: 2026-04-24

## 2025-04-24 RX ORDER — METOPROLOL TARTRATE 25 MG/1
25 TABLET, FILM COATED ORAL DAILY
Qty: 90 TABLET | Refills: 3 | Status: SHIPPED | OUTPATIENT
Start: 2025-04-24 | End: 2026-04-24

## 2025-04-24 RX ORDER — EZETIMIBE 10 MG/1
10 TABLET ORAL DAILY
Qty: 90 TABLET | Refills: 3 | Status: SHIPPED | OUTPATIENT
Start: 2025-04-24 | End: 2026-04-24

## 2025-05-01 NOTE — PROGRESS NOTES
[facility]    PATIENT VISIT INFORMATION    Visit Type: Follow-up visit New Provider    Referring Provider: OBINNA Gant  Reason for referral: Colon cancer surveillance  Primary Practice Provider: Albert Berry MD    CANCER/HEMATOLGOY HISTORY    Pleasant 64-year-old  female presents for colon cancer surveillance.  Previously patient of Dr. Bullock and seen most recently in clinic by OBINNA Gant.     Cancer History:  DIAGNOSIS: Transverse Colon cancer     STAGING: pT3 pN0 M0     CURRENT SITES OF DISEASE:      MOLECULAR/GENOMIC:  MMR-proficient     ctDNA signatera:  10/7/24: negative (0)  1/6/25: negative (0)     TUMOR MARKER:   6/2024: CEA 3.4  10/7/24: CEA 2.3  1/23/25: CEA 2.2     CURRENT TREATMENT:   Surveillance and Signatera q3 months     Last available Signatera negative on 10/7/2024.    PRIOR TREATMENT:   9/11/24: Laparoscopic Extended R colectomy with ileocolic anastomosis     HISTORY:   6/13/24: C-scope showed transverse colon polyp, rectal polyp x2 and transverse colon mass. Path of mass showed invasive moderately differentiated adenocarcinoma, MMR protein expression intact  7/12/24: CT showed no metastatic disease, but with L atrial mass.   8/2/24: S/p excision of L atrial myxoma. Pathology confirmed cardiac myxoma  9/11/24: S/p  Laparoscopic Extended R colectomy with ileocolic anastomosis. Intraoperatively found to have a 1 to 2 cm lesion on the serosa of the very proximal jejunum immediately at the ligament of Treitz.  This was soft and not an obvious malignancy but a possible calcification.  Given the location biopsy was not attempted at this time. Final pathology showed pT3 pN0 Mx, margins negative, no perforation, LV, PNI. There were several tumor buds noted (score: High).       CT C/A/P 7/12/24:  A large filling defect seen in the left atrium which may represent  myxoma versus thrombus. Echocardiogram advised.      No  "features of metastatic disease to the chest. Reported history of  colonic mass. Stool obscures evaluation of the colon. Slight hepatic  steatosis. Senescent changes detected.     Surgical pathology 9/11/24:  A.  Segment of terminal ileum, appendix and colon, resection:  Invasive colonic adenocarcinoma, moderately differentiated.  Twenty-one lymph nodes, negative for malignancy (0/21).  See synoptic report and note.     NOTE: Please see previous report [id number] for mismatch repair protein immunohistochemistry results           Tumor Site   Transverse colon    Histologic Type   Adenocarcinoma    Histologic Grade   G2, moderately differentiated    Tumor Size   Greatest dimension (Centimeters): 2.3 cm    Tumor Extent   Invades through muscularis propria into the pericolonic or perirectal tissue    Macroscopic Tumor Perforation   Not identified    Lymphovascular Invasion   Not identified    Perineural Invasion   Not identified    Number of Tumor Buds   12 per 'hotspot' field    Tumor Bud Score   High (10 or more)    Type of Polyp in which Invasive Carcinoma Arose   The adjcent mucosa shows features of a sessile serrated polyp.    Treatment Effect   No known presurgical therapy    MARGINS    Margin Status for Invasive Carcinoma   All margins negative for invasive carcinoma   Closest Margin(s) to Invasive Carcinoma   Distal   Distance from Invasive Carcinoma to Closest Margin   6.0 cm   Distance from Invasive Carcinoma to Distal Margin   Not applicable   Margin Status for Non-Invasive Tumor   All margins negative for high-grade dysplasia / intramucosal carcinoma and low-grade dysplasia         HISTORY OF PRESENT ILLNESS     ID Statement: Maribel Lakhani is a 64-year-old female    Chief Complaint: \"Physically I feel okay, not so much emotionally\"    Interval History:   Patient and  present for follow-up surveillance with new provider.  They were unsure why they were at [facility] versus seeing Waleska Best in [place].  " "Patient tearful, and explaining that she has seen many providers.    \"A lot of migraines the last 3 weeks.\" Last 2023 and got better.  Migraine medications caused hallucinations. Recent loss in her family and a lot of stress. Taking Tylenol and ice pack when she has headaches. Does eventually goes away.     Appetite good. No weight loss. Weight gain.   Some hot flashes. Worse with certain medications. On Trazodone.   Fatigued and tiered specifically noted with stress.     Denies F/C/recent illness/infection, drenching night sweats, unintentional weight loss, anorexia/loss of appetite, HA, dizziness, lightheadedness, acute changes in vision/hearing, palpitations, CP, SOB, n/v/d/c/abd pain, bleeding, melena, urinary issues, haematuria, LAD, peripheral neuropathy, bone pain, bruising, sores, or rashes.      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     OCCUPATIONAL/ENVIRONMENTAL HISTORY/EXPOSURES:  -None reported    Active Problems, Allergy List, Medication List, and PMH/PSH/FH/Social Hx have been reviewed and reconciled in chart. Updates made when necessary.     REVIEW OF SYSTEMS   A review of systems has been completed and are negative for complaints except what is stated in the assessment, HPI, IH, ROS, and/or past medical history.    ALLERGIES AND MEDICATIONS     Allergies and " "Intolerances:   RX Allergies[3]   Medication Profile:   Current Outpatient Medications   Medication Instructions    acetaminophen (TYLENOL) 650 mg, oral, Every 6 hours PRN    diclofenac (VOLTAREN) 75 mg, oral, 2 times daily PRN, Do not crush, chew, or split.    ezetimibe (ZETIA) 10 mg, oral, Daily    metoprolol tartrate (LOPRESSOR) 12.5 mg, oral, 2 times daily    mv-mn/folic ac/calcium/vit K1 (WOMEN'S 50 PLUS MULTIVITAMIN ORAL) 1 tablet, Daily    rosuvastatin (CRESTOR) 10 mg, oral, Daily    traZODone (DESYREL) 100 mg, oral, Nightly      Available Vaccination Record:   Immunization History   Administered Date(s) Administered    COVID-19, mRNA, LNP-S, PF, 30 mcg/0.3 mL dose 03/14/2021, 04/04/2021    Flu vaccine (IIV4), preservative free *Check age/dose* 11/10/2023    Pfizer Purple Cap SARS-CoV-2 01/04/2022    Pneumococcal Conjugate PCV 7 1960    Tdap vaccine, age 7 year and older (BOOSTRIX, ADACEL) 02/19/2019      PHYSICAL EXAM     Vital Signs/Measurements:       10/3/2024     1:25 PM 10/9/2024    11:25 AM 1/23/2025     9:53 AM 2/11/2025     9:57 AM 2/21/2025    10:25 AM 2/26/2025    10:50 AM 4/16/2025     9:18 AM   Vitals   Systolic 112 122 123 129 124  118   Diastolic 78 79 84 86 80  81   BP Location Left arm Left arm Right arm    Left arm   Heart Rate 80 79 71 65 74  71   Temp 36.6 °C (97.8 °F) 36.6 °C (97.9 °F) 36.4 °C (97.5 °F)  36.7 °C (98.1 °F)  36.4 °C (97.5 °F)   Resp 17 16 17    16   Height 1.728 m (5' 8.03\")     1.705 m (5' 7.13\") 1.702 m (5' 7\")    Weight (lb) 139.11 141.09 156.31 157.9 159.4 159 159.39   BMI 21.13 kg/m2 21.43 kg/m2 23.74 kg/m2 23.99 kg/m2 24.87 kg/m2 24.9 kg/m2 24.96 kg/m2   BSA (m2) 1.74 m2 1.75 m2 1.84 m2 1.85 m2 1.85 m2 1.85 m2 1.85 m2   Visit Report Report Report Report Report Report  Report       Significant value      Performance:   ECOG Performance Status: 0     Grade ECOG performance status   0 Fully active, able to carry on all pre-disease performance without restriction "   1 Restricted in physically strenuous activity but ambulatory and able to carry out work of a light or sedentary nature, e.g., light housework, office work   2 Ambulatory and capable of all selfcare but unable to carry out any work activities; Up and about more than 50% of waking hours   3 Capable of only limited selfcare, confined to bed or chair more than 50% of waking hours   4 Completely disabled; Cannot carry out any selfcare; Totally confined to bed or chair   5 Dead     Physical Exam:  General: Patient is awake/alert/oriented x3, no distress, nourished, hydrated, and cooperative, ambulating without difficulty  Skin: Expected color for ethnicity, good turgor, dry, no prominent lesions, rashes, unusual bruising, or bleeding   Hair: Normal texture and distribution   Nails: Normal color, no deformities    HEENT:   Head: Normocephalic, atraumatic, no visible masses  Eyes: Conjunctiva clear, sclera non-icteric, no exudates or hemorrhages   Ears: nl appearance, hearing intact    Nose: no external lesions, mucosa non-inflamed, no rhinorrhea   Mouth: Mucous membranes moist, no lesions, sores, bleeding, or erythema     Head/Neck: Neck supple, no apparent injury, thyroid without mass or tenderness, No JVD, trachea midline, no bruits appreciated    Respiratory/Thorax: Patent airways, CTAB, chest symmetry, normal inspiratory and expiratory effort    Cardiovascular: Regular rate and rhythm, no murmur or gallop, no carotid bruit or thrills    Gastrointestinal: Bowel sounds normal, non-distended, soft, no tenderness, no masses or hernia, or organomegaly appreciated    Genitourinary: Deferred   Musculoskeletal: Normal gait, normal range of motion, no pain on palpation of spine, no deformity, normal strength, no atrophy, and no CVA tenderness appreciated   Extremities: No amputations or deformities, cyanosis, edema or viscosities, peripheral pulses intact   Neurological: Sensation present to touch, intact senses, motor  response and reflexes normal, normal strength   Breast:    Lymphatic: No significant lymphadenopathy   Psychological: Intact recent and remote memory, judgement, and insight. Appropriate mood, affect, and behavior, patient is tearful when discussing her cancer journey.          RESULTS/DATA     Labs:     Lab Results   Component Value Date    WBC 6.3 04/11/2025    NEUTROABS 4.17 04/11/2025    IGABSOL 0.02 04/11/2025    LYMPHSABS 1.57 04/11/2025    MONOSABS 0.43 04/11/2025    EOSABS 0.04 04/11/2025    BASOSABS 0.04 04/11/2025    RBC 4.59 04/11/2025    MCV 96 04/11/2025    MCHC 34.6 04/11/2025    HGB 15.3 04/11/2025    HCT 44.2 04/11/2025     04/11/2025       Lab Results   Component Value Date    CREATININE 0.69 04/11/2025    BUN 17 04/11/2025    EGFR >90 04/11/2025     04/11/2025    K 3.9 04/11/2025     04/11/2025    CO2 24 04/11/2025      Lab Results   Component Value Date    ALT 40 04/11/2025    AST 34 04/11/2025    ALKPHOS 47 04/11/2025    BILITOT 0.5 04/11/2025      Lab Results   Component Value Date    TSH 1.01 02/18/2025     Lab Results   Component Value Date    TSH 1.01 02/18/2025       Lab Results   Component Value Date    MXWQKHTG89 407 08/12/2020        Lab Results   Component Value Date    KALEB Positive (A) 02/20/2024    SEDRATE 25 02/20/2024      Lab Results   Component Value Date    CRP 2.55 (H) 07/31/2024        Radiology/Studies:   CT chest abdomen pelvis w IV contrast  4/14/2025  IMPRESSION:  Colon cancer restaging scan compared to CT chest abdomen pelvis dated  07/12/2024 and CT enterography dated 10/02/2024. Postsurgical changes  of right hemicolectomy with interval resolution of prior mural  thickening at the surgical bed. New 1.3 cm nodular low-density focus  along the resection margin abutting the right lower quadrant  abdominal wall musculature is indeterminate - may represent scarring,  though early locoregional recurrence cannot be excluded. Recommend  short-term 3-month  "follow-up CT abdomen/pelvis. No evidence of new  metastatic disease in the chest, abdomen, or pelvis.      1. New hepatomegaly and worsening hepatic steatosis, correlate with  LFTs.  2. Stable 7 mm low-density lesion within the pancreatic body,  incompletely characterized however may represent a side branch IPMN  or dilated side branch.  3. Additional stable chronic and incidental findings as described  above.      I personally reviewed the images/study and I agree with the findings  as stated by Resident Theo Osei.      ASSESSMENT/PLAN     Assessment and Plan:   #1. Stage IIA colon cancer   Maribel Lakhani is a 64 y.o. female with MMR-proficient transverse colon cancer, pT3 pN0, Stage II with no high risk features dx 6/2024 and s/p resection on 9/11/24.     Previous patient of Dr. Bullock and Waleska Best, CNP.    Stage IIA colon cancer, reviewed NCCN and provided education and patient resources.     CEA stable at 2.2 consistent with previous.  CBC and CMP unremarkable.    Per Waleska Best's note from January 23, 2025.  \"I discussed with pt that this is a Stage IIA colon cancer. Most of the risk factors for recurrence are absent on her pathology specimen, but there were tumor buds noted. I discussed with her that tumor budding may increase the risk of her cancer coming back, but the data supporting it as a major risk factor for recurrence are limited. I discussed that based on this, we could consider adjuvant chemotherapy, but it is unclear how much benefit she will receive from chemotherapy in this setting. Alternatively, we could adopt a close surveillacne plan. She is in agreement to proceed with surveillance.\"     Plan:   Stage IIA transverse colon cancer  - Plan for labs including CEA and Signatera q3mo and CT q6mo x2 years followed by labs q4-6mo and CT annually years 3-5.  - Will need C-scope 9/2025.  - Repeat labs including Signatera in 3 months prior to her follow up   - CT c/a/p in 3 months at [place]  - " RTC 3mo to review CT scan with a med onc at [place] since Dr. Bullock is no longer at .    Surveillance visit 4/16/2025 patient presents for follow-up thinking this was establishment with new oncologist.  She is in surveillance per previous note from Waleska Best.  We will repeat CT chest abdomen pelvis with IV contrast in 3 months.  She will follow with Dr. Piña to establish with oncologist following scan.  I will reach out to surgical oncology regarding possible IPMN and Dr. Harding's office for colonoscopy scheduling due September 2025.    CT chest abdomen and pelvis from April 14, 2025 shows new 1.3 cm nodular low-density focus along the resection margin abutting the right lower quadrant abdominal wall musculature is indeterminate and may represent scarring versus reoccurrence.  There is no evidence of metastatic disease.  New hepatomegaly and worsening hepatic steatosis, stable 7 mm low-density lesion within the pancreatic body, may represent an IPMN, additional stable chronic and incidental findings.    **Please follow with specialties as scheduled for other health needs and routine screenings.**    Follow up:    RTC:  - 3 months following CT with Dr. Piña    Medications:  - N/A    Imaging/Testing:  - CT chest abdomen and pelvis with IV contrast  - Colonoscopy due September 2025    Referral(s):  - Gastro  -possible surgical oncology consult    Other Pertinent Appointments:  - PCP 8/1/2025  - Cardiology 8/12/2025  - Dermatology 3/16/2026      Patient Discussion Summary:  Discussed plan of care in detail. Patient states understanding and in agreement to the plan. Answered all questions to there liking. The patient will call with any additional questions and/or concerns.    Thank you for allowing me to participate in your care. It was a pleasure meeting you.    Sincerely,  Cass Logan, APRN-CNP     Hematology/Oncology Clinical Nurse Practitioner     [facility]   [id number]  [place], [place] [address]  Office #: [phone]    DISCLAIMER:   In preparing for this visit and writing this note, I reviewed all the previous electronic medical records (testing, labs, imaging, and other procedures, provider notes, and medical charts) of the patient available in the physician portal pertinent to patient care. Significant findings which helped in decision making are recorded in this chart.    This document may have been written by voice recognition software.  There may be some incorrect wording, spelling and/or spelling errors or punctuation errors that were not corrected prior to committing the note to the medical record. Please request clarification if there is documentation error or clarification is needed.   Time based billing: Please see documentation within this specific encounter.         [1]   Past Medical History:  Diagnosis Date    Anxiety 2021    Anxiety and depression     Colon cancer (Multi) 6-13-24    H/O transesophageal echocardiography (FABIOLA) for monitoring 08/02/2024    CONCLUSIONS:  1. The left ventricular systolic function is normal, with a visually estimated ejection fraction of 65%.  2. Left ventricular diastolic filling was indeterminate.  3. Left ventricular cavity size is decreased.  4. There is normal right ventricular global systolic function.  5. Aortic valve stenosis is not present.  6. Mild aortic valve regurgitation.    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 8/2/24    Skin tag    [2]   Past Surgical History:  Procedure Laterality Date    ATRIAL MYXOMA EXCISION  08/02/2024    Myxoma of heart s/p minimally invasive myxoma excision    CARDIAC CATHETERIZATION N/A 07/26/2024    Procedure: Left Heart Cath;  Surgeon: Dariel Dumont MD;   Location: [facility];  Service: Cardiovascular;  Laterality: N/A;    DILATION AND CURETTAGE OF UTERUS      EYE SURGERY  1968    FOOT SURGERY Left     MOUTH SURGERY  1984    tooth extraction    TONSILLECTOMY  1968   [3]   Allergies  Allergen Reactions    Adhesive Tape-Silicones Itching    Ampicillin Rash    Cefdinir Rash    [id number] Antimigraine Agents Anxiety      92

## 2025-07-01 DIAGNOSIS — E78.00 HIGH CHOLESTEROL: ICD-10-CM

## 2025-07-16 ENCOUNTER — HOSPITAL ENCOUNTER (OUTPATIENT)
Dept: RADIOLOGY | Facility: HOSPITAL | Age: 65
End: 2025-07-16

## 2025-07-16 DIAGNOSIS — C18.2 MALIGNANT NEOPLASM OF ASCENDING COLON (MULTI): ICD-10-CM

## 2025-07-22 ENCOUNTER — APPOINTMENT (OUTPATIENT)
Dept: HEMATOLOGY/ONCOLOGY | Facility: CLINIC | Age: 65
End: 2025-07-22

## 2025-07-23 ENCOUNTER — APPOINTMENT (OUTPATIENT)
Dept: HEMATOLOGY/ONCOLOGY | Facility: CLINIC | Age: 65
End: 2025-07-23
Payer: COMMERCIAL

## 2025-07-30 LAB
ALBUMIN SERPL-MCNC: 4.5 G/DL (ref 3.6–5.1)
ALP SERPL-CCNC: 52 U/L (ref 37–153)
ALT SERPL-CCNC: 42 U/L (ref 6–29)
ANION GAP SERPL CALCULATED.4IONS-SCNC: 8 MMOL/L (CALC) (ref 7–17)
AST SERPL-CCNC: 31 U/L (ref 10–35)
BILIRUB SERPL-MCNC: 0.6 MG/DL (ref 0.2–1.2)
BUN SERPL-MCNC: 14 MG/DL (ref 7–25)
CALCIUM SERPL-MCNC: 9.2 MG/DL (ref 8.6–10.4)
CHLORIDE SERPL-SCNC: 107 MMOL/L (ref 98–110)
CHOLEST SERPL-MCNC: 169 MG/DL
CHOLEST/HDLC SERPL: 2.4 (CALC)
CO2 SERPL-SCNC: 24 MMOL/L (ref 20–32)
CREAT SERPL-MCNC: 0.65 MG/DL (ref 0.5–1.05)
EGFRCR SERPLBLD CKD-EPI 2021: 98 ML/MIN/1.73M2
ERYTHROCYTE [DISTWIDTH] IN BLOOD BY AUTOMATED COUNT: 13.6 % (ref 11–15)
GLUCOSE SERPL-MCNC: 97 MG/DL (ref 65–99)
HCT VFR BLD AUTO: 45.2 % (ref 35–45)
HDLC SERPL-MCNC: 69 MG/DL
HGB BLD-MCNC: 14.7 G/DL (ref 11.7–15.5)
LDLC SERPL CALC-MCNC: 81 MG/DL (CALC)
MCH RBC QN AUTO: 33.2 PG (ref 27–33)
MCHC RBC AUTO-ENTMCNC: 32.5 G/DL (ref 32–36)
MCV RBC AUTO: 102 FL (ref 80–100)
NONHDLC SERPL-MCNC: 100 MG/DL (CALC)
PLATELET # BLD AUTO: 200 THOUSAND/UL (ref 140–400)
PMV BLD REES-ECKER: 10.2 FL (ref 7.5–12.5)
POTASSIUM SERPL-SCNC: 4.3 MMOL/L (ref 3.5–5.3)
PROT SERPL-MCNC: 6.7 G/DL (ref 6.1–8.1)
RBC # BLD AUTO: 4.43 MILLION/UL (ref 3.8–5.1)
SODIUM SERPL-SCNC: 139 MMOL/L (ref 135–146)
TRIGL SERPL-MCNC: 93 MG/DL
WBC # BLD AUTO: 6.8 THOUSAND/UL (ref 3.8–10.8)

## 2025-08-01 ENCOUNTER — APPOINTMENT (OUTPATIENT)
Dept: PRIMARY CARE | Facility: CLINIC | Age: 65
End: 2025-08-01
Payer: COMMERCIAL

## 2025-08-01 VITALS
TEMPERATURE: 97.8 F | HEIGHT: 67 IN | WEIGHT: 164 LBS | HEART RATE: 81 BPM | DIASTOLIC BLOOD PRESSURE: 86 MMHG | BODY MASS INDEX: 25.74 KG/M2 | SYSTOLIC BLOOD PRESSURE: 128 MMHG | OXYGEN SATURATION: 99 %

## 2025-08-01 DIAGNOSIS — F51.01 PRIMARY INSOMNIA: ICD-10-CM

## 2025-08-01 DIAGNOSIS — E78.00 HIGH CHOLESTEROL: ICD-10-CM

## 2025-08-01 DIAGNOSIS — R26.81 GAIT INSTABILITY: ICD-10-CM

## 2025-08-01 DIAGNOSIS — C18.4 MALIGNANT NEOPLASM OF TRANSVERSE COLON (MULTI): ICD-10-CM

## 2025-08-01 DIAGNOSIS — F32.1 CURRENT MODERATE EPISODE OF MAJOR DEPRESSIVE DISORDER WITHOUT PRIOR EPISODE (MULTI): ICD-10-CM

## 2025-08-01 DIAGNOSIS — D15.1 CARDIAC MYXOMA: Primary | ICD-10-CM

## 2025-08-01 DIAGNOSIS — Z78.0 MENOPAUSE: ICD-10-CM

## 2025-08-01 DIAGNOSIS — M25.561 ACUTE PAIN OF RIGHT KNEE: ICD-10-CM

## 2025-08-01 PROCEDURE — 99214 OFFICE O/P EST MOD 30 MIN: CPT | Performed by: FAMILY MEDICINE

## 2025-08-01 PROCEDURE — 1159F MED LIST DOCD IN RCRD: CPT | Performed by: FAMILY MEDICINE

## 2025-08-01 PROCEDURE — 1170F FXNL STATUS ASSESSED: CPT | Performed by: FAMILY MEDICINE

## 2025-08-01 PROCEDURE — G0402 INITIAL PREVENTIVE EXAM: HCPCS | Performed by: FAMILY MEDICINE

## 2025-08-01 PROCEDURE — 3008F BODY MASS INDEX DOCD: CPT | Performed by: FAMILY MEDICINE

## 2025-08-01 PROCEDURE — 1160F RVW MEDS BY RX/DR IN RCRD: CPT | Performed by: FAMILY MEDICINE

## 2025-08-01 PROCEDURE — G0446 INTENS BEHAVE THER CARDIO DX: HCPCS | Performed by: FAMILY MEDICINE

## 2025-08-01 RX ORDER — ROSUVASTATIN CALCIUM 10 MG/1
10 TABLET, COATED ORAL DAILY
Qty: 90 TABLET | Refills: 3 | Status: SHIPPED | OUTPATIENT
Start: 2025-08-01 | End: 2026-08-01

## 2025-08-01 RX ORDER — TRAZODONE HYDROCHLORIDE 100 MG/1
200 TABLET ORAL NIGHTLY
Qty: 180 TABLET | Refills: 3 | Status: SHIPPED | OUTPATIENT
Start: 2025-08-01 | End: 2026-08-01

## 2025-08-01 ASSESSMENT — ACTIVITIES OF DAILY LIVING (ADL)
TAKING_MEDICATION: INDEPENDENT
DRESSING: INDEPENDENT
MANAGING_FINANCES: INDEPENDENT
BATHING: INDEPENDENT
GROCERY_SHOPPING: INDEPENDENT
DOING_HOUSEWORK: INDEPENDENT

## 2025-08-01 NOTE — PROGRESS NOTES
"Subjective   Reason for Visit: Maribel Lakhani is an 65 y.o. female here for a welcome to Medicare Wellness visit.  And follow up on chronic stable HLD and recetn myxoma excision one year ago and colon cancer folloow up colonoscopy scheduled for this fall with Dr Harding.    Fell once tripped by her cat.  Feels unsteady on her feet since surgery on her heart -- numbness and weakness on right leg and right flank from incision in right groin and right chest.      Right knee soreness independent for the numbness.     Past Medical, Surgical, and Family History reviewed and updated in chart.    Reviewed all medications by prescribing practitioner or clinical pharmacist (such as prescriptions, OTCs, herbal therapies and supplements) and documented in the medical record.        Patient Care Team:  Albert Berry MD as PCP - General         Objective   Vitals:  /86   Pulse 81   Temp 36.6 °C (97.8 °F)   Ht 1.702 m (5' 7\")   Wt 74.4 kg (164 lb)   LMP  (LMP Unknown)   SpO2 99%   BMI 25.69 kg/m²       O: VSS AFEB Awake, Alert, NAD.  No intoxication, withdrawal, or sedation.  Chest CTA.  Heart RRR.  Ext no c/c/e.      Assessment/Plan   Problem List Items Addressed This Visit       High cholesterol    Overview   7/26/24 LHC <30% plaque.  2/2025 ASCVD risk 6.7%         Current Assessment & Plan   Lipids initially MUCH improved on zetia -- but up HDL 74 and  2/2025.    ASCVD risk, 6.7%    Much improved on crestor and zetia.   OK to stop the zetia -- Crestor should be enough.          Relevant Medications    rosuvastatin (Crestor) 10 mg tablet    Insomnia    Overview   Failed melatonin and ambien 2018.               Current Assessment & Plan   Sleeping well on trazodone.  But with early morning awakenings -- at 4 am.     Did not tolerate effexor due to increased cholesterol.          Current moderate episode of major depressive disorder without prior episode (Multi)    Overview   OFF Effexor (had increased " cholesterol)         Current Assessment & Plan   Stable on trazodone -- still with some early morning awakenings.          Relevant Medications    traZODone (Desyrel) 100 mg tablet    Malignant neoplasm of transverse colon (Multi)    Overview   6/13/24 COLONOSCOPY ([facility]) Invasive adenoCA in transverse colon.   CEA low 3.4    9/11/24 s/p R colectomy, ileocolic anastomosis ([facility])         Current Assessment & Plan   Stable, continue follow up with colorectal [facility] for surveillance colonoscopy.          Gait instability    Overview   Fall and gait instability due to right knee pain and right leg numbness following heart surgery 2024.         Current Assessment & Plan   Order PT eval and treat.           Relevant Orders    Referral to Physical Therapy    Cardiac myxoma - Primary    Overview   8/2/24 S/p excision of heart myxoma. (Clint Morales)  2/11/25 ECHO -- reading pending.   2/11/25 CARDIO ([facility]) continue current meds, repeat echo in 6 months.         Current Assessment & Plan   Continue lopressor 12.5 bid per Dr Morales.          Other Visit Diagnoses         Menopause        Relevant Orders    XR DEXA bone density      Acute pain of right knee        Relevant Orders    XR knee right 4+ views

## 2025-08-01 NOTE — ASSESSMENT & PLAN NOTE
Lipids initially MUCH improved on zetia -- but up HDL 74 and  2/2025.    ASCVD risk, 6.7%    Much improved on crestor and zetia.   OK to stop the zetia -- Crestor should be enough.

## 2025-08-01 NOTE — ASSESSMENT & PLAN NOTE
Sleeping well on trazodone.  But with early morning awakenings -- at 4 am.     Did not tolerate effexor due to increased cholesterol.

## 2025-08-01 NOTE — ASSESSMENT & PLAN NOTE
8/1/25 Welcome to Medicare Preventative Exam -- last Dexa 2022 -- NO osteoporosis. annual mammo -- normal 10/2022. Last pap 8/29/2023. No further needed.   Annual mammogram -utd 2/2025  Bienniel dexa ordered (last 2022)  Vision exam performed 8/1/25.   CRC: hx colon cancer -- follow up colonoscopy with DR Harding.    Tdap 1/2019. Check labs.   Offer Prevnar -- declines today.     Quit smoking 2024.   Only smoked 1/2 ppd off and on -- never consistent enough to amount to 20 py.     8/1/25 I spent 15 minutes face-to-face with this individual discussing their cardiovascular risk and behavioral therapies of nutritional choices, exercise, and elimination of habits contributing to risk.  We agreed on a plan addressing reduction of their current cardiovascular risk.  Patient's 10 year CV risk estimate calculates to:   6.7% ()      # Hx Left wrist pain with ulnar neuropathy -- positive tinel's at ulnar aspect of wrist. Encouraged continued wrist splint -- and working to avoid over use. XRay 2/2021 showed no arthritis or fractures -- most likely neuropathy and tendinitis -- can refer to Dr Smalls starts to lose function in hands. Hot wax treatment is worth trying.      # hx of lumbar spondylosis and recent loss of height -- normal DEXA 2020, 2022.

## 2025-08-05 DIAGNOSIS — F32.1 CURRENT MODERATE EPISODE OF MAJOR DEPRESSIVE DISORDER WITHOUT PRIOR EPISODE (MULTI): ICD-10-CM

## 2025-08-05 RX ORDER — TRAZODONE HYDROCHLORIDE 100 MG/1
200 TABLET ORAL NIGHTLY
Qty: 180 TABLET | Refills: 3 | Status: SHIPPED | OUTPATIENT
Start: 2025-08-05 | End: 2026-08-05

## 2025-08-06 ENCOUNTER — HOSPITAL ENCOUNTER (OUTPATIENT)
Dept: RADIOLOGY | Facility: HOSPITAL | Age: 65
Discharge: HOME | End: 2025-08-06
Payer: MEDICARE

## 2025-08-06 ENCOUNTER — APPOINTMENT (OUTPATIENT)
Dept: RADIOLOGY | Facility: HOSPITAL | Age: 65
End: 2025-08-06
Payer: MEDICARE

## 2025-08-06 DIAGNOSIS — M25.561 ACUTE PAIN OF RIGHT KNEE: ICD-10-CM

## 2025-08-06 DIAGNOSIS — Z78.0 MENOPAUSE: ICD-10-CM

## 2025-08-06 PROCEDURE — 77080 DXA BONE DENSITY AXIAL: CPT | Performed by: RADIOLOGY

## 2025-08-06 PROCEDURE — 77080 DXA BONE DENSITY AXIAL: CPT

## 2025-08-06 PROCEDURE — 73564 X-RAY EXAM KNEE 4 OR MORE: CPT | Mod: RIGHT SIDE | Performed by: RADIOLOGY

## 2025-08-06 PROCEDURE — 73564 X-RAY EXAM KNEE 4 OR MORE: CPT | Mod: RT

## 2025-08-12 ENCOUNTER — APPOINTMENT (OUTPATIENT)
Dept: CARDIOLOGY | Facility: CLINIC | Age: 65
End: 2025-08-12

## 2025-08-25 LAB
ALBUMIN SERPL BCP-MCNC: 4.6 G/DL (ref 3.4–5)
ALP SERPL-CCNC: 55 U/L (ref 33–136)
ALT SERPL W P-5'-P-CCNC: 39 U/L (ref 7–45)
ANION GAP SERPL CALC-SCNC: 13 MMOL/L (ref 10–20)
AST SERPL W P-5'-P-CCNC: 34 U/L (ref 9–39)
BILIRUB SERPL-MCNC: 0.5 MG/DL (ref 0–1.2)
BUN SERPL-MCNC: 20 MG/DL (ref 6–23)
CALCIUM SERPL-MCNC: 10 MG/DL (ref 8.6–10.3)
CEA SERPL-MCNC: 2.3 UG/L
CHLORIDE SERPL-SCNC: 100 MMOL/L (ref 98–107)
CO2 SERPL-SCNC: 27 MMOL/L (ref 21–32)
CREAT SERPL-MCNC: 0.8 MG/DL (ref 0.5–1.05)
EGFRCR SERPLBLD CKD-EPI 2021: 82 ML/MIN/1.73M*2
GLUCOSE SERPL-MCNC: 105 MG/DL (ref 74–99)
POTASSIUM SERPL-SCNC: 4.4 MMOL/L (ref 3.5–5.3)
PROT SERPL-MCNC: 7.6 G/DL (ref 6.4–8.2)
SODIUM SERPL-SCNC: 136 MMOL/L (ref 136–145)

## 2025-08-25 PROCEDURE — 82378 CARCINOEMBRYONIC ANTIGEN: CPT

## 2025-08-25 PROCEDURE — 80053 COMPREHEN METABOLIC PANEL: CPT

## 2025-08-26 ENCOUNTER — OFFICE VISIT (OUTPATIENT)
Dept: CARDIOLOGY | Facility: CLINIC | Age: 65
End: 2025-08-26
Payer: MEDICARE

## 2025-08-26 VITALS
WEIGHT: 164 LBS | OXYGEN SATURATION: 99 % | DIASTOLIC BLOOD PRESSURE: 84 MMHG | HEIGHT: 67 IN | BODY MASS INDEX: 25.74 KG/M2 | HEART RATE: 79 BPM | SYSTOLIC BLOOD PRESSURE: 118 MMHG

## 2025-08-26 DIAGNOSIS — I10 ESSENTIAL (PRIMARY) HYPERTENSION: Primary | ICD-10-CM

## 2025-08-26 DIAGNOSIS — D15.1 MYXOMA OF HEART: ICD-10-CM

## 2025-08-26 PROCEDURE — 99212 OFFICE O/P EST SF 10 MIN: CPT

## 2025-08-26 PROCEDURE — G2211 COMPLEX E/M VISIT ADD ON: HCPCS | Performed by: NURSE PRACTITIONER

## 2025-08-26 PROCEDURE — 99214 OFFICE O/P EST MOD 30 MIN: CPT | Performed by: NURSE PRACTITIONER

## 2025-08-26 PROCEDURE — 3074F SYST BP LT 130 MM HG: CPT | Performed by: NURSE PRACTITIONER

## 2025-08-26 PROCEDURE — 1159F MED LIST DOCD IN RCRD: CPT | Performed by: NURSE PRACTITIONER

## 2025-08-26 PROCEDURE — 3079F DIAST BP 80-89 MM HG: CPT | Performed by: NURSE PRACTITIONER

## 2025-08-26 PROCEDURE — 3008F BODY MASS INDEX DOCD: CPT | Performed by: NURSE PRACTITIONER

## 2025-08-26 RX ORDER — METOPROLOL SUCCINATE 25 MG/1
25 TABLET, EXTENDED RELEASE ORAL DAILY
Qty: 90 TABLET | Refills: 3 | Status: SHIPPED | OUTPATIENT
Start: 2025-08-26 | End: 2026-08-26

## 2025-08-26 RX ORDER — EZETIMIBE 10 MG/1
TABLET ORAL
COMMUNITY
Start: 2025-08-01

## 2025-08-26 ASSESSMENT — ENCOUNTER SYMPTOMS
HEMATOLOGIC/LYMPHATIC NEGATIVE: 1
RESPIRATORY NEGATIVE: 1
ENDOCRINE NEGATIVE: 1
DEPRESSION: 0
NEUROLOGICAL NEGATIVE: 1
EYES NEGATIVE: 1
CONSTITUTIONAL NEGATIVE: 1
PSYCHIATRIC NEGATIVE: 1
CARDIOVASCULAR NEGATIVE: 1
GASTROINTESTINAL NEGATIVE: 1
MYALGIAS: 1

## 2025-08-27 ENCOUNTER — HOSPITAL ENCOUNTER (OUTPATIENT)
Dept: RADIOLOGY | Facility: HOSPITAL | Age: 65
Discharge: HOME | End: 2025-08-27
Payer: MEDICARE

## 2025-08-27 DIAGNOSIS — C18.2 MALIGNANT NEOPLASM OF ASCENDING COLON (MULTI): ICD-10-CM

## 2025-08-27 PROCEDURE — 2550000001 HC RX 255 CONTRASTS

## 2025-08-27 PROCEDURE — 74177 CT ABD & PELVIS W/CONTRAST: CPT

## 2025-08-27 PROCEDURE — 74177 CT ABD & PELVIS W/CONTRAST: CPT | Performed by: RADIOLOGY

## 2025-08-27 PROCEDURE — 71260 CT THORAX DX C+: CPT | Performed by: RADIOLOGY

## 2025-08-27 RX ADMIN — IOHEXOL 75 ML: 350 INJECTION, SOLUTION INTRAVENOUS at 08:39

## 2025-08-29 ENCOUNTER — OFFICE VISIT (OUTPATIENT)
Dept: HEMATOLOGY/ONCOLOGY | Facility: CLINIC | Age: 65
End: 2025-08-29
Payer: MEDICARE

## 2025-08-29 VITALS
TEMPERATURE: 98.4 F | WEIGHT: 165.12 LBS | RESPIRATION RATE: 16 BRPM | BODY MASS INDEX: 25.86 KG/M2 | HEART RATE: 71 BPM | DIASTOLIC BLOOD PRESSURE: 75 MMHG | SYSTOLIC BLOOD PRESSURE: 109 MMHG | OXYGEN SATURATION: 97 %

## 2025-08-29 DIAGNOSIS — Z85.038 ENCOUNTER FOR FOLLOW-UP SURVEILLANCE OF COLON CANCER: ICD-10-CM

## 2025-08-29 DIAGNOSIS — C18.2 MALIGNANT NEOPLASM OF ASCENDING COLON (MULTI): ICD-10-CM

## 2025-08-29 DIAGNOSIS — Z08 ENCOUNTER FOR FOLLOW-UP SURVEILLANCE OF COLON CANCER: ICD-10-CM

## 2025-08-29 PROCEDURE — 99214 OFFICE O/P EST MOD 30 MIN: CPT | Performed by: INTERNAL MEDICINE

## 2025-08-29 PROCEDURE — 1159F MED LIST DOCD IN RCRD: CPT | Performed by: INTERNAL MEDICINE

## 2025-08-29 PROCEDURE — 1126F AMNT PAIN NOTED NONE PRSNT: CPT | Performed by: INTERNAL MEDICINE

## 2025-08-29 PROCEDURE — 1036F TOBACCO NON-USER: CPT | Performed by: INTERNAL MEDICINE

## 2025-08-29 ASSESSMENT — ENCOUNTER SYMPTOMS
RESPIRATORY NEGATIVE: 1
CONSTITUTIONAL NEGATIVE: 1
GASTROINTESTINAL NEGATIVE: 1
CARDIOVASCULAR NEGATIVE: 1
EYES NEGATIVE: 1

## 2025-08-29 ASSESSMENT — PAIN SCALES - GENERAL: PAINLEVEL_OUTOF10: 0-NO PAIN

## 2025-09-07 DIAGNOSIS — C18.2 MALIGNANT NEOPLASM OF ASCENDING COLON (MULTI): Primary | ICD-10-CM

## 2025-09-07 DIAGNOSIS — Z08 ENCOUNTER FOR FOLLOW-UP SURVEILLANCE OF COLON CANCER: ICD-10-CM

## 2025-09-07 DIAGNOSIS — Z85.038 ENCOUNTER FOR FOLLOW-UP SURVEILLANCE OF COLON CANCER: ICD-10-CM

## 2026-02-03 ENCOUNTER — APPOINTMENT (OUTPATIENT)
Dept: PRIMARY CARE | Facility: CLINIC | Age: 66
End: 2026-02-03
Payer: MEDICARE

## 2026-03-16 ENCOUNTER — APPOINTMENT (OUTPATIENT)
Dept: DERMATOLOGY | Facility: CLINIC | Age: 66
End: 2026-03-16
Payer: COMMERCIAL

## 2026-09-09 ENCOUNTER — APPOINTMENT (OUTPATIENT)
Dept: PRIMARY CARE | Facility: CLINIC | Age: 66
End: 2026-09-09
Payer: MEDICARE

## (undated) DEVICE — TUBING, SUCTION, CONNECTING, STERILE 0.25 X 120 IN., LF

## (undated) DEVICE — SUTURE, VICRYL, 2-0, 27 IN, CT-1, VIOLET

## (undated) DEVICE — SPONGE, HEMOSTAT, SURGICEL FIBRILLAR, ABS, 4 X 4, LF

## (undated) DEVICE — CASSETTE, BLOOD, PLEGIC SET

## (undated) DEVICE — KIT, TOURNIQUET, 7"

## (undated) DEVICE — CONNECTOR, STRAIGHT, 0.375 X 0.375 IN

## (undated) DEVICE — SPONGE, HEMOSTATIC, CELLULOSE, SURGICEL, 2 X 14 IN

## (undated) DEVICE — NERVE BLOCK, STIMUQUIK, SINGLE-SHOT, 21GA X 3-1/2

## (undated) DEVICE — CANNULA, CARDIAC SUMP

## (undated) DEVICE — CAUTERY, PENCIL, PUSH BUTTON, SMOKE EVAC, 70MM

## (undated) DEVICE — TUBING, SUCTION, CONNECTING, NON-CONDUCTIVE, SURE GRIP CONNECTORS, 3/16 X 18 IN, PVC

## (undated) DEVICE — SUTURE, PDS II, 1, 36 IN, CT-1, VIOLET

## (undated) DEVICE — CONNECTOR, Y, 0.375 X 0.375 X 0.5 IN

## (undated) DEVICE — Device

## (undated) DEVICE — CANNULA, MULTIPLE PERFUSION SET, 15"

## (undated) DEVICE — TUBING, INSUFFLATION, LAPAROSCOPIC, FILTER, 10 FT

## (undated) DEVICE — SURGISLEEVE, WOUND PROTECTOR, SMALL 2.5-6CM

## (undated) DEVICE — DRESSING, ADHESIVE, ISLAND, TELFA, 2 X 3.75 IN, LF

## (undated) DEVICE — CUTTER, PROX LINEAR, 75MM, REG TISSUE, W/ SAFETY LOCK OUT

## (undated) DEVICE — CATHETER, OPTITORQUE, 5FR, TIG1, 1H/100CM

## (undated) DEVICE — ELECTRODE, QUICK-COMBO, EDGE SYSTEM, REDI PACK

## (undated) DEVICE — COLLECTION UNIT, DRAINAGE, THORACIC, SINGLE TUBE, DRY SUCTION, ATS COMPATIBLE, OASIS 3600, LF

## (undated) DEVICE — SUTURE, SILK, 2-0, 30 IN, SH, CONTROL RELEASE, MULTIPACK, BLACK

## (undated) DEVICE — CARE KIT, LAPAROSCOPIC, ADVANCED

## (undated) DEVICE — OXYGENATOR FX 25, W/HR, ARTERIAL FILTER

## (undated) DEVICE — PITCHER, GRADUATE, 32 OZ (1200CC), STERILE

## (undated) DEVICE — BLADE, SAW STERNUM, STERILE

## (undated) DEVICE — TUBE SET, PNEUMOLAR HEATED, SMOKE EVACU, HIGH-FLOW

## (undated) DEVICE — DRAPE, SHEET, CARDIOVASCULAR, ANTIMICROBIAL, W/ANESTHESIA SCREEN, IOBAN 2, STERI DRAPE, 107 X 133 IN, DISPOSABLE, FABRIC, BLUE, STERILE

## (undated) DEVICE — DRESSING, MEPILEX, BORDER, SACRUM, 8.7 X 9.8 IN

## (undated) DEVICE — COUNTER, NEEDLE, FOAM BLOCK, POP-N-COUNT, W/BLADEGUARD, W/ADHESIVE 40 COUNT, RED

## (undated) DEVICE — SUTURE, NUROLON, 0, 18 IN, CT1, DETACHABLE, MULTIPACK, BLACK

## (undated) DEVICE — SUTURE, PROLENE 4-0, TAPER POINT, SH-1 BLUE 30 INCH

## (undated) DEVICE — CANNULA, AORTIC ROOT, 12G

## (undated) DEVICE — DRAPE, LEGGINGS, 28.5 X 43 IN, DISPOSABLE, LF, STERILE

## (undated) DEVICE — PACING CABLE, EXTENSION, 12 FT BLUE, DISPOSABLE

## (undated) DEVICE — ANGIO KIT, LEFT HEART, LF, CUSTOM

## (undated) DEVICE — SUTURE, SURGIPRO II 3-0, 54 BLUE, V-20D/A"

## (undated) DEVICE — YANKAUER, RIGID, STRAIGHT, OPEN TIP, NO VENT

## (undated) DEVICE — TROCAR, THOROACOPORT, 10.5MM

## (undated) DEVICE — DRAPE, SHEET, UTILITY, NON ABSORBENT, 18 X 26 IN, LF

## (undated) DEVICE — KIT, CELL SAVER, W/COLLECTION SET, 225ML WASH SET

## (undated) DEVICE — SUTURE, VICRYL, 4-0, 18 IN, PS2, UNDYED

## (undated) DEVICE — TUBING PACK, OXYGENATOR, ADULT

## (undated) DEVICE — SUTURE, MONOCRYL, 4-0, 27 IN, PS-2, UNDYED

## (undated) DEVICE — COVER, CART, 45 X 27 X 48 IN, CLEAR

## (undated) DEVICE — DRAIN, PENROSE, 5/8 IN X 18 IN, STERILE, LF

## (undated) DEVICE — APPLICATOR, CHLORAPREP, W/ORANGE TINT, 26ML

## (undated) DEVICE — CATHETER, DRAINAGE, NASOGASTRIC, DOUBLE LUMEN, FUNNEL END, SUMP, SALEM, 18 FR, 48 IN, PVC, STERILE

## (undated) DEVICE — SUTURE, ETHIBOND, 5, 30 IN, V40, MULTIPACK, GREEN

## (undated) DEVICE — MICROCOAGULATION TEST, ACT+ TEST CUVETTE

## (undated) DEVICE — SURGISLEEVE, WOUND PROTECTOR, MEDIUM 5-9CM

## (undated) DEVICE — MAYO TRAY, SMALL

## (undated) DEVICE — LEAD, PACING, MYOCARDIAL, BIPOLAR, TEMPORARY

## (undated) DEVICE — GUIDEWIRE, INQUIRE, J TIP, .035 X 210CM, FIXED CORE, DIAGNOSTIC

## (undated) DEVICE — SUTURE, SURGICAL STEEL, STERNUM 7, 18 IN, KV40, SINGL-WIRE

## (undated) DEVICE — DRAPE, INSTRUMENT, W/POUCH, STERI DRAPE, 7 X 11 IN, DISPOSABLE, STERILE

## (undated) DEVICE — TROCAR, KII OPTICAL BLADELESS 5MM Z THREAD 100MM LNGTH

## (undated) DEVICE — FILTER, IV, BLOOD, MICROAGGREGATE, 40 MIC, RBC TRANSFUSION

## (undated) DEVICE — SYRINGE, 60 CC, IRRIGATION, BULB, CONTRO-BULB, PAPER POUCH

## (undated) DEVICE — SPONGE, GAUZE, XRAY DECT, 16 PLY, 4 X 4, W/MASTER DMT,STERILE

## (undated) DEVICE — INTRODUCER SHEATH, GLIDESHEATH, 6FR 10CM

## (undated) DEVICE — CLEANER, ELECTROSURGICAL, TIP, 5 X 5 CM, LF

## (undated) DEVICE — TROCAR, OPTICAL BLADELESS 5MM X 100 W/ADVANCED FIXATION

## (undated) DEVICE — SUTURE, PROLENE, 3-0, 36 IN, SH, DA, BLUE

## (undated) DEVICE — SENSOR, OXYGEN, CEREBRAL, SOMASENSOR, ADULT

## (undated) DEVICE — SUTURE, ETHIBOND, XTRA, 30 IN, 0, CT-1, GREEN

## (undated) DEVICE — SHUNT, SENSOR

## (undated) DEVICE — SUTURE, PROLENE, 5-0, 36 IN, RB1, DA, BLUE

## (undated) DEVICE — ELECTRODE, ELECTROSURGICAL, BLADE, INSULATED, ENT/IMA, STERILE

## (undated) DEVICE — CATHETER, THORACIC, STRAIGHT, ADULT, 28 FR, PVC

## (undated) DEVICE — MANIFOLD KIT, CUSTOM, GEAUGA

## (undated) DEVICE — CANNULA, BIOMEDICUS 25FR, FEMORAL VENOUS

## (undated) DEVICE — ADAPTER, CARDIOPLEGIA, VENTING, Y, 19.1 CM

## (undated) DEVICE — PORT, 5MM THORACOPORT SOFT

## (undated) DEVICE — DISSECTOR, BLUNT TIP, 5MM

## (undated) DEVICE — SUTURE, VICRYL PLUS 3-0, SH, 27IN

## (undated) DEVICE — PUMP, STRYKERFLOW 2 & HANDPIECE W/10FT. IRRIGATION TUBING

## (undated) DEVICE — DRESSING, MEPILEX, BORDER, HEEL, 8.7 X 9.1 IN

## (undated) DEVICE — DRAPE, PAD, INSTRUMENT, MAGNETIC, MEDIUM, 10 X 16 IN, DISPOSABLE

## (undated) DEVICE — PERFUSION SERVICES

## (undated) DEVICE — CANNULA, KII ADVANCED FIXATION, 5X100MM W/SEAL

## (undated) DEVICE — LIGASURE, V SEALER/DIVIDER  5MM BLUNT TIP

## (undated) DEVICE — CATHETER, URETHRAL, ROBNEL, 10 FR,16 IN, LF, RED

## (undated) DEVICE — MARKER, SKIN, DUAL TIP INK W/9 LABEL AND REMOVABLE TIME OUT SLEEVE

## (undated) DEVICE — PERFUSION PACK, HEMOCONCENTRATOR, MINNTECH, W/TUBING

## (undated) DEVICE — DRESSING, ISLAND, TELFA, 4 X 5 IN

## (undated) DEVICE — CANNULA, ARTERIAL, BIO-MEDICUS, 17FR, VENTED

## (undated) DEVICE — TUBE SET, PNEUMOCLEAR, SMOKE EVACU, HIGH-FLOW

## (undated) DEVICE — SUTURE, VICRYL 0, TAPER POINT, CT-1 VIOLET 27 INCH

## (undated) DEVICE — TUBING, 0.375 X 3/32 IN X 6 FT

## (undated) DEVICE — SUTURE, SILK, 1, 30 IN, LABYRINTH, BLACK

## (undated) DEVICE — CLOSURE SYSTEM, DERMABOND, PRINEO, 22CM, STERILE

## (undated) DEVICE — GLOVE, SURGICAL, PROTEXIS PI MICRO, 7.5, PF, LF

## (undated) DEVICE — KIT, COR-KNOT MINI COMBO

## (undated) DEVICE — CUTTER, PROXIMATE LINEAR RELOAD, 75MM, BLUE

## (undated) DEVICE — DRESSING, ADHESIVE, ISLAND, TELFA, 4 X 14 IN

## (undated) DEVICE — TOWEL, SURGICAL, NEURO, O/R, 16 X 26, BLUE, STERILE

## (undated) DEVICE — TISSUE ADHESIVE, EXOFIN, 1ML

## (undated) DEVICE — MANIFOLD, 4 PORT NEPTUNE STANDARD

## (undated) DEVICE — DRAPE, FLUID WARMER

## (undated) DEVICE — CATHETER, URETHRAL, ROBNEL,  8 FR, 16 IN, LF, RED

## (undated) DEVICE — NEEDLE, ENTRY, PERCUTANEOUS, 21 G X 2.5 CM

## (undated) DEVICE — WAX, BONE, 2.5 GM

## (undated) DEVICE — TROCAR SYSTEM, BALLOON, KII GELPORT, 12 X 100MM

## (undated) DEVICE — NEEDLE, INSUFFLATION, 13GAX120MM, DISP

## (undated) DEVICE — SUTURE, P6 STERNUM DOUBLE WIRE, CCS P 2 NEEDLE

## (undated) DEVICE — CLIPPER, SURGICAL BLADE ASSEMBLY, GENERAL PURPOSE, SINGLE USE

## (undated) DEVICE — PACING CABLE, EXTENSION, 12 FT BEIGE, DISPOSABLE

## (undated) DEVICE — SUTURE, VICRYL, 3-0,18 IN, SH, UNDYED

## (undated) DEVICE — INSTRUMENT, DISSECTING, ENDOSCOPIC, PEANUT, 5 MM, STERILE

## (undated) DEVICE — HANDPIECE, POOLE SUCTION, W/O TUBING

## (undated) DEVICE — STAPLER, LINEAR, 3.5 60MM, RELOADABLE, BLUE

## (undated) DEVICE — GOWN, SURGICAL, SMARTGOWN, XLARGE, STERILE

## (undated) DEVICE — TAPE, POLYESTER, BRAIDED, PRE-CUT 2 X 30, WHITE"

## (undated) DEVICE — SUTURE, PROLENE, 4-0, 36 IN, RB1, DA, BLUE

## (undated) DEVICE — SPONGE, LAP, XRAY DECT, 18IN X 18IN, W/MASTER DMT, STERILE